# Patient Record
Sex: FEMALE | Race: BLACK OR AFRICAN AMERICAN | NOT HISPANIC OR LATINO | Employment: UNEMPLOYED
[De-identification: names, ages, dates, MRNs, and addresses within clinical notes are randomized per-mention and may not be internally consistent; named-entity substitution may affect disease eponyms.]

---
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
## 2018-09-04 ENCOUNTER — CHARTING TRANS (OUTPATIENT)
Dept: OTHER | Age: 18
End: 2018-09-04

## 2018-11-03 VITALS
WEIGHT: 164.02 LBS | HEART RATE: 65 BPM | TEMPERATURE: 96.8 F | SYSTOLIC BLOOD PRESSURE: 126 MMHG | HEIGHT: 70 IN | RESPIRATION RATE: 18 BRPM | DIASTOLIC BLOOD PRESSURE: 69 MMHG | BODY MASS INDEX: 23.48 KG/M2 | OXYGEN SATURATION: 100 %

## 2018-11-03 VITALS
OXYGEN SATURATION: 98 % | SYSTOLIC BLOOD PRESSURE: 104 MMHG | DIASTOLIC BLOOD PRESSURE: 64 MMHG | TEMPERATURE: 97.9 F | RESPIRATION RATE: 18 BRPM | WEIGHT: 160.93 LBS

## 2018-11-06 VITALS
OXYGEN SATURATION: 97 % | HEIGHT: 70 IN | HEART RATE: 64 BPM | BODY MASS INDEX: 24.03 KG/M2 | DIASTOLIC BLOOD PRESSURE: 80 MMHG | RESPIRATION RATE: 18 BRPM | SYSTOLIC BLOOD PRESSURE: 110 MMHG | WEIGHT: 167.88 LBS

## 2018-11-27 VITALS
SYSTOLIC BLOOD PRESSURE: 130 MMHG | WEIGHT: 167.77 LBS | HEIGHT: 70 IN | HEART RATE: 66 BPM | OXYGEN SATURATION: 100 % | DIASTOLIC BLOOD PRESSURE: 70 MMHG | RESPIRATION RATE: 20 BRPM | BODY MASS INDEX: 24.02 KG/M2

## 2019-01-02 ENCOUNTER — OFFICE VISIT (OUTPATIENT)
Dept: URGENT CARE | Age: 19
End: 2019-01-02

## 2019-01-02 DIAGNOSIS — N89.8 VAGINAL DISCHARGE: Primary | ICD-10-CM

## 2019-01-02 PROCEDURE — 87660 TRICHOMONAS VAGIN DIR PROBE: CPT | Performed by: FAMILY MEDICINE

## 2019-01-02 PROCEDURE — 87480 CANDIDA DNA DIR PROBE: CPT | Performed by: FAMILY MEDICINE

## 2019-01-02 PROCEDURE — 87591 N.GONORRHOEAE DNA AMP PROB: CPT | Performed by: FAMILY MEDICINE

## 2019-01-02 PROCEDURE — 87510 GARDNER VAG DNA DIR PROBE: CPT | Performed by: FAMILY MEDICINE

## 2019-01-02 PROCEDURE — 87491 CHLMYD TRACH DNA AMP PROBE: CPT | Performed by: FAMILY MEDICINE

## 2019-01-02 PROCEDURE — 99214 OFFICE O/P EST MOD 30 MIN: CPT | Performed by: FAMILY MEDICINE

## 2019-01-02 RX ORDER — METRONIDAZOLE 500 MG/1
500 TABLET ORAL 2 TIMES DAILY
Qty: 14 TABLET | Refills: 0 | Status: SHIPPED | OUTPATIENT
Start: 2019-01-02 | End: 2019-01-09

## 2019-01-02 RX ORDER — FLUCONAZOLE 150 MG/1
150 TABLET ORAL ONCE
Qty: 2 TABLET | Refills: 0 | Status: SHIPPED | OUTPATIENT
Start: 2019-01-02 | End: 2019-01-02

## 2019-01-02 SDOH — HEALTH STABILITY: MENTAL HEALTH: HOW OFTEN DO YOU HAVE A DRINK CONTAINING ALCOHOL?: NEVER

## 2019-01-02 ASSESSMENT — PAIN SCALES - GENERAL: PAINLEVEL: 0

## 2019-01-03 LAB
CANDIDA RRNA VAG QL PROBE: NEGATIVE
G VAGINALIS RRNA GENITAL QL PROBE: POSITIVE
T VAGINALIS RRNA GENITAL QL PROBE: NEGATIVE

## 2019-01-04 ENCOUNTER — TELEPHONE (OUTPATIENT)
Dept: URGENT CARE | Age: 19
End: 2019-01-04

## 2019-01-04 LAB
C TRACH RRNA SPEC QL NAA+PROBE: POSITIVE
N GONORRHOEA RRNA SPEC QL NAA+PROBE: NEGATIVE
SPECIMEN SOURCE: ABNORMAL

## 2019-01-04 RX ORDER — AZITHROMYCIN 500 MG/1
1000 TABLET, FILM COATED ORAL DAILY
Qty: 2 TABLET | Refills: 0 | Status: SHIPPED | OUTPATIENT
Start: 2019-01-04 | End: 2019-01-05

## 2019-05-21 ENCOUNTER — HOSPITAL ENCOUNTER (EMERGENCY)
Facility: CLINIC | Age: 19
Discharge: HOME | End: 2019-05-21
Attending: EMERGENCY MEDICINE | Admitting: EMERGENCY MEDICINE

## 2019-05-21 VITALS
BODY MASS INDEX: 22.06 KG/M2 | RESPIRATION RATE: 19 BRPM | SYSTOLIC BLOOD PRESSURE: 124 MMHG | HEART RATE: 56 BPM | WEIGHT: 162.9 LBS | HEIGHT: 72 IN | DIASTOLIC BLOOD PRESSURE: 59 MMHG | TEMPERATURE: 98.7 F | OXYGEN SATURATION: 100 %

## 2019-05-21 DIAGNOSIS — R46.89 BEHAVIOR CHANGE DUE TO SUBSTANCE USE: ICD-10-CM

## 2019-05-21 LAB
AMPHETAMINES UR QL SCN: NEGATIVE
BARBITURATES UR QL: NEGATIVE
BENZODIAZ UR QL: NEGATIVE
CANNABINOIDS UR QL SCN: POSITIVE
COCAINE UR QL: NEGATIVE
ETHANOL UR QL SCN: NEGATIVE
GLUCOSE BLDC GLUCOMTR-MCNC: 119 MG/DL (ref 70–99)
HCG UR QL: NEGATIVE
OPIATES UR QL SCN: NEGATIVE

## 2019-05-21 PROCEDURE — 99283 EMERGENCY DEPT VISIT LOW MDM: CPT | Mod: Z6 | Performed by: EMERGENCY MEDICINE

## 2019-05-21 PROCEDURE — 80307 DRUG TEST PRSMV CHEM ANLYZR: CPT | Performed by: EMERGENCY MEDICINE

## 2019-05-21 PROCEDURE — 81025 URINE PREGNANCY TEST: CPT | Performed by: EMERGENCY MEDICINE

## 2019-05-21 PROCEDURE — 00000146 ZZHCL STATISTIC GLUCOSE BY METER IP

## 2019-05-21 PROCEDURE — 99283 EMERGENCY DEPT VISIT LOW MDM: CPT | Performed by: EMERGENCY MEDICINE

## 2019-05-21 PROCEDURE — 80320 DRUG SCREEN QUANTALCOHOLS: CPT | Performed by: EMERGENCY MEDICINE

## 2019-05-21 ASSESSMENT — MIFFLIN-ST. JEOR: SCORE: 1620.91

## 2019-05-21 NOTE — ED NOTES
Per friends who are here with patient, patient reportedly ingested an edible form of marijuana.  Unsure if she used anything else.  She appears paranoid and psychotic.

## 2019-05-21 NOTE — ED AVS SNAPSHOT
[facility], Emergency Department  [address] [place] [address]  Phone:  [phone]  Fax:  [phone]                                    Bibiana Andino   MRN: [id number]    Department:  [facility], Emergency Department   Date of Visit:  5/21/2019           After Visit Summary Signature Page    I have received my discharge instructions, and my questions have been answered. I have discussed any challenges I see with this plan with the nurse or doctor.    ..........................................................................................................................................  Patient/Patient Representative Signature      ..........................................................................................................................................  Patient Representative Print Name and Relationship to Patient    ..................................................               ................................................  Date                                   Time    ..........................................................................................................................................  Reviewed by Signature/Title    ...................................................              ..............................................  Date                                               Time          22EPIC Rev 08/18

## 2019-05-21 NOTE — DISCHARGE INSTRUCTIONS
Do not drive today.  Avoid drug use.   Follow up with your primary care provider this week.  Return if persistent symptoms.
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
## 2019-05-30 ASSESSMENT — ENCOUNTER SYMPTOMS
DIZZINESS: 1
FEVER: 0
COLOR CHANGE: 0
CHILLS: 0
BACK PAIN: 0
NECK PAIN: 0
MYALGIAS: 0
VOMITING: 0
HALLUCINATIONS: 0
SORE THROAT: 0
FATIGUE: 1
DIFFICULTY URINATING: 0
NERVOUS/ANXIOUS: 1
DYSPHORIC MOOD: 1
TREMORS: 0
AGITATION: 0
CONFUSION: 0
HEMATURIA: 0
SHORTNESS OF BREATH: 0
BRUISES/BLEEDS EASILY: 0
EYE REDNESS: 0
SEIZURES: 0
PALPITATIONS: 0
NAUSEA: 1
ARTHRALGIAS: 0
DECREASED CONCENTRATION: 1
HEADACHES: 0
FLANK PAIN: 0
ABDOMINAL PAIN: 0
NECK STIFFNESS: 0
COUGH: 0
CHEST TIGHTNESS: 0
RHINORRHEA: 0
WEAKNESS: 0
DIARRHEA: 0
DIAPHORESIS: 0

## 2019-06-10 ENCOUNTER — TELEPHONE (OUTPATIENT)
Dept: SCHEDULING | Age: 19
End: 2019-06-10

## 2019-06-13 ENCOUNTER — TELEPHONE (OUTPATIENT)
Dept: SCHEDULING | Age: 19
End: 2019-06-13

## 2019-07-29 ENCOUNTER — TELEPHONE (OUTPATIENT)
Dept: SCHEDULING | Age: 19
End: 2019-07-29

## 2019-07-31 ENCOUNTER — OFFICE VISIT (OUTPATIENT)
Dept: FAMILY MEDICINE | Age: 19
End: 2019-07-31

## 2019-07-31 VITALS
HEART RATE: 56 BPM | DIASTOLIC BLOOD PRESSURE: 52 MMHG | HEIGHT: 71 IN | TEMPERATURE: 98.2 F | SYSTOLIC BLOOD PRESSURE: 108 MMHG | BODY MASS INDEX: 22.69 KG/M2 | WEIGHT: 162.04 LBS | OXYGEN SATURATION: 100 %

## 2019-07-31 DIAGNOSIS — Z20.2 CHLAMYDIA CONTACT, TREATED: ICD-10-CM

## 2019-07-31 DIAGNOSIS — Z01.419 ENCOUNTER FOR ANNUAL ROUTINE GYNECOLOGICAL EXAMINATION: Primary | ICD-10-CM

## 2019-07-31 PROCEDURE — 87491 CHLMYD TRACH DNA AMP PROBE: CPT | Performed by: FAMILY MEDICINE

## 2019-07-31 PROCEDURE — 87591 N.GONORRHOEAE DNA AMP PROB: CPT | Performed by: FAMILY MEDICINE

## 2019-07-31 PROCEDURE — 99395 PREV VISIT EST AGE 18-39: CPT | Performed by: FAMILY MEDICINE

## 2019-07-31 SDOH — HEALTH STABILITY: MENTAL HEALTH: HOW OFTEN DO YOU HAVE A DRINK CONTAINING ALCOHOL?: NEVER

## 2019-07-31 ASSESSMENT — PATIENT HEALTH QUESTIONNAIRE - PHQ9
SUM OF ALL RESPONSES TO PHQ9 QUESTIONS 1 AND 2: 0
1. LITTLE INTEREST OR PLEASURE IN DOING THINGS: NOT AT ALL
SUM OF ALL RESPONSES TO PHQ9 QUESTIONS 1 AND 2: 0
2. FEELING DOWN, DEPRESSED OR HOPELESS: NOT AT ALL

## 2019-08-01 LAB
C TRACH RRNA SPEC QL NAA+PROBE: NEGATIVE
N GONORRHOEA RRNA SPEC QL NAA+PROBE: NEGATIVE
SPECIMEN SOURCE: NORMAL

## 2019-08-05 ASSESSMENT — ENCOUNTER SYMPTOMS
SINUS PAIN: 0
SINUS PRESSURE: 0
HEADACHES: 0
DIARRHEA: 0
BACK PAIN: 0
SORE THROAT: 0
CHILLS: 0
DIZZINESS: 0
SHORTNESS OF BREATH: 0
WHEEZING: 0
FEVER: 0
ABDOMINAL PAIN: 0
NUMBNESS: 0
VOMITING: 0
RHINORRHEA: 0
FATIGUE: 0
NAUSEA: 0
CONSTIPATION: 0
COUGH: 0
LIGHT-HEADEDNESS: 0

## 2019-08-11 LAB — CYTOLOGY CVX/VAG DOC THIN PREP: NORMAL

## 2021-01-01 ENCOUNTER — EXTERNAL RECORD (OUTPATIENT)
Dept: HEALTH INFORMATION MANAGEMENT | Facility: OTHER | Age: 21
End: 2021-01-01

## 2021-04-29 ENCOUNTER — TELEPHONE (OUTPATIENT)
Dept: SCHEDULING | Age: 21
End: 2021-04-29

## 2021-04-30 ENCOUNTER — TELEPHONE (OUTPATIENT)
Dept: SCHEDULING | Age: 21
End: 2021-04-30

## 2021-05-06 ENCOUNTER — TELEPHONE (OUTPATIENT)
Dept: SCHEDULING | Age: 21
End: 2021-05-06

## 2021-05-08 ENCOUNTER — OFFICE VISIT (OUTPATIENT)
Dept: FAMILY MEDICINE | Age: 21
End: 2021-05-08

## 2021-05-08 VITALS
HEIGHT: 71 IN | OXYGEN SATURATION: 97 % | BODY MASS INDEX: 24.23 KG/M2 | SYSTOLIC BLOOD PRESSURE: 111 MMHG | DIASTOLIC BLOOD PRESSURE: 60 MMHG | HEART RATE: 70 BPM | TEMPERATURE: 98.8 F | WEIGHT: 173.06 LBS

## 2021-05-08 DIAGNOSIS — Z00.00 ROUTINE HISTORY AND PHYSICAL EXAMINATION OF ADULT: ICD-10-CM

## 2021-05-08 DIAGNOSIS — F30.9 MANIA (CMD): Primary | ICD-10-CM

## 2021-05-08 DIAGNOSIS — L70.0 ACNE VULGARIS: ICD-10-CM

## 2021-05-08 DIAGNOSIS — Z13.1 SCREENING FOR DIABETES MELLITUS (DM): ICD-10-CM

## 2021-05-08 DIAGNOSIS — Z01.419 VISIT FOR GYNECOLOGIC EXAMINATION: ICD-10-CM

## 2021-05-08 DIAGNOSIS — Z11.3 SCREEN FOR STD (SEXUALLY TRANSMITTED DISEASE): ICD-10-CM

## 2021-05-08 DIAGNOSIS — Z11.59 NEED FOR HEPATITIS C SCREENING TEST: ICD-10-CM

## 2021-05-08 PROBLEM — Z20.2 CHLAMYDIA CONTACT, TREATED: Status: RESOLVED | Noted: 2019-07-31 | Resolved: 2021-05-08

## 2021-05-08 PROCEDURE — 99203 OFFICE O/P NEW LOW 30 MIN: CPT | Performed by: GENERAL PRACTICE

## 2021-05-08 RX ORDER — LITHIUM CARBONATE 300 MG/1
600 TABLET, FILM COATED, EXTENDED RELEASE ORAL 2 TIMES DAILY
COMMUNITY
Start: 2021-05-03 | End: 2021-05-08 | Stop reason: SDUPTHER

## 2021-05-08 RX ORDER — ADAPALENE 0.1 G/100G
CREAM TOPICAL NIGHTLY
Qty: 45 G | Refills: 0 | Status: SHIPPED | OUTPATIENT
Start: 2021-05-08

## 2021-05-08 RX ORDER — LITHIUM CARBONATE 300 MG/1
TABLET, FILM COATED, EXTENDED RELEASE ORAL
Qty: 120 TABLET | Refills: 1 | Status: SHIPPED | OUTPATIENT
Start: 2021-05-08 | End: 2021-06-14 | Stop reason: SDUPTHER

## 2021-05-08 ASSESSMENT — ENCOUNTER SYMPTOMS
POLYDIPSIA: 0
DIZZINESS: 0
EYE DISCHARGE: 0
DIAPHORESIS: 0
EYE PAIN: 0
UNEXPECTED WEIGHT CHANGE: 0
WHEEZING: 0
COLOR CHANGE: 0
NERVOUS/ANXIOUS: 0
FEVER: 0
VOMITING: 0
AGITATION: 0
CONFUSION: 0
HEADACHES: 0
BACK PAIN: 0
SORE THROAT: 0
ROS SKIN COMMENTS: + ACNE
SINUS PAIN: 0
BRUISES/BLEEDS EASILY: 0
POLYPHAGIA: 0
WEAKNESS: 0
TREMORS: 0
ACTIVITY CHANGE: 0
APPETITE CHANGE: 0
VOICE CHANGE: 0
FATIGUE: 0
LIGHT-HEADEDNESS: 0
ABDOMINAL PAIN: 0
RHINORRHEA: 0
CHILLS: 0
NAUSEA: 0
COUGH: 0
SHORTNESS OF BREATH: 0
CONSTIPATION: 0
DIARRHEA: 0
BLOOD IN STOOL: 0

## 2021-05-08 ASSESSMENT — PATIENT HEALTH QUESTIONNAIRE - PHQ9
CLINICAL INTERPRETATION OF PHQ9 SCORE: NO FURTHER SCREENING NEEDED
SUM OF ALL RESPONSES TO PHQ9 QUESTIONS 1 AND 2: 0
1. LITTLE INTEREST OR PLEASURE IN DOING THINGS: NOT AT ALL
CLINICAL INTERPRETATION OF PHQ2 SCORE: NO FURTHER SCREENING NEEDED
SUM OF ALL RESPONSES TO PHQ9 QUESTIONS 1 AND 2: 0
2. FEELING DOWN, DEPRESSED OR HOPELESS: NOT AT ALL

## 2021-05-10 ENCOUNTER — LAB SERVICES (OUTPATIENT)
Dept: LAB | Age: 21
End: 2021-05-10

## 2021-05-10 DIAGNOSIS — Z13.1 SCREENING FOR DIABETES MELLITUS (DM): ICD-10-CM

## 2021-05-10 DIAGNOSIS — Z00.00 ROUTINE HISTORY AND PHYSICAL EXAMINATION OF ADULT: ICD-10-CM

## 2021-05-10 DIAGNOSIS — Z11.3 SCREEN FOR STD (SEXUALLY TRANSMITTED DISEASE): ICD-10-CM

## 2021-05-10 DIAGNOSIS — Z11.59 NEED FOR HEPATITIS C SCREENING TEST: ICD-10-CM

## 2021-05-10 LAB
ALBUMIN SERPL-MCNC: 4.4 G/DL (ref 3.6–5.1)
ALBUMIN/GLOB SERPL: 1.1 {RATIO} (ref 1–2.4)
ALP SERPL-CCNC: 79 UNITS/L (ref 45–117)
ALT SERPL-CCNC: 13 UNITS/L
ANION GAP SERPL CALC-SCNC: 10 MMOL/L (ref 10–20)
ANNOTATION COMMENT IMP: NORMAL
APPEARANCE UR: NORMAL
AST SERPL-CCNC: 11 UNITS/L
BASOPHILS # BLD: 0 K/MCL (ref 0–0.3)
BASOPHILS NFR BLD: 0 %
BILIRUB SERPL-MCNC: 0.6 MG/DL (ref 0.2–1)
BILIRUB UR QL STRIP: NEGATIVE
BUN SERPL-MCNC: 9 MG/DL (ref 6–20)
BUN/CREAT SERPL: 10 (ref 7–25)
CALCIUM SERPL-MCNC: 10.3 MG/DL (ref 8.4–10.2)
CHLORIDE SERPL-SCNC: 105 MMOL/L (ref 98–107)
CHOLEST SERPL-MCNC: 181 MG/DL
CHOLEST/HDLC SERPL: 2.7 {RATIO}
CO2 SERPL-SCNC: 26 MMOL/L (ref 21–32)
COLOR UR: YELLOW
CREAT SERPL-MCNC: 0.88 MG/DL (ref 0.51–0.95)
DEPRECATED RDW RBC: 47.1 FL (ref 39–50)
EOSINOPHIL # BLD: 0.2 K/MCL (ref 0–0.5)
EOSINOPHIL NFR BLD: 3 %
ERYTHROCYTE [DISTWIDTH] IN BLOOD: 14.3 % (ref 11–15)
FASTING DURATION TIME PATIENT: ABNORMAL H
FASTING DURATION TIME PATIENT: NORMAL H
GFR SERPLBLD BASED ON 1.73 SQ M-ARVRAT: >90 ML/MIN/1.73M2
GLOBULIN SER-MCNC: 4.1 G/DL (ref 2–4)
GLUCOSE SERPL-MCNC: 87 MG/DL (ref 65–99)
GLUCOSE UR STRIP-MCNC: NEGATIVE MG/DL
HAV IGM SER QL: NEGATIVE
HBA1C MFR BLD: 5.2 % (ref 4.5–5.6)
HBV CORE IGM SER QL: NEGATIVE
HBV SURFACE AG SER QL: NEGATIVE
HCT VFR BLD CALC: 42.6 % (ref 36–46.5)
HCV AB SER QL: NEGATIVE
HDLC SERPL-MCNC: 67 MG/DL
HGB BLD-MCNC: 13.2 G/DL (ref 12–15.5)
HGB UR QL STRIP: NEGATIVE
HIV 1+2 AB+HIV1 P24 AG SERPL QL IA: NONREACTIVE
IMM GRANULOCYTES # BLD AUTO: 0 K/MCL (ref 0–0.2)
IMM GRANULOCYTES # BLD: 0 %
IMP & REVIEW OF LAB RESULTS: NORMAL
KETONES UR STRIP-MCNC: NEGATIVE MG/DL
LDLC SERPL CALC-MCNC: 100 MG/DL
LEUKOCYTE ESTERASE UR QL STRIP: NEGATIVE
LYMPHOCYTES # BLD: 1.3 K/MCL (ref 1–4.8)
LYMPHOCYTES NFR BLD: 18 %
MCH RBC QN AUTO: 27.7 PG (ref 26–34)
MCHC RBC AUTO-ENTMCNC: 31 G/DL (ref 32–36.5)
MCV RBC AUTO: 89.5 FL (ref 78–100)
MONOCYTES # BLD: 0.7 K/MCL (ref 0.3–0.9)
MONOCYTES NFR BLD: 10 %
NEUTROPHILS # BLD: 4.8 K/MCL (ref 1.8–7.7)
NEUTROPHILS NFR BLD: 69 %
NITRITE UR QL STRIP: NEGATIVE
NONHDLC SERPL-MCNC: 114 MG/DL
NRBC BLD MANUAL-RTO: 0 /100 WBC
PH UR STRIP: 7 [PH] (ref 5–7)
PLATELET # BLD AUTO: 227 K/MCL (ref 140–450)
POTASSIUM SERPL-SCNC: 4.4 MMOL/L (ref 3.4–5.1)
PROT SERPL-MCNC: 8.5 G/DL (ref 6.4–8.2)
PROT UR STRIP-MCNC: NEGATIVE MG/DL
RBC # BLD: 4.76 MIL/MCL (ref 4–5.2)
SODIUM SERPL-SCNC: 137 MMOL/L (ref 135–145)
SP GR UR STRIP: 1.01 (ref 1–1.03)
TRIGL SERPL-MCNC: 68 MG/DL
TSH SERPL-ACNC: 1.59 MCUNITS/ML (ref 0.35–5)
UROBILINOGEN UR STRIP-MCNC: 0.2 MG/DL
WBC # BLD: 7.1 K/MCL (ref 4.2–11)

## 2021-05-10 PROCEDURE — 82306 VITAMIN D 25 HYDROXY: CPT | Performed by: GENERAL PRACTICE

## 2021-05-10 PROCEDURE — 84443 ASSAY THYROID STIM HORMONE: CPT | Performed by: GENERAL PRACTICE

## 2021-05-10 PROCEDURE — 80061 LIPID PANEL: CPT | Performed by: GENERAL PRACTICE

## 2021-05-10 PROCEDURE — 87389 HIV-1 AG W/HIV-1&-2 AB AG IA: CPT | Performed by: GENERAL PRACTICE

## 2021-05-10 PROCEDURE — 86592 SYPHILIS TEST NON-TREP QUAL: CPT | Performed by: GENERAL PRACTICE

## 2021-05-10 PROCEDURE — 36415 COLL VENOUS BLD VENIPUNCTURE: CPT | Performed by: GENERAL PRACTICE

## 2021-05-10 PROCEDURE — 80074 ACUTE HEPATITIS PANEL: CPT | Performed by: GENERAL PRACTICE

## 2021-05-10 PROCEDURE — 83036 HEMOGLOBIN GLYCOSYLATED A1C: CPT | Performed by: GENERAL PRACTICE

## 2021-05-10 PROCEDURE — 80053 COMPREHEN METABOLIC PANEL: CPT | Performed by: GENERAL PRACTICE

## 2021-05-10 PROCEDURE — 85025 COMPLETE CBC W/AUTO DIFF WBC: CPT | Performed by: GENERAL PRACTICE

## 2021-05-10 PROCEDURE — 81003 URINALYSIS AUTO W/O SCOPE: CPT | Performed by: GENERAL PRACTICE

## 2021-05-11 LAB
25(OH)D3+25(OH)D2 SERPL-MCNC: 29.6 NG/ML (ref 30–100)
RPR SER QL: NONREACTIVE

## 2021-05-25 VITALS
RESPIRATION RATE: 18 BRPM | SYSTOLIC BLOOD PRESSURE: 100 MMHG | TEMPERATURE: 98 F | HEART RATE: 83 BPM | DIASTOLIC BLOOD PRESSURE: 62 MMHG | OXYGEN SATURATION: 100 % | WEIGHT: 176.04 LBS

## 2021-06-14 DIAGNOSIS — F30.9 MANIA (CMD): ICD-10-CM

## 2021-06-14 RX ORDER — LITHIUM CARBONATE 300 MG/1
TABLET, FILM COATED, EXTENDED RELEASE ORAL
Qty: 120 TABLET | Refills: 1 | Status: SHIPPED | OUTPATIENT
Start: 2021-06-14

## 2021-06-28 ENCOUNTER — TELEPHONE (OUTPATIENT)
Dept: SCHEDULING | Age: 21
End: 2021-06-28

## 2021-06-29 ENCOUNTER — TELEPHONE (OUTPATIENT)
Dept: SCHEDULING | Age: 21
End: 2021-06-29

## 2021-07-07 ENCOUNTER — HOSPITAL ENCOUNTER (INPATIENT)
Age: 21
End: 2021-07-07
Attending: PSYCHIATRY & NEUROLOGY | Admitting: PSYCHIATRY & NEUROLOGY

## 2021-07-07 ENCOUNTER — TELEPHONE (OUTPATIENT)
Dept: SCHEDULING | Age: 21
End: 2021-07-07

## 2021-07-12 ENCOUNTER — TELEPHONE (OUTPATIENT)
Dept: SCHEDULING | Age: 21
End: 2021-07-12

## 2021-07-19 ENCOUNTER — LAB SERVICES (OUTPATIENT)
Dept: LAB | Age: 21
End: 2021-07-19

## 2021-07-19 ENCOUNTER — OFFICE VISIT (OUTPATIENT)
Dept: FAMILY MEDICINE | Age: 21
End: 2021-07-19

## 2021-07-19 VITALS
BODY MASS INDEX: 23.46 KG/M2 | TEMPERATURE: 98.2 F | OXYGEN SATURATION: 100 % | DIASTOLIC BLOOD PRESSURE: 58 MMHG | SYSTOLIC BLOOD PRESSURE: 111 MMHG | HEIGHT: 71 IN | HEART RATE: 63 BPM | RESPIRATION RATE: 18 BRPM | WEIGHT: 167.55 LBS

## 2021-07-19 DIAGNOSIS — F30.9 MANIA (CMD): ICD-10-CM

## 2021-07-19 DIAGNOSIS — F30.9 MANIA (CMD): Primary | ICD-10-CM

## 2021-07-19 DIAGNOSIS — E83.52 HYPERCALCEMIA: ICD-10-CM

## 2021-07-19 DIAGNOSIS — E55.9 VITAMIN D INSUFFICIENCY: ICD-10-CM

## 2021-07-19 DIAGNOSIS — L25.9 CONTACT DERMATITIS, UNSPECIFIED CONTACT DERMATITIS TYPE, UNSPECIFIED TRIGGER: ICD-10-CM

## 2021-07-19 DIAGNOSIS — F31.11 BIPOLAR AFFECTIVE DISORDER, CURRENTLY MANIC, MILD (CMD): ICD-10-CM

## 2021-07-19 DIAGNOSIS — S90.811A ABRASION OF RIGHT FOOT, INITIAL ENCOUNTER: ICD-10-CM

## 2021-07-19 PROCEDURE — 90471 IMMUNIZATION ADMIN: CPT | Performed by: GENERAL PRACTICE

## 2021-07-19 PROCEDURE — 80053 COMPREHEN METABOLIC PANEL: CPT | Performed by: GENERAL PRACTICE

## 2021-07-19 PROCEDURE — 99215 OFFICE O/P EST HI 40 MIN: CPT | Performed by: GENERAL PRACTICE

## 2021-07-19 PROCEDURE — 90714 TD VACC NO PRESV 7 YRS+ IM: CPT

## 2021-07-19 PROCEDURE — 80178 ASSAY OF LITHIUM: CPT | Performed by: GENERAL PRACTICE

## 2021-07-19 PROCEDURE — 36415 COLL VENOUS BLD VENIPUNCTURE: CPT

## 2021-07-19 RX ORDER — LITHIUM CARBONATE 300 MG/1
300 CAPSULE ORAL DAILY
COMMUNITY

## 2021-07-19 RX ORDER — HYDROXYZINE HYDROCHLORIDE 25 MG/1
25 TABLET, FILM COATED ORAL 3 TIMES DAILY PRN
Qty: 21 TABLET | Refills: 0 | Status: SHIPPED | OUTPATIENT
Start: 2021-07-19 | End: 2021-07-27 | Stop reason: SDUPTHER

## 2021-07-19 RX ORDER — LITHIUM CARBONATE 600 MG/1
600 CAPSULE ORAL DAILY
COMMUNITY

## 2021-07-19 RX ORDER — PREDNISONE 10 MG/1
TABLET ORAL
Qty: 12 TABLET | Refills: 0 | Status: SHIPPED | OUTPATIENT
Start: 2021-07-19

## 2021-07-19 RX ORDER — AMOXICILLIN AND CLAVULANATE POTASSIUM 875; 125 MG/1; MG/1
1 TABLET, FILM COATED ORAL EVERY 12 HOURS
Qty: 20 TABLET | Refills: 0 | Status: SHIPPED | OUTPATIENT
Start: 2021-07-19 | End: 2021-07-29

## 2021-07-19 RX ORDER — GINSENG 100 MG
CAPSULE ORAL 2 TIMES DAILY
Qty: 28 G | Refills: 0 | Status: SHIPPED | OUTPATIENT
Start: 2021-07-19 | End: 2021-07-27 | Stop reason: ALTCHOICE

## 2021-07-19 ASSESSMENT — ENCOUNTER SYMPTOMS
SHORTNESS OF BREATH: 0
WHEEZING: 0
ACTIVITY CHANGE: 0
LIGHT-HEADEDNESS: 0
EYE DISCHARGE: 0
CONFUSION: 0
BLOOD IN STOOL: 0
CHILLS: 0
COUGH: 0
NERVOUS/ANXIOUS: 0
POLYPHAGIA: 0
DIARRHEA: 0
RHINORRHEA: 0
COLOR CHANGE: 0
BRUISES/BLEEDS EASILY: 0
APPETITE CHANGE: 0
CONSTIPATION: 0
POLYDIPSIA: 0
WEAKNESS: 0
HEADACHES: 0
SINUS PAIN: 0
EYE PAIN: 0
AGITATION: 0
DIZZINESS: 0
VOMITING: 0
FEVER: 0
DIAPHORESIS: 0
UNEXPECTED WEIGHT CHANGE: 0
FATIGUE: 0
TREMORS: 0
BACK PAIN: 0
SORE THROAT: 0
ABDOMINAL PAIN: 0
NAUSEA: 0
VOICE CHANGE: 0

## 2021-07-20 LAB
ALBUMIN SERPL-MCNC: 4.3 G/DL (ref 3.6–5.1)
ALBUMIN/GLOB SERPL: 1.2 {RATIO} (ref 1–2.4)
ALP SERPL-CCNC: 75 UNITS/L (ref 45–117)
ALT SERPL-CCNC: 15 UNITS/L
ANION GAP SERPL CALC-SCNC: 9 MMOL/L (ref 10–20)
AST SERPL-CCNC: 14 UNITS/L
BILIRUB SERPL-MCNC: 0.4 MG/DL (ref 0.2–1)
BUN SERPL-MCNC: 10 MG/DL (ref 6–20)
BUN/CREAT SERPL: 13 (ref 7–25)
CALCIUM SERPL-MCNC: 10.1 MG/DL (ref 8.4–10.2)
CHLORIDE SERPL-SCNC: 107 MMOL/L (ref 98–107)
CO2 SERPL-SCNC: 29 MMOL/L (ref 21–32)
CREAT SERPL-MCNC: 0.78 MG/DL (ref 0.51–0.95)
FASTING DURATION TIME PATIENT: ABNORMAL H
GFR SERPLBLD BASED ON 1.73 SQ M-ARVRAT: >90 ML/MIN/1.73M2
GLOBULIN SER-MCNC: 3.6 G/DL (ref 2–4)
GLUCOSE SERPL-MCNC: 84 MG/DL (ref 65–99)
LITHIUM SERPL-SCNC: 0.8 MMOL/L (ref 0.6–1.2)
POTASSIUM SERPL-SCNC: 4.6 MMOL/L (ref 3.4–5.1)
PROT SERPL-MCNC: 7.9 G/DL (ref 6.4–8.2)
SODIUM SERPL-SCNC: 140 MMOL/L (ref 135–145)

## 2021-07-21 ENCOUNTER — TELEPHONE (OUTPATIENT)
Dept: SCHEDULING | Age: 21
End: 2021-07-21

## 2021-07-21 ENCOUNTER — TELEPHONE (OUTPATIENT)
Dept: FAMILY MEDICINE | Age: 21
End: 2021-07-21

## 2021-07-22 ENCOUNTER — TELEPHONE (OUTPATIENT)
Dept: SCHEDULING | Age: 21
End: 2021-07-22

## 2021-07-23 ENCOUNTER — TELEPHONE (OUTPATIENT)
Dept: SCHEDULING | Age: 21
End: 2021-07-23

## 2021-07-23 ENCOUNTER — APPOINTMENT (OUTPATIENT)
Dept: FAMILY MEDICINE | Age: 21
End: 2021-07-23

## 2021-07-26 ENCOUNTER — TELEPHONE (OUTPATIENT)
Dept: SCHEDULING | Age: 21
End: 2021-07-26

## 2021-07-27 ENCOUNTER — APPOINTMENT (OUTPATIENT)
Dept: FAMILY MEDICINE | Age: 21
End: 2021-07-27

## 2021-07-27 ENCOUNTER — OFFICE VISIT (OUTPATIENT)
Dept: FAMILY MEDICINE | Age: 21
End: 2021-07-27

## 2021-07-27 VITALS
HEIGHT: 71 IN | TEMPERATURE: 98.4 F | OXYGEN SATURATION: 100 % | HEART RATE: 72 BPM | BODY MASS INDEX: 23.46 KG/M2 | RESPIRATION RATE: 18 BRPM | DIASTOLIC BLOOD PRESSURE: 74 MMHG | WEIGHT: 167.55 LBS | SYSTOLIC BLOOD PRESSURE: 103 MMHG

## 2021-07-27 DIAGNOSIS — F30.9 MANIA (CMD): ICD-10-CM

## 2021-07-27 DIAGNOSIS — E83.52 HYPERCALCEMIA: ICD-10-CM

## 2021-07-27 DIAGNOSIS — F31.11 BIPOLAR AFFECTIVE DISORDER, CURRENTLY MANIC, MILD (CMD): Primary | ICD-10-CM

## 2021-07-27 DIAGNOSIS — S90.811A ABRASION OF RIGHT FOOT, INITIAL ENCOUNTER: ICD-10-CM

## 2021-07-27 DIAGNOSIS — L25.9 CONTACT DERMATITIS, UNSPECIFIED CONTACT DERMATITIS TYPE, UNSPECIFIED TRIGGER: ICD-10-CM

## 2021-07-27 DIAGNOSIS — L24.9 IRRITANT CONTACT DERMATITIS, UNSPECIFIED TRIGGER: ICD-10-CM

## 2021-07-27 PROCEDURE — 99213 OFFICE O/P EST LOW 20 MIN: CPT | Performed by: GENERAL PRACTICE

## 2021-07-27 RX ORDER — PREDNISONE 20 MG/1
20 TABLET ORAL DAILY
Qty: 5 TABLET | Refills: 0 | Status: SHIPPED | OUTPATIENT
Start: 2021-07-27 | End: 2021-08-01

## 2021-07-27 RX ORDER — HYDROXYZINE HYDROCHLORIDE 25 MG/1
25 TABLET, FILM COATED ORAL 3 TIMES DAILY PRN
Qty: 21 TABLET | Refills: 0 | Status: SHIPPED | OUTPATIENT
Start: 2021-07-27 | End: 2021-08-03

## 2021-07-27 ASSESSMENT — ENCOUNTER SYMPTOMS
CHILLS: 0
BACK PAIN: 0
VOICE CHANGE: 0
SINUS PAIN: 0
RHINORRHEA: 0
COUGH: 0
EYE PAIN: 0
NAUSEA: 0
BRUISES/BLEEDS EASILY: 0
WHEEZING: 0
ACTIVITY CHANGE: 0
ABDOMINAL PAIN: 0
APPETITE CHANGE: 0
WEAKNESS: 0
HEADACHES: 0
LIGHT-HEADEDNESS: 0
POLYPHAGIA: 0
CONSTIPATION: 0
AGITATION: 0
FATIGUE: 0
FEVER: 0
UNEXPECTED WEIGHT CHANGE: 0
POLYDIPSIA: 0
EYE DISCHARGE: 0
NERVOUS/ANXIOUS: 0
COLOR CHANGE: 0
VOMITING: 0
SHORTNESS OF BREATH: 0
DIAPHORESIS: 0
SORE THROAT: 0
BLOOD IN STOOL: 0
TREMORS: 0
CONFUSION: 0
DIARRHEA: 0
DIZZINESS: 0

## 2021-09-02 ENCOUNTER — TELEPHONE (OUTPATIENT)
Dept: INTERNAL MEDICINE | Age: 21
End: 2021-09-02

## 2021-11-08 ENCOUNTER — HOSPITAL ENCOUNTER (EMERGENCY)
Facility: CLINIC | Age: 21
Discharge: PSYCHIATRIC OR SUBSTANCE USE FACILITY | End: 2021-11-09
Attending: EMERGENCY MEDICINE | Admitting: EMERGENCY MEDICINE
Payer: MEDICAID

## 2021-11-08 DIAGNOSIS — F31.2 SEVERE MANIC BIPOLAR 1 DISORDER WITH PSYCHOTIC BEHAVIOR (H): Primary | ICD-10-CM

## 2021-11-08 DIAGNOSIS — F43.10 PTSD (POST-TRAUMATIC STRESS DISORDER): ICD-10-CM

## 2021-11-08 LAB
AMPHETAMINES UR QL SCN: ABNORMAL
BARBITURATES UR QL: ABNORMAL
BENZODIAZ UR QL: ABNORMAL
CANNABINOIDS UR QL SCN: ABNORMAL
COCAINE UR QL: ABNORMAL
HCG UR QL: NEGATIVE
OPIATES UR QL SCN: ABNORMAL
PCP UR QL SCN: ABNORMAL
SARS-COV-2 RNA RESP QL NAA+PROBE: NEGATIVE

## 2021-11-08 PROCEDURE — 81025 URINE PREGNANCY TEST: CPT | Performed by: EMERGENCY MEDICINE

## 2021-11-08 PROCEDURE — 90791 PSYCH DIAGNOSTIC EVALUATION: CPT

## 2021-11-08 PROCEDURE — 99285 EMERGENCY DEPT VISIT HI MDM: CPT | Mod: 25

## 2021-11-08 PROCEDURE — 80307 DRUG TEST PRSMV CHEM ANLYZR: CPT | Performed by: EMERGENCY MEDICINE

## 2021-11-08 PROCEDURE — 87635 SARS-COV-2 COVID-19 AMP PRB: CPT | Performed by: EMERGENCY MEDICINE

## 2021-11-08 PROCEDURE — C9803 HOPD COVID-19 SPEC COLLECT: HCPCS

## 2021-11-08 PROCEDURE — 96372 THER/PROPH/DIAG INJ SC/IM: CPT | Performed by: EMERGENCY MEDICINE

## 2021-11-08 PROCEDURE — 250N000011 HC RX IP 250 OP 636: Performed by: EMERGENCY MEDICINE

## 2021-11-08 RX ORDER — HYDROXYZINE HYDROCHLORIDE 25 MG/1
25 TABLET, FILM COATED ORAL
Status: DISCONTINUED | OUTPATIENT
Start: 2021-11-08 | End: 2021-11-09 | Stop reason: HOSPADM

## 2021-11-08 RX ORDER — WATER 10 ML/10ML
INJECTION INTRAMUSCULAR; INTRAVENOUS; SUBCUTANEOUS
Status: DISCONTINUED
Start: 2021-11-08 | End: 2021-11-09 | Stop reason: HOSPADM

## 2021-11-08 RX ORDER — IBUPROFEN 600 MG/1
600 TABLET, FILM COATED ORAL EVERY 4 HOURS PRN
Status: DISCONTINUED | OUTPATIENT
Start: 2021-11-08 | End: 2021-11-09 | Stop reason: HOSPADM

## 2021-11-08 RX ORDER — OLANZAPINE 10 MG/2ML
10 INJECTION, POWDER, FOR SOLUTION INTRAMUSCULAR DAILY PRN
Status: DISCONTINUED | OUTPATIENT
Start: 2021-11-08 | End: 2021-11-09 | Stop reason: HOSPADM

## 2021-11-08 RX ORDER — LANOLIN ALCOHOL/MO/W.PET/CERES
3 CREAM (GRAM) TOPICAL
Status: DISCONTINUED | OUTPATIENT
Start: 2021-11-08 | End: 2021-11-09 | Stop reason: HOSPADM

## 2021-11-08 RX ORDER — LORAZEPAM 1 MG/1
1 TABLET ORAL EVERY 8 HOURS PRN
Status: DISCONTINUED | OUTPATIENT
Start: 2021-11-08 | End: 2021-11-09 | Stop reason: HOSPADM

## 2021-11-08 RX ORDER — ACETAMINOPHEN 325 MG/1
650 TABLET ORAL EVERY 4 HOURS PRN
Status: DISCONTINUED | OUTPATIENT
Start: 2021-11-08 | End: 2021-11-09 | Stop reason: HOSPADM

## 2021-11-08 RX ORDER — OLANZAPINE 10 MG/2ML
10 INJECTION, POWDER, FOR SOLUTION INTRAMUSCULAR 2 TIMES DAILY PRN
Status: DISCONTINUED | OUTPATIENT
Start: 2021-11-08 | End: 2021-11-09 | Stop reason: HOSPADM

## 2021-11-08 RX ADMIN — OLANZAPINE 10 MG: 10 INJECTION, POWDER, FOR SOLUTION INTRAMUSCULAR at 15:16

## 2021-11-08 ASSESSMENT — ENCOUNTER SYMPTOMS
HALLUCINATIONS: 1
FEVER: 0
COUGH: 0
VOMITING: 0
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
## 2021-11-08 NOTE — ED NOTES
Toni Delatorre (friend): [phone]    Brought patient to ED. Reports her and Dom (# in demographics) are primary caregivers and would be caring for her after discharge.
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
## 2021-11-08 NOTE — ED NOTES
Writer approached the patient to administer Zyprexa. Patient continuing to ask writer to put her fingers in her vagina so she can feel the pain she is feeling. Writer declined.  Patient refused Zyprexa.  Plan is to re-aproach with a new nurse at a later time.

## 2021-11-09 ENCOUNTER — TELEPHONE (OUTPATIENT)
Dept: BEHAVIORAL HEALTH | Facility: CLINIC | Age: 21
End: 2021-11-09
Payer: MEDICAID

## 2021-11-09 ENCOUNTER — HOSPITAL ENCOUNTER (INPATIENT)
Facility: CLINIC | Age: 21
LOS: 24 days | Discharge: PSYCHIATRIC OR SUBSTANCE USE FACILITY | End: 2021-12-03
Attending: PSYCHIATRY & NEUROLOGY | Admitting: PSYCHIATRY & NEUROLOGY
Payer: MEDICAID

## 2021-11-09 VITALS
HEART RATE: 77 BPM | BODY MASS INDEX: 22.72 KG/M2 | DIASTOLIC BLOOD PRESSURE: 48 MMHG | OXYGEN SATURATION: 100 % | WEIGHT: 167.5 LBS | SYSTOLIC BLOOD PRESSURE: 107 MMHG | TEMPERATURE: 97.3 F | RESPIRATION RATE: 14 BRPM

## 2021-11-09 DIAGNOSIS — F31.2 BIPOLAR I DISORDER, CURRENT OR MOST RECENT EPISODE MANIC, WITH PSYCHOTIC FEATURES (H): Primary | ICD-10-CM

## 2021-11-09 PROBLEM — F29 PSYCHOSIS (H): Status: ACTIVE | Noted: 2021-11-09

## 2021-11-09 PROCEDURE — 250N000013 HC RX MED GY IP 250 OP 250 PS 637: Performed by: PSYCHIATRY & NEUROLOGY

## 2021-11-09 PROCEDURE — 99205 OFFICE O/P NEW HI 60 MIN: CPT | Performed by: PSYCHIATRY & NEUROLOGY

## 2021-11-09 PROCEDURE — 128N000001 HC R&B CD/MH ADULT

## 2021-11-09 RX ORDER — TRAZODONE HYDROCHLORIDE 50 MG/1
50 TABLET, FILM COATED ORAL
Status: DISCONTINUED | OUTPATIENT
Start: 2021-11-09 | End: 2021-12-03 | Stop reason: HOSPADM

## 2021-11-09 RX ORDER — OLANZAPINE 10 MG/1
10 TABLET ORAL 3 TIMES DAILY PRN
Status: DISCONTINUED | OUTPATIENT
Start: 2021-11-09 | End: 2021-12-03 | Stop reason: HOSPADM

## 2021-11-09 RX ORDER — POLYETHYLENE GLYCOL 3350 17 G/17G
17 POWDER, FOR SOLUTION ORAL DAILY PRN
Status: DISCONTINUED | OUTPATIENT
Start: 2021-11-09 | End: 2021-12-03 | Stop reason: HOSPADM

## 2021-11-09 RX ORDER — GABAPENTIN 100 MG/1
100 CAPSULE ORAL EVERY 6 HOURS PRN
Status: DISCONTINUED | OUTPATIENT
Start: 2021-11-09 | End: 2021-12-03 | Stop reason: HOSPADM

## 2021-11-09 RX ORDER — SWAB
1 SWAB, NON-MEDICATED MISCELLANEOUS DAILY
Status: ON HOLD | COMMUNITY
End: 2021-11-09

## 2021-11-09 RX ORDER — LITHIUM CARBONATE 300 MG/1
600 TABLET, FILM COATED, EXTENDED RELEASE ORAL 2 TIMES DAILY
Status: ON HOLD | COMMUNITY
End: 2021-12-02

## 2021-11-09 RX ORDER — OLANZAPINE 10 MG/2ML
10 INJECTION, POWDER, FOR SOLUTION INTRAMUSCULAR 3 TIMES DAILY PRN
Status: DISCONTINUED | OUTPATIENT
Start: 2021-11-09 | End: 2021-12-03 | Stop reason: HOSPADM

## 2021-11-09 RX ORDER — PHENOL 1.4 %
10 AEROSOL, SPRAY (ML) MUCOUS MEMBRANE
COMMUNITY
End: 2021-12-14

## 2021-11-09 RX ORDER — ACETAMINOPHEN 325 MG/1
650 TABLET ORAL EVERY 4 HOURS PRN
Status: DISCONTINUED | OUTPATIENT
Start: 2021-11-09 | End: 2021-12-03 | Stop reason: HOSPADM

## 2021-11-09 RX ORDER — MAGNESIUM HYDROXIDE/ALUMINUM HYDROXICE/SIMETHICONE 120; 1200; 1200 MG/30ML; MG/30ML; MG/30ML
30 SUSPENSION ORAL EVERY 4 HOURS PRN
Status: DISCONTINUED | OUTPATIENT
Start: 2021-11-09 | End: 2021-12-03 | Stop reason: HOSPADM

## 2021-11-09 RX ADMIN — ACETAMINOPHEN 650 MG: 325 TABLET ORAL at 21:23

## 2021-11-09 ASSESSMENT — ACTIVITIES OF DAILY LIVING (ADL)
TOILETING_ISSUES: NO
CONCENTRATING,_REMEMBERING_OR_MAKING_DECISIONS_DIFFICULTY: NO
DIFFICULTY_COMMUNICATING: NO
DRESSING/BATHING_DIFFICULTY: NO
DOING_ERRANDS_INDEPENDENTLY_DIFFICULTY: NO
FALL_HISTORY_WITHIN_LAST_SIX_MONTHS: NO
HYGIENE/GROOMING: HANDWASHING
DRESS: STREET CLOTHES
DRESS: STREET CLOTHES
ORAL_HYGIENE: INDEPENDENT
PATIENT_/_FAMILY_COMMUNICATION_STYLE: SPOKEN LANGUAGE (ENGLISH OR BILINGUAL)
WALKING_OR_CLIMBING_STAIRS_DIFFICULTY: OTHER (SEE COMMENTS)
WEAR_GLASSES_OR_BLIND: NO
DIFFICULTY_EATING/SWALLOWING: NO
ADL_ASSESSMENT: WDL
HYGIENE/GROOMING: INDEPENDENT
ORAL_HYGIENE: INDEPENDENT
HEARING_DIFFICULTY_OR_DEAF: NO
ORAL_HYGIENE: INDEPENDENT
DRESS: STREET CLOTHES

## 2021-11-09 ASSESSMENT — MIFFLIN-ST. JEOR: SCORE: 530.8

## 2021-11-09 NOTE — ED NOTES
Patient awake asking to use restroom. Ambulated with steady to bathroom. Calm and cooperative at this time, agreeable to speak with DEC. Provided with dinner tray.

## 2021-11-09 NOTE — ED NOTES
Sleeping between interactions with staff. Slowed. Preoccupied. Spiritism themes to conversation. Clutching bible at all times. Did complete ADL's and ordered/ate breakfast with prompts.
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
## 2021-11-09 NOTE — ED PROVIDER NOTES
Patient signed out to me. 3:06 AM - patient evaluated by DEC who recommends further monitoring and trying to obtain collateral information in the morning. Patient now in improved condition and stable for transfer to [facility] for further mental health evaluation and monitoring. Patient Medically cleared for transfer to [facility].      Paul Arvizu MD  11/09/21 0308

## 2021-11-09 NOTE — ED PROVIDER NOTES
ED course:  Patient received as a handoff from my partner Dr. Chang.  On face-to-face evaluation patient continues to demonstrate evidence of psychosis.  She is still pending DEC evaluation and remains on 72-hour hold.  She remained stable and calm throughout my care.  Signed out to my partner Dr. Arvizu    Impression:    ICD-10-CM    1. Bipolar affective disorder, current episode mixed, current episode severity unspecified (H)  F31.60        Disposition:  Pending DEC evaluation; will likely require inpatient mental health admission         Christiano Waldrop, DO  11/09/21 0113
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
## 2021-11-09 NOTE — ED NOTES
Pt received in bed, resting, easy to awake. Vitals obtained, pt asking for warm blankets and remote for the TV. Pt denies any pain or concerns
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
## 2021-11-09 NOTE — PROGRESS NOTES
"Patient agreeable to transfer to [facility] 4500. Transfer instructions reviewed with patient. Patient currently denies SI and HI. Patient ate courtesy meal for dinner. Patient reading bible and singing intermittently. At time of transfer patient stated \"Once I clear up and get better, I want to sing in [place].\" All belongings that were brought into the hospital have been returned to patient. Escorted off the unit at 1753 accompanied by EMS. Transferred to [facility] via EMS  "
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
## 2021-11-09 NOTE — PLAN OF CARE
"Pt brought to EmPATH for further eval. She is alert, agreeable, calm, and cooperative. She denies SI/HI, or hallucinations. She does report \"pain in my vagina , there is scar tissue.\" She ambulates slow, steady. Reports smokes marijuana, denies ETOH use. She reports she has not been taking her medications, \"for awhile.\" She is sleepy and \"wants to lie down for now.\" Did not wish to review her medications. She is wearing a Rosary and asked that \"I please don't take it away, I'm very Scientologist.\" She contracts for safety and is not suicidal, allowed to keep and will monitor her- she is seated near the Nurse Station, she is reading the Bible also. She is wearing her own clothing and does not wish to change into scrubs, search was completed and her shoes were placed in a locker-no other belongings with her. She was given tour and routine of unit was explained, she was very thankful. She had a snack and is currently asleep.   "
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
## 2021-11-10 PROBLEM — F43.10 PTSD (POST-TRAUMATIC STRESS DISORDER): Status: ACTIVE | Noted: 2021-11-10

## 2021-11-10 PROBLEM — R10.2 VAGINAL PAIN: Status: ACTIVE | Noted: 2021-11-10

## 2021-11-10 PROBLEM — F31.2 BIPOLAR I DISORDER, CURRENT OR MOST RECENT EPISODE MANIC, WITH PSYCHOTIC FEATURES (H): Status: ACTIVE | Noted: 2021-11-10

## 2021-11-10 LAB
CLUE CELLS: ABNORMAL
TRICHOMONAS, WET PREP: ABNORMAL
WBC'S/HIGH POWER FIELD, WET PREP: ABNORMAL
YEAST, WET PREP: ABNORMAL

## 2021-11-10 PROCEDURE — 250N000013 HC RX MED GY IP 250 OP 250 PS 637: Performed by: PSYCHIATRY & NEUROLOGY

## 2021-11-10 PROCEDURE — 99223 1ST HOSP IP/OBS HIGH 75: CPT | Performed by: PSYCHIATRY & NEUROLOGY

## 2021-11-10 PROCEDURE — 250N000013 HC RX MED GY IP 250 OP 250 PS 637: Performed by: NURSE PRACTITIONER

## 2021-11-10 PROCEDURE — 87210 SMEAR WET MOUNT SALINE/INK: CPT | Performed by: NURSE PRACTITIONER

## 2021-11-10 PROCEDURE — 128N000001 HC R&B CD/MH ADULT

## 2021-11-10 RX ORDER — QUETIAPINE FUMARATE 300 MG/1
300 TABLET, FILM COATED ORAL AT BEDTIME
Status: COMPLETED | OUTPATIENT
Start: 2021-11-12 | End: 2021-11-12

## 2021-11-10 RX ORDER — QUETIAPINE FUMARATE 50 MG/1
50 TABLET, FILM COATED ORAL ONCE
Status: COMPLETED | OUTPATIENT
Start: 2021-11-10 | End: 2021-11-10

## 2021-11-10 RX ORDER — QUETIAPINE FUMARATE 200 MG/1
600 TABLET, FILM COATED ORAL AT BEDTIME
Status: DISCONTINUED | OUTPATIENT
Start: 2021-11-16 | End: 2021-11-30

## 2021-11-10 RX ORDER — QUETIAPINE FUMARATE 100 MG/1
100 TABLET, FILM COATED ORAL AT BEDTIME
Status: COMPLETED | OUTPATIENT
Start: 2021-11-10 | End: 2021-11-10

## 2021-11-10 RX ORDER — QUETIAPINE FUMARATE 200 MG/1
400 TABLET, FILM COATED ORAL AT BEDTIME
Status: COMPLETED | OUTPATIENT
Start: 2021-11-13 | End: 2021-11-15

## 2021-11-10 RX ORDER — QUETIAPINE FUMARATE 200 MG/1
200 TABLET, FILM COATED ORAL AT BEDTIME
Status: COMPLETED | OUTPATIENT
Start: 2021-11-11 | End: 2021-11-11

## 2021-11-10 RX ADMIN — CHOLECALCIFEROL (VITAMIN D3) 10 MCG (400 UNIT) TABLET 10 MCG: at 13:51

## 2021-11-10 RX ADMIN — QUETIAPINE FUMARATE 50 MG: 50 TABLET ORAL at 13:51

## 2021-11-10 RX ADMIN — TRAZODONE HYDROCHLORIDE 50 MG: 50 TABLET ORAL at 20:17

## 2021-11-10 RX ADMIN — QUETIAPINE FUMARATE 100 MG: 100 TABLET ORAL at 20:16

## 2021-11-10 RX ADMIN — ACETAMINOPHEN 650 MG: 325 TABLET ORAL at 09:19

## 2021-11-10 RX ADMIN — ACETAMINOPHEN 650 MG: 325 TABLET ORAL at 18:23

## 2021-11-10 ASSESSMENT — ACTIVITIES OF DAILY LIVING (ADL)
ORAL_HYGIENE: INDEPENDENT
HYGIENE/GROOMING: INDEPENDENT
DRESS: INDEPENDENT

## 2021-11-10 NOTE — PROGRESS NOTES
Pt was approached in the comfort room for a group invite. Pt spoke quietly and avoided eye contact. Pt politely declined and said she would prefer playing the piano.      11/10/21 3165   Engagement   Intervention Group   Topic Detail Scratch art for concentration, focus, creativity,eye-hand coordination, fine motor skills, self-confidence, and frustration tolerance.   Attendance Did not attend   Reason for Not Attending Refused   Supervision   Supervision Direct supervision provided
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
## 2021-11-10 NOTE — PLAN OF CARE
Problem: Suicidal Behavior  Goal: Suicidal Behavior is Absent or Managed  Outcome: No Change     Problem: Behavioral Health Plan of Care  Goal: Optimized Coping Skills in Response to Life Stressors  Outcome: No Change     Problem: Behavioral Health Plan of Care  Goal: Develops/Participates in Therapeutic Mather to Support Successful Transition  Outcome: No Change    Pt has remained in bed all shift, no complaints or needs overnight. 7 hours sleep

## 2021-11-10 NOTE — PROGRESS NOTES
11/10/21 4863   Engagement   Intervention Group   Topic Detail OT: Education regarding cognitive and physical wellness and interactive social activity (Exercise & cognitive Jenja) to increase concentration, attention to task/detail, focus, cognitive processing speed, fine motor skills, coping with stress, cognitive wellness, and physical wellness   Attendance Did not attend

## 2021-11-10 NOTE — PLAN OF CARE
"Friend Chidi called for update on patient, ALEJANDRA signed and in chart. Chidi wanted to express her concerns regarding pt recent behavior and to ensure she is not discharged before stable. Chidi reported that patient stayed with her in her home for one night and one day with the possibility of staying longer. Per Chidi pt started to exhibit odd behaviors that made Chidi uncomfortable and prompted the ED visit. Chidi reported patient talked about her \"telepathic burciaga\"  reported that she communicated with her  sister and that she would frequently change her voice pattern.  Chidi reported she became uncomfortable when pt asked her (chidi) to put her fingers in her vagina to feel the internal injury. Pt later spoke with Chidi on the phone.  Rj Mcnulty RN  "
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
## 2021-11-10 NOTE — PROGRESS NOTES
LEGAL:  Petition for Commitment prepared and filed with [facility] on 11/10/2021.  TC to ; intake completed.  Faxed requested information to intake. SW to follow regarding legal.  CAITLIN Eddy  11/10/2021 12:51 PM
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
## 2021-11-10 NOTE — PHARMACY-ADMISSION MEDICATION HISTORY
Admission medication history interview status for the 11/9/2021 admission is complete. See Epic admission navigator for allergy information, pharmacy, prior to admission medications and immunization status.     PERTINENT PROVIDER INFORMATION: [facility]     Medication history interview sources:  Nurse, Hospital Records    Changes made to PTA medication list (reason)  Added: Vit D3, Lithium, Melatonin  Deleted: NA  Changed: NA    Additional medication history information (including reliability of information, actions taken by pharmacist):None      Prior to Admission medications    Medication Sig Last Dose Taking? Auth Provider   cholecalciferol (VITAMIN D3) 10 mcg (400 units) TABS tablet Take 10 mcg by mouth daily Unknown at Unknown time Yes Unknown, Entered By History   lithium ER (LITHOBID) 300 MG CR tablet Take 600 mg by mouth 2 times daily  Past Month at Unknown time Yes Unknown, Entered By History   Melatonin 10 MG TABS tablet Take 10 mg by mouth nightly as needed  11/8/2021 at Unknown time Yes Unknown, Entered By History         Medication history completed by: Felix Nolan RPH on 11/9/2021 at 8:10 PM

## 2021-11-11 PROCEDURE — 250N000013 HC RX MED GY IP 250 OP 250 PS 637: Performed by: NURSE PRACTITIONER

## 2021-11-11 PROCEDURE — 128N000001 HC R&B CD/MH ADULT

## 2021-11-11 PROCEDURE — 99232 SBSQ HOSP IP/OBS MODERATE 35: CPT | Performed by: PSYCHIATRY & NEUROLOGY

## 2021-11-11 PROCEDURE — 250N000013 HC RX MED GY IP 250 OP 250 PS 637: Performed by: PSYCHIATRY & NEUROLOGY

## 2021-11-11 RX ADMIN — ACETAMINOPHEN 650 MG: 325 TABLET ORAL at 21:44

## 2021-11-11 RX ADMIN — CHOLECALCIFEROL (VITAMIN D3) 10 MCG (400 UNIT) TABLET 10 MCG: at 09:05

## 2021-11-11 RX ADMIN — QUETIAPINE FUMARATE 200 MG: 200 TABLET ORAL at 20:19

## 2021-11-11 ASSESSMENT — ACTIVITIES OF DAILY LIVING (ADL)
LAUNDRY: WITH SUPERVISION
DRESS: INDEPENDENT
ORAL_HYGIENE: INDEPENDENT
HYGIENE/GROOMING: SHOWER
ORAL_HYGIENE: INDEPENDENT
LAUNDRY: WITH SUPERVISION
HYGIENE/GROOMING: INDEPENDENT
DRESS: INDEPENDENT

## 2021-11-11 NOTE — PLAN OF CARE
Problem: Behavioral Health Plan of Care  Goal: Plan of Care Review  Outcome: Improving     Problem: Suicidal Behavior  Goal: Suicidal Behavior is Absent or Managed  Outcome: Improving   Pt appeared sleeping  for  about 7 hours this shift    Respirations are even and unlabored.    Safety checks completed throughout the night per protocol.     No discomfort or pain verbalized this shift    No harm to self and others noted or reported this shift.     No SI/HI or hallucination noted or reported this shift    Will continue to monitor and assist as needed.
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
## 2021-11-11 NOTE — PLAN OF CARE
Problem: Behavioral Health Plan of Care  Goal: Plan of Care Review  Outcome: No Change  Flowsheets  Taken 11/11/2021 1720 by Awilda Bentley RN  Progress: improving  Taken 11/11/2021 1100 by Deirdre Venegas RN  Plan of Care Reviewed With: patient  Patient Agreement with Plan of Care: agrees  Pt isolative in room during first half  of shift. Pt however pleasant on approach. Pt came out for meal and hygiene/self care tasks. [facility] 7pm Denies all psyche symptoms  and contracts for safety. Pt initially denied pain, but requested and had Tylenol at HS for cramps.Pt took scheduled medications without difficultly. Monitoring continues.

## 2021-11-11 NOTE — PROGRESS NOTES
11/11/21 1000   Engagement   Intervention Group   Topic Detail OT: Bridge to Self-Confidence activity to promote self-awareness, self-esteem, insight development, values, sequencing/attention, and communication skills.    Attendance Did not attend
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
## 2021-11-11 NOTE — PLAN OF CARE
BEHAVIORAL TEAM DISCUSSION    Participants: Nurse: Sheeba WALL, Social Wk: David ROBB, Provider: NICKO Sherman, OT: Alicia ORDONEZ, Pharm: Hina ORDONEZ  Progress: Improving  Anticipated length of stay: 1-2 weeks  Continued Stay Criteria/Rationale: Symptoms Stabilization, Medication Management, Care Coordination, Petitioning for civil commitment.  Medical/Physical: Has been seen by Hospitalist.  Precautions:   Behavioral Orders   Procedures    Code 1 - Restrict to Unit    Routine Programming     As clinically indicated    Status 15     Every 15 minutes.     Plan: Once stabilized psychiatrically and court proceedings are completed, a comprehensive discharge plan will be developed.    Rationale for change in precautions or plan:

## 2021-11-11 NOTE — PLAN OF CARE
"  Assessment/Intervention/Current Symtoms and Care Coordination  Writer met with Bibiana, she presented with clearer thinking than yesterday.She seems to be preoccupied with Synagogue thoughts. She is pleasant and cooperative.  Bibiana appears adamant that no information be given out to her parents, and that she is triggered when communicating with them. She asked if she can be adopted by someone else and wondered how she can get rid of her last name. Writer asked if she is her own guardian or if her parents have obtained guardianship after she turned 16. She indicated that she is her own guardian.   No ALEJANDRA for parents. ALEJANDRA signed for her friend, Maya. Bibiana signed an ALEJANDRA for a therapist she was seeing at the [facility] summer of 2020. Write called to gather collateral & faxed the ALEJANDRA. The therapist was an intern, so the intern's supervisor will call back with collateral.  Bibiana reports that she is \"OK\" and has low energy. Goals for today are to take a shower, do laundry, play the keyboard and be \"kind and gentle\" to herself. She will read her bible, and states, \"I'm very committed to the Lord and Prabhjot Moe\". She reports that after discharge, she would return to her friend's home and look for a job possibly in a restaurant. 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  Writer explained the civil commitment process, and that she would be eligible for a . She seemed interested in resources including applying for social security, finding a supportive living environment, and job assistance.      Discharge Plan or Goal  After symptom stabilization and response from courts regarding petition for civil commitment w/ ek[facility] in [facility], a comprehensive safe discharge plan will be developed. She reports that she can return to her friend's home.    Barriers to Discharge   Symptom stabilization, Medication Management, Care Coordination, 72 hr hold    Referral Status  None at this time    Legal " Status  72 hour hold expires 11/12/2021 at 445.  Petition for Commitment filed with [facility] on 11/10/2021.  ACRLIE [facility], 11/11/2021, 9:40 AM
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
## 2021-11-11 NOTE — PROGRESS NOTES
"Occupational Therapy   AIDET      Patient was introduced to the role of occupational therapy and oriented to the process of occupational therapy services on the unit, including function of groups. Patient was given the Occupational Therapy Questionnaire to complete. Patient was in the lounge upon approach. Patient is pleasant, calm, and spoke quietly. Patient seems to be Pentecostal preoccupied and was constantly looking at the bible during interaction. Patient presents as paranoid and was observed constantly looking behind her. Therapist gently assured patient that there was no one behind her; however, patient continued to look behind her. Patient explained to therapist that she isolates in her room because \"socializing can be hard with new people.\" Patient expressed interest to attend group when she feels more comfortable. OT will follow up with assessment and address any questions, needs, or concerns.    Therapist: Catia Acevedo, Student   11/11/2021    "

## 2021-11-12 PROCEDURE — 250N000013 HC RX MED GY IP 250 OP 250 PS 637: Performed by: PSYCHIATRY & NEUROLOGY

## 2021-11-12 PROCEDURE — 99231 SBSQ HOSP IP/OBS SF/LOW 25: CPT | Performed by: PSYCHIATRY & NEUROLOGY

## 2021-11-12 PROCEDURE — 250N000013 HC RX MED GY IP 250 OP 250 PS 637: Performed by: NURSE PRACTITIONER

## 2021-11-12 PROCEDURE — 128N000001 HC R&B CD/MH ADULT

## 2021-11-12 RX ADMIN — CHOLECALCIFEROL (VITAMIN D3) 10 MCG (400 UNIT) TABLET 10 MCG: at 08:27

## 2021-11-12 RX ADMIN — QUETIAPINE FUMARATE 300 MG: 300 TABLET ORAL at 20:36

## 2021-11-12 ASSESSMENT — ACTIVITIES OF DAILY LIVING (ADL)
ORAL_HYGIENE: INDEPENDENT
DRESS: STREET CLOTHES
LAUNDRY: WITH SUPERVISION
ORAL_HYGIENE: INDEPENDENT
HYGIENE/GROOMING: INDEPENDENT
HYGIENE/GROOMING: INDEPENDENT

## 2021-11-12 NOTE — PROGRESS NOTES
"   11/12/21 0900   Occupational Therapy Psychosocial Assessment   Assessment Type Interview Form;Chart Review;Other (see comments)  (Observation)   Prior Level of Function   People in home friend(s)  (Living with 1 friend--per chart review)   Therapy Assessment   Patient Presentation Patient observed to be out on the unit and social with peers and staff. Patient observed to be wearing clean clothing and was neat and tidy.   Patient-identified areas of success Time spent with family or friends;Time spent relaxing and enjoying;Concentrating on own tasks;Taking care of the place of living;Managing basic needs (food, medicine, sleep)   Healthy Leisure Activities \"Singing, writing, danging to music with emotion\"   Patient-identified areas of difficulty Physical health/Chronic Pain;Lacks support;Finances;Sleeping too much or not enough;Bothered by touch, texture, or movement;Living environment;Transportation  (Lacks emotional & phyiscal support)   Patient-identified sources of support Safe place to live;Family, friends or caregivers to help when needed;A best frient or significant other;Belief in self and abilities;Access to email, social media, phone calls   Professional support details Pt reported she enjoys spending time with \"Toni & Jose Francisco\"   Patient-identified emotional, physical or mental health concerns \"Trauma; recovery is intensely distressing\"   Patient-identified coping stategies for these concerns \"Creativity\"   Patient-identified stressors or changes in the past year \"I've been in intensive trauma recovery\"   Patient-identified values \"Trust\"   Patient's goal for the future \"To build a safe environment to live and let go.\"   Patient's goals to work on with OT Feel better;Improve own relationships with others;Handle own frustrations   Clinical Impression   Patient Personal Strengths community support;interests/hobbies;positive educational history  (Has a sister and friends)   Pt appears to have the following " barriers/limitations Medication noncompliance;Poorly managed mental health symptoms;Lack of daily routine   Patient's barriers/limitations contribute to Exacerbation of mental health symptoms;Poor follow through with treatment recommendations   Planned Interventions Encourage group attendance;Identify and develop coping strategies;Continue to build rapport;Engage in activities for insight development   Risk & Benefits of therapy have been explained evaluation/treatment results reviewed;care plan/treatment goals reviewed   Minutes Spent with Patient 0--Patient unavailable to complete follow-up interview   Beh OT Plan for Next Session Target: Patient will attend and engage in at least 1 OT group daily.

## 2021-11-12 NOTE — PROGRESS NOTES
11/12/21 1500   Engagement   Intervention Group   Topic Detail OT: Education regarding self-care/relaxation and self-care sampler of activities to promote daily living skills, relaxation, healthy coping skills, sensory techniques, and healthy liesure opportunity   Attendance Attended   Patient Response Demonstrated understanding of materials provided;Expressed feelings/issues;Was respectful;Demonstrated leadership;Improved mood in response to group;Positive attitude   Concentrated on Task duration of group   Cognition Goal-directed;Takes initiative   Mood/Affect Content;Pleasant   Social/Behavioral Engaged;Cooperative;Motivated   Thought Content Insightful   Goals addressed in session today Pt participated actively in group, initially focused on reading Bible though once activities started she was invested in the group. Pt trialed oshibori and use of essential oils, nail care, massage tools, and lotion. Pt able to set boundaries with an intrusive peer in a respectful manner. Positive response to group. Took coloring sheets/word find for use over weekend.

## 2021-11-12 NOTE — PLAN OF CARE
Problem: Behavioral Health Plan of Care  Goal: Plan of Care Review  Outcome: Improving     Problem: Suicidal Behavior  Goal: Suicidal Behavior is Absent or Managed  Outcome: Improving     Problem: Sleep Disturbance  Goal: Adequate Sleep/Rest  Outcome: Improving

## 2021-11-12 NOTE — PROGRESS NOTES
11/12/21 1000   Engagement   Intervention Group   Topic Detail OT: [facility] [place] activity to promote movement/exercise, socialization, self-awareness/insight, communication skills, and concentration.     Attendance Did not attend   Reason for Not Attending Refused

## 2021-11-12 NOTE — PROVIDER NOTIFICATION
Pt asked if she is interested in COVID vaccination. Pt reports she has already been vaccinated. Not showing up in immunizations. Will record as declined at this time.

## 2021-11-12 NOTE — PLAN OF CARE
Problem: Suicidal Behavior  Goal: Suicidal Behavior is Absent or Managed  Outcome: Improving     Problem: Activity and Energy Impairment (Anxiety Signs/Symptoms)  Goal: Optimized Energy Level (Anxiety Signs/Symptoms)  Outcome: Improving     Problem: Cognitive Impairment (Anxiety Signs/Symptoms)  Goal: Optimized Cognitive Function (Anxiety Signs/Symptoms)  Outcome: Improving     Problem: Mood Impairment (Anxiety Signs/Symptoms)  Goal: Improved Mood Symptoms (Anxiety Signs/Symptoms)  Outcome: Improving     Problem: Activity and Energy Impairment (Depressive Signs/Symptoms)  Goal: Optimized Energy Level (Depressive Signs/Symptoms)  Outcome: Improving     Problem: Cognitive Impairment (Depressive Signs/Symptoms)  Goal: Optimized Cognitive Function (Depressive Signs/Symptoms)  Outcome: Improving     Problem: Feelings of Worthlessness, Hopelessness or Excessive Guilt (Depressive Signs/Symptoms)  Goal: Enhanced Self-Esteem and Confidence (Depressive Signs/Symptoms)  Outcome: Improving     Problem: Mood Impairment (Depressive Signs/Symptoms)  Goal: Improved Mood Symptoms (Depressive Signs/Symptoms)  Outcome: Improving     Patient presents with a flat and blunted affect. Pt's behavior observed as pleasant, calm and cooperative. Pt was visible in  the milieu for meals, walked around with her open Bible in her arms in th morning, left it in her room this afternoon stating it needed to be in a safe place. Pt was observed dancing in the lounge by herself, laughing and appeared to be responding to external stimuli but denied any AH/VH when assessed. Pt was A/O and thought content observed to be clear and organized but insight noted to be poor with impaired judgement. Speech was clear and appropriate. Pt ate well for meals% . Patient denied having any anxiety or depression, denied having any SI/SIB,HI; contracted for safety. Patient denied having any pain or discomfort. Safety checks completed every 15 minutes this shift.

## 2021-11-12 NOTE — PLAN OF CARE
Assessment/Intervention/Current Symtoms and Care Coordination  Writer observed Bibiana meeting with the pre petition screener from [facility] in a private office this morning. Hospital legal  reports sending current documentation to pre petition screener. Writer explained to Bibiana that a response from the courts as to whether the petition is supported or not will  be faxed to the unit this afternoon. She asked what would happen if it was not approved. Writer encouraged her to continue her stay for further symptom stabilization, medication management and for developing a safe discharge plan. Community resource referrals will be made as well. She seemed agreeable.    Pt asked to have computer time to learn about restraining orders and whether this is something she wants to pursue.  Pt wanted to end the session , so that she can attend the OT group on self-care.     Writer connecting with financial  to verify insurance with Medicaid [place].    Discharge Plan or Goal  After symptom stabilization and response from courts regarding petition for civil commitment w/ dash in [facility], a comprehensive safe discharge plan will be developed. She reports that she can return to her friend's home.     Barriers to Discharge   Symptom stabilization, Medication Management, Care Coordination, 72 hr hold     Referral Status  Interested in resources including applying for social security, finding a supportive living environment, and job assistance.       Legal Status  72 hour hold expires 11/12/2021 at 445.  Petition for Commitment filed with [facility] on 11/10/2021.

## 2021-11-12 NOTE — PROGRESS NOTES
LEGAL:  Deirdre KEENE, is the [facility] assigned Pre-Petition Screener.  Additional records requested and faxed to Deirdre.  Writer to follow for legal.  CAITLIN Eddy  11/12/2021 12:14 PM

## 2021-11-12 NOTE — PLAN OF CARE
Problem: Behavioral Health Plan of Care  Goal: Plan of Care Review  Outcome: No Change  Flowsheets  Taken 11/12/2021 0900 by Isa Mendoza RN  Plan of Care Reviewed With: patient  Patient Agreement with Plan of Care: agrees  Taken 11/11/2021 1720 by Awilda Bentley RN  Progress: improving  Goal: Patient-Specific Goal (Individualization)  Outcome: No Change  Flowsheets (Taken 11/12/2021 1700)  Patient Vulnerabilities:    limited social skills    lacks insight into illness  Patient Personal Strengths:    good impulse control    independent living skills    interests/hobbies  Note: Shift Summery:      Patient's Stated Goal for Shift:      Goal Status:    Pt is calm, pleasant/cooperative but continues to be somewhat isolative- minimal engagement with peers even when  out on milieu. Spent time reading  the Bible, sang/ played key board as well as watch a movie.D  Pt denies SI/HI, A/VH, anxiety and depression. No verbal complains or noted non verbal signs of discomfort. Pt contracts for safety and is medication compliant, No other concerns  Noted this shift.

## 2021-11-12 NOTE — PROGRESS NOTES
Pt appeared sleeping for about  7  hours this shift.    No distress noted or reported this shift    Pt continue on 15 minutes checks  for safety per protocol    No pain or discomfort noted or reported    No SI/HI/SIB noted or reported this shift    No  inappropriate behaviors noted or reported this shift    No harm to self and others noted or reported.     Will continue to monitor and assist as needed.

## 2021-11-13 PROCEDURE — 250N000013 HC RX MED GY IP 250 OP 250 PS 637: Performed by: NURSE PRACTITIONER

## 2021-11-13 PROCEDURE — 128N000001 HC R&B CD/MH ADULT

## 2021-11-13 PROCEDURE — 250N000013 HC RX MED GY IP 250 OP 250 PS 637: Performed by: PSYCHIATRY & NEUROLOGY

## 2021-11-13 RX ADMIN — QUETIAPINE FUMARATE 400 MG: 200 TABLET ORAL at 20:45

## 2021-11-13 RX ADMIN — CHOLECALCIFEROL (VITAMIN D3) 10 MCG (400 UNIT) TABLET 10 MCG: at 08:31

## 2021-11-13 ASSESSMENT — ACTIVITIES OF DAILY LIVING (ADL)
HYGIENE/GROOMING: INDEPENDENT
DRESS: INDEPENDENT
LAUNDRY: WITH SUPERVISION
ORAL_HYGIENE: INDEPENDENT

## 2021-11-13 ASSESSMENT — MIFFLIN-ST. JEOR: SCORE: 531.25

## 2021-11-13 NOTE — PLAN OF CARE
Problem: Sleep Disturbance  Goal: Adequate Sleep/Rest  Outcome: Improving   Pt slept all night for 8 hours, no c/o pain and no behaviors. No significant event. No PRN given. Will continue to monitor

## 2021-11-13 NOTE — PLAN OF CARE
"  Problem: Behavioral Health Plan of Care  Goal: Plan of Care Review  Outcome: Improving  Flowsheets  Taken 11/13/2021 1652  Plan of Care Reviewed With: patient  Taken 11/13/2021 1600  Plan of Care Reviewed With: patient  Patient Agreement with Plan of Care: agrees  Taken 11/11/2021 1720  Progress: improving  Goal: Patient-Specific Goal (Individualization)  Outcome: Improving  Flowsheets (Taken 11/13/2021 1652)  Patient Vulnerabilities: lacks insight into illness  Patient Personal Strengths:    expressive of emotions    expressive of needs    independent living skills    intellectual cognitive skills    positive attitude    self-awareness  Note: Shift Summery:     Patient's Stated Goal for Shift:     Goal Status:      Goal: Adheres to Safety Considerations for Self and Others  Outcome: Improving  Intervention: Develop and Maintain Individualized Safety Plan  Recent Flowsheet Documentation  Taken 11/13/2021 1600 by Awilda Bentley RN  Safety Measures: safety rounds completed  Goal: Absence of New-Onset Illness or Injury  Outcome: Improving  Intervention: Identify and Manage Fall Risk  Recent Flowsheet Documentation  Taken 11/13/2021 1600 by wAilda Bentley RN  Safety Measures: safety rounds completed     Problem: Cognitive Impairment (Anxiety Signs/Symptoms)  Goal: Optimized Cognitive Function (Anxiety Signs/Symptoms)  Outcome: Improving    Pt  is alert/oriented. Calm demeanor observed through out shift. Improved socialization also noted. Pt spent more time in lounge. Observed engaging with select peers,  and participated in community meeting. Pt reported that she was having an emotionally rough day; described it as \"being in the deep\" earlier in he day  but expressed optimism, self awareness and determination to  implement positive coping. Pt used journaling, Bible reading and music as coping tools. Pt stated these were effective in relieving her anxiety which she rated as 3 and depression of five.. Good intake noted " with evening meal. Pt is medication compliant.

## 2021-11-13 NOTE — PROGRESS NOTES
11/13/21 1702   Engagement   Intervention Group   Topic Detail Recovery or Happiness Collage for building self awareness, aspirational exercise to promote motivation for change, creative expression, organization/planning, coping, symptom management and socialization   Attendance Did not attend

## 2021-11-13 NOTE — PLAN OF CARE
Problem: Activity and Energy Impairment (Anxiety Signs/Symptoms)  Goal: Optimized Energy Level (Anxiety Signs/Symptoms)  Outcome: Improving     Problem: Cognitive Impairment (Anxiety Signs/Symptoms)  Goal: Optimized Cognitive Function (Anxiety Signs/Symptoms)  Outcome: Improving     Problem: Mood Impairment (Anxiety Signs/Symptoms)  Goal: Improved Mood Symptoms (Anxiety Signs/Symptoms)  Outcome: Improving     Problem: Activity and Energy Impairment (Depressive Signs/Symptoms)  Goal: Optimized Energy Level (Depressive Signs/Symptoms)  Outcome: Improving     Problem: Cognitive Impairment (Depressive Signs/Symptoms)  Goal: Optimized Cognitive Function (Depressive Signs/Symptoms)  Outcome: Improving     Problem: Feelings of Worthlessness, Hopelessness or Excessive Guilt (Depressive Signs/Symptoms)  Goal: Enhanced Self-Esteem and Confidence (Depressive Signs/Symptoms)  Outcome: Improving     Problem: Mood Impairment (Depressive Signs/Symptoms)  Goal: Improved Mood Symptoms (Depressive Signs/Symptoms)  Outcome: Improving     Patient presents with a  blunted and flat affect. Pleasant and cooperative on approach, isolative and withdrawn in room after breakfast, complained of not sleeping well.Visible on the unit before lunch, watching a movie with peers. Patient was not noted to have her Bible with her this morning, stated it helps her cope with all that is happening around her,  endorsed depression, denied any anxiety, denied SI/SIB, HI, AH/VH; contracted for safety.    Isa Mendoza RN

## 2021-11-14 PROCEDURE — 250N000013 HC RX MED GY IP 250 OP 250 PS 637: Performed by: PSYCHIATRY & NEUROLOGY

## 2021-11-14 PROCEDURE — 250N000013 HC RX MED GY IP 250 OP 250 PS 637: Performed by: NURSE PRACTITIONER

## 2021-11-14 PROCEDURE — 128N000001 HC R&B CD/MH ADULT

## 2021-11-14 RX ADMIN — QUETIAPINE FUMARATE 400 MG: 200 TABLET ORAL at 21:19

## 2021-11-14 RX ADMIN — TRAZODONE HYDROCHLORIDE 50 MG: 50 TABLET ORAL at 22:54

## 2021-11-14 RX ADMIN — CHOLECALCIFEROL (VITAMIN D3) 10 MCG (400 UNIT) TABLET 10 MCG: at 08:37

## 2021-11-14 NOTE — PLAN OF CARE
Problem: Sleep Disturbance  Goal: Adequate Sleep/Rest  Outcome: Improving  Pt was noted to be sleeping at the beginning of the shift, appears to have slept all night so far as observed on all safety checks.   Charly Fofana RN

## 2021-11-14 NOTE — PLAN OF CARE
Problem: Activity and Energy Impairment (Depressive Signs/Symptoms)  Goal: Optimized Energy Level (Depressive Signs/Symptoms)  Outcome: Improving     Problem: Cognitive Impairment (Depressive Signs/Symptoms)  Goal: Optimized Cognitive Function (Depressive Signs/Symptoms)  Outcome: Improving     Problem: Feelings of Worthlessness, Hopelessness or Excessive Guilt (Depressive Signs/Symptoms)  Goal: Enhanced Self-Esteem and Confidence (Depressive Signs/Symptoms)  Outcome: Improving     Problem: Mood Impairment (Depressive Signs/Symptoms)  Goal: Improved Mood Symptoms (Depressive Signs/Symptoms)  Outcome: Improving     Patient presents with a bright affect. Pt's behavior observed as pleasant, calm and cooperative. Pt was intermittently visible on the unit. Pt attended morning group. Pt social with staff and  peers. Pt was A/O and thought content observed to be clear and organized. Speech was appropriate. Patient denied having any anxiety or depression, denied having any SI/SIB,HI,AH/VH; contracted for safety. Patient denied having any pain or discomfort. Safety checks completed every 15 minutes this shift.     Isa Mendoza RN

## 2021-11-14 NOTE — PROGRESS NOTES
11/14/21 1020   Engagement   Intervention Group   Topic Detail Creative endeavor (water color) for relaxation, healthy leisure, coping with symptoms through distraction, attention to detail, improving feelings of self-worth.   Attendance Attended   Patient Response Demonstrated understanding of materials provided;Prosocial behavior;Improved mood in response to group   Concentrated on Task duration of group   Cognition Goal-directed   Mood/Affect Tolerates frustration;Bright;Content;Pleasant   Social/Behavioral Cooperative;Engaged   Thought Content Reality oriented   Goals addressed in session today Patient's affect was bright and she worked in a goal directed manner with good attention to detail. She demonstrated appropriate assertion of boundaries with a peer by asking him not to sneak up on her an whisper in her ear.

## 2021-11-15 PROCEDURE — 250N000013 HC RX MED GY IP 250 OP 250 PS 637: Performed by: NURSE PRACTITIONER

## 2021-11-15 PROCEDURE — 128N000001 HC R&B CD/MH ADULT

## 2021-11-15 PROCEDURE — 250N000013 HC RX MED GY IP 250 OP 250 PS 637: Performed by: PSYCHIATRY & NEUROLOGY

## 2021-11-15 PROCEDURE — 99232 SBSQ HOSP IP/OBS MODERATE 35: CPT | Performed by: PSYCHIATRY & NEUROLOGY

## 2021-11-15 RX ORDER — IBUPROFEN 200 MG
400 TABLET ORAL EVERY 6 HOURS PRN
Status: DISCONTINUED | OUTPATIENT
Start: 2021-11-15 | End: 2021-12-03 | Stop reason: HOSPADM

## 2021-11-15 RX ADMIN — QUETIAPINE FUMARATE 400 MG: 200 TABLET ORAL at 20:16

## 2021-11-15 RX ADMIN — CHOLECALCIFEROL (VITAMIN D3) 10 MCG (400 UNIT) TABLET 10 MCG: at 09:31

## 2021-11-15 RX ADMIN — ACETAMINOPHEN 650 MG: 325 TABLET ORAL at 20:16

## 2021-11-15 RX ADMIN — ACETAMINOPHEN 650 MG: 325 TABLET ORAL at 09:31

## 2021-11-15 ASSESSMENT — ACTIVITIES OF DAILY LIVING (ADL)
DRESS: STREET CLOTHES
HYGIENE/GROOMING: INDEPENDENT
HYGIENE/GROOMING: INDEPENDENT
LAUNDRY: WITH SUPERVISION
ORAL_HYGIENE: INDEPENDENT
ORAL_HYGIENE: INDEPENDENT
DRESS: STREET CLOTHES;SCRUBS (BEHAVIORAL HEALTH)

## 2021-11-15 NOTE — PLAN OF CARE
BEHAVIORAL TEAM DISCUSSION    Participants: Nurse: Zandra HUTCHISON, Social Wk: David ROBB, Provider: NICKO Sherman, OT: Maria Esther ORDONEZ, Pharm: Emanuel CHIANG   Progress: Improving  Anticipated length of stay: 1-2 weeks  Continued Stay Criteria/Rationale: symptom stabilization, medication management, care coordination, civil commitment process  Medical/Physical: no significant events, Seen by Hospitalist 11/10 for vaginal pain  Precautions:   Behavioral Orders   Procedures    Code 1 - Restrict to Unit    Routine Programming     As clinically indicated    Status 15     Every 15 minutes.     Plan: A comprehensive discharge plan will be developed after court proceedings conclude.   Rationale for change in precautions or plan:

## 2021-11-15 NOTE — PLAN OF CARE
Problem: Activity and Energy Impairment (Anxiety Signs/Symptoms)  Goal: Optimized Energy Level (Anxiety Signs/Symptoms)  Outcome: Improving     Problem: Cognitive Impairment (Anxiety Signs/Symptoms)  Goal: Optimized Cognitive Function (Anxiety Signs/Symptoms)  Outcome: Improving     Problem: Mood Impairment (Anxiety Signs/Symptoms)  Goal: Improved Mood Symptoms (Anxiety Signs/Symptoms)  Outcome: Improving     Problem: Activity and Energy Impairment (Depressive Signs/Symptoms)  Goal: Optimized Energy Level (Depressive Signs/Symptoms)  Outcome: Improving     Problem: Cognitive Impairment (Depressive Signs/Symptoms)  Goal: Optimized Cognitive Function (Depressive Signs/Symptoms)  Outcome: Improving     Problem: Feelings of Worthlessness, Hopelessness or Excessive Guilt (Depressive Signs/Symptoms)  Goal: Enhanced Self-Esteem and Confidence (Depressive Signs/Symptoms)  Outcome: Improving     Problem: Mood Impairment (Depressive Signs/Symptoms)  Goal: Improved Mood Symptoms (Depressive Signs/Symptoms)  Outcome: Improving

## 2021-11-15 NOTE — PROGRESS NOTES
Pt appeared sleeping  for more than 6 hours this shift    Respirations are even and unlabored.    Safety checks completed throughout the night per protocol.     No discomfort or pain verbalized this shift    No harm/assault  to self and others noted or reported this shift.     No SI/HI or hallucination noted or reported this shift    Will continue to monitor and assist as needed.

## 2021-11-15 NOTE — PLAN OF CARE
Assessment/Intervention/Current Symtoms and Care Coordination  Writer met with Bibiana and consulted with psychiatry during team meeting. Bibiana received court documents, and wanted to dispute information in the pre petition screener's assessment. She doesn't think her connection with her Twin sister that is no longer living is a delusion, and she wants to continue to get legal assistance about her murder. She continued to state that she believes in the paranormal and ghosts.   Writer recommended she talk to her  in preparation for court. Bibiana has court preliminary hearing  tomorrow at 10:30am.  Staff will assist her in attending court through Zoom.      Financial  to verify insurance with Medicaid [place]. This will affect discharge planning.    [facility] CM called and collaborated on potential discharge plan. Waiting on medical insurance information to see if eligible for IRTS.        Discharge Plan or Goal  After symptom stabilization and response from courts regarding petition for civil commitment w/ dash in [facility], a comprehensive safe discharge plan will be developed.     Barriers to Discharge   Symptom stabilization, Medication Management, Care Coordination, court hold     Referral Status  [facility] : Cleo Nguyen [phone] [email or web address]    Interested in resources including applying for social security, finding a supportive living environment, and job assistance.       Legal Status  Court hold. [facility] Preliminary hearing Tuesday 11/16/2021 at 10:30AM. Psychiatrist recommend stay of commitment.

## 2021-11-15 NOTE — PROGRESS NOTES
Pt arrived late to group and worked quietly. Pt attended to detail and took initiative to start activity without demonstration from therapist. Pt brought her bible with her but did not read or utilize it during group time. Pt concentrated on tasks and appeared to be invested in finishing the activity.      11/15/21 1525   Engagement   Intervention Group   Topic Detail Fuse beads for cognitive skills, fine motor skills, visual perceptual, frustration tolerance, creativity, relaxation.   Attendance Attended   Patient Response Demonstrated understanding of materials provided;Was respectful;Positive attitude   Concentrated on Task 30 - 45 min   Cognition Goal-directed;Follows through with task;Attends to detail;Sequences task   Mood/Affect Pleasant;Content   Social/Behavioral Engaged;Motivated   Supervision   Supervision Direct supervision provided

## 2021-11-15 NOTE — PROGRESS NOTES
11/15/21 1113   Engagement   Intervention Group   Topic Detail Qi gong for gentle movement, emotional and physical wellness, healthy daily routine building, and coping with symptoms   Attendance Did not attend

## 2021-11-15 NOTE — PLAN OF CARE
Problem: Behavioral Health Plan of Care  Goal: Plan of Care Review  Outcome: Improving  Goal: Adheres to Safety Considerations for Self and Others  Intervention: Develop and Maintain Individualized Safety Plan  Recent Flowsheet Documentation  Taken 11/15/2021 0210 by Velia Hennessy RN  Safety Measures: safety rounds completed  Goal: Absence of New-Onset Illness or Injury  Intervention: Identify and Manage Fall Risk  Recent Flowsheet Documentation  Taken 11/15/2021 0210 by Velia Hennessy RN  Safety Measures: safety rounds completed     Problem: Sleep Disturbance  Goal: Adequate Sleep/Rest  Outcome: Improving     Problem: Activity and Energy Impairment (Anxiety Signs/Symptoms)  Goal: Optimized Energy Level (Anxiety Signs/Symptoms)  Outcome: Improving

## 2021-11-15 NOTE — PLAN OF CARE
Problem: Behavioral Health Plan of Care  Goal: Plan of Care Review  Outcome: Improving   Reviewed.    Problem: Behavioral Health Plan of Care  Goal: Optimized Coping Skills in Response to Life Stressors  Outcome: Improving   Bibiana is open to suggestions for managing stressors.    Bibiana has been in her room much of the day. She states the new medication at night leaves her feeling groggy in the morning. She is otherwise pleasant upon approach. Expressing her needs appropriately. She rates anxiety 4/10 and depression 2/10. She denies feeling SI/HI or having AH/VH. She has her menses and was given tylenol for cramps. New order obtained for ibuprofen. She acknowledged the need to come out of her room more this afternoon and evening.     Deirdre Venegas RN

## 2021-11-15 NOTE — PROGRESS NOTES
Court commitment documents were delivered by law enforcement at approximately 1150.  One copy handed by writer to Bibiana and one copy was placed in the chart. Deirdre Venegas RN
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
## 2021-11-16 PROCEDURE — 128N000001 HC R&B CD/MH ADULT

## 2021-11-16 PROCEDURE — 250N000013 HC RX MED GY IP 250 OP 250 PS 637: Performed by: NURSE PRACTITIONER

## 2021-11-16 PROCEDURE — 99231 SBSQ HOSP IP/OBS SF/LOW 25: CPT | Performed by: PSYCHIATRY & NEUROLOGY

## 2021-11-16 PROCEDURE — 250N000013 HC RX MED GY IP 250 OP 250 PS 637: Performed by: PSYCHIATRY & NEUROLOGY

## 2021-11-16 RX ADMIN — QUETIAPINE FUMARATE 600 MG: 300 TABLET ORAL at 20:10

## 2021-11-16 RX ADMIN — CHOLECALCIFEROL (VITAMIN D3) 10 MCG (400 UNIT) TABLET 10 MCG: at 08:52

## 2021-11-16 RX ADMIN — IBUPROFEN 400 MG: 200 TABLET, FILM COATED ORAL at 09:11

## 2021-11-16 ASSESSMENT — ACTIVITIES OF DAILY LIVING (ADL)
DRESS: INDEPENDENT
DRESS: INDEPENDENT
LAUNDRY: WITH SUPERVISION
LAUNDRY: WITH SUPERVISION
HYGIENE/GROOMING: INDEPENDENT
ORAL_HYGIENE: INDEPENDENT
ORAL_HYGIENE: INDEPENDENT
HYGIENE/GROOMING: INDEPENDENT

## 2021-11-16 ASSESSMENT — MIFFLIN-ST. JEOR: SCORE: 1645.83

## 2021-11-16 NOTE — PROVIDER NOTIFICATION
11/16/21 1300   Engagement   Intervention Group   Topic Concentration/problem solving;Recovery planning   Topic Detail   (Process Group: Stages of Change, relapse prevention)   Attendance Attended   Patient Response Demonstrated understanding of materials provided   Concentrated on Task 30 - 45 min   Cognition Goal-directed;Follows through with task   Mood/Affect Content   Social/Behavioral Engaged   Thought Content [place]     GROUP LENGTH (MINS):   30 mins.   RELEVANT PRIMARY DIAGNOSIS: Patient Active Problem List   Diagnosis     Psychosis (H)     Bipolar I disorder, current or most recent episode manic, with psychotic features (H)     Vaginal pain     PTSD (post-traumatic stress disorder)        TREATMENT MODALITY:      []CBT       []DBT       []ACT       []Interpersonal psychotherapy                                 [x]Psychoeducational therapy       [x]Skill development       []Other:        ATTENDANCE:        []Stayed for entire group     []Arrived late    [x] Left early       # OF PATIENTS IN GROUP: 6   BILLABLE:     []Yes            [x]No   Notes:

## 2021-11-16 NOTE — PROGRESS NOTES
11/16/21 1107   Engagement   Intervention Group   Topic Detail [place] and education provided on the mental health benefits of leisure for concentration/attention, follow through, delayed gratifiction, frustration tolerance, coping, symptom management, healthy leisure and socialization   Attendance Did not attend
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
## 2021-11-16 NOTE — PLAN OF CARE
Problem: Behavioral Health Plan of Care  Goal: Adheres to Safety Considerations for Self and Others  Intervention: Develop and Maintain Individualized Safety Plan  Recent Flowsheet Documentation  Taken 11/15/2021 1800 by Enedina Barrios RN  Safety Measures: environmental rounds completed     Problem: Behavioral Health Plan of Care  Goal: Develops/Participates in Therapeutic [place] to Support Successful Transition  Intervention: Foster Therapeutic [place]  Recent Flowsheet Documentation  Taken 11/15/2021 1800 by Enedina Barrios RN  Trust Relationship/Rapport:    care explained    emotional support provided    thoughts/feelings acknowledged     Problem: Activity and Energy Impairment (Anxiety Signs/Symptoms)  Goal: Optimized Energy Level (Anxiety Signs/Symptoms)  Outcome: Improving     Problem: Mood Impairment (Depressive Signs/Symptoms)  Goal: Improved Mood Symptoms (Depressive Signs/Symptoms)  Outcome: Improving   Patient is pleasant, calm, cooperative with cares. Polite and grateful in her conversations. Not engaged with peers. Minimally engaged with writer.  Spent majority of this shift in the comfort room playing music. Came out only for meals. Denies pain and all psych sx. At bedtime, she asked for tylenol for general comfort. Patient is compliant with medication. Contracts for safety. No behaviors noted.

## 2021-11-16 NOTE — PROGRESS NOTES
Pt rated anxiety at 4-5/10 and denied all other psych sx and pain.  Pt used her one time computer privilege today to access information on restraining orders and Seroquel side effects, noted in treatment team summary. Pt served with commitment paperwork, received information without incident.  Court exam scheduled 11/22/21 with trial on 11/24/21.  Pt attended community meeting and was encouraged to participate in groups throughout the day.

## 2021-11-16 NOTE — PROGRESS NOTES
"   11/16/21 1500   Engagement   Intervention Group   Topic Detail OT: Education regarding gratitude and hands-on task (decoupage boxes) to promote self-awareness, coping skills, communication, expressing thoughts/feelings, direction-following, and attention/sequencing.    Attendance Attended   Patient Response Demonstrated understanding of materials provided;Was respectful;Prosocial behavior   Concentrated on Task duration of group  (left briefly to meet with SW, returned on own)   Cognition Goal-directed   Mood/Affect Content;Pleasant   Social/Behavioral Engaged   Thought Content Insightful   Goals addressed in session today Pt identified during check-in as being grateful for dancing as it is \"celebrating life.\" Pt reading Bible during education/check-in though participates actively in hands-on task. Increased time to make decisions but otherwise work skills are good with ability to sequence task and attend to details. Prosocial behavior.      "

## 2021-11-16 NOTE — PLAN OF CARE
"Assessment/Intervention/Current Symtoms and Care Coordination  Writer met with Bibiana today. [facility]. During group that writer facilitated, she seemed engaged in listening to a Tedtalk, but left early from group. She reports that the court hearing she attended today went \"fine\". Writer explained that it may take a day or so to get a response from the court. Writer explained that her insurance is inactive, and she agreed to speaking with the financial . Financial  will call the unit to assist with applying for medical insurance. No health insurance will affect discharge planning.     Discharge Plan or Goal  After symptom stabilization and response from courts regarding petition for civil commitment w/ dash in [facility], a comprehensive safe discharge plan will be developed.      Barriers to Discharge   Symptom stabilization, Medication Management, Care Coordination, court hold     Referral Status  [facility] : Cleo Nguyen [phone] [email or web address]     Interested in resources including applying for social security, finding a supportive living environment, and job assistance.       Legal Status  Court hold. [facility] Preliminary hearing Tuesday 11/16/2021 at 10:30AM. Psychiatrist recommend stay of commitment  "

## 2021-11-17 LAB
HCG UR QL: NEGATIVE
SARS-COV-2 RNA RESP QL NAA+PROBE: NEGATIVE

## 2021-11-17 PROCEDURE — 90686 IIV4 VACC NO PRSV 0.5 ML IM: CPT | Performed by: NURSE PRACTITIONER

## 2021-11-17 PROCEDURE — 250N000013 HC RX MED GY IP 250 OP 250 PS 637: Performed by: PSYCHIATRY & NEUROLOGY

## 2021-11-17 PROCEDURE — 81025 URINE PREGNANCY TEST: CPT | Performed by: PSYCHIATRY & NEUROLOGY

## 2021-11-17 PROCEDURE — 128N000001 HC R&B CD/MH ADULT

## 2021-11-17 PROCEDURE — 87635 SARS-COV-2 COVID-19 AMP PRB: CPT | Performed by: PSYCHIATRY & NEUROLOGY

## 2021-11-17 PROCEDURE — 99231 SBSQ HOSP IP/OBS SF/LOW 25: CPT | Performed by: PSYCHIATRY & NEUROLOGY

## 2021-11-17 PROCEDURE — 250N000013 HC RX MED GY IP 250 OP 250 PS 637: Performed by: NURSE PRACTITIONER

## 2021-11-17 PROCEDURE — 250N000011 HC RX IP 250 OP 636: Performed by: NURSE PRACTITIONER

## 2021-11-17 PROCEDURE — G0008 ADMIN INFLUENZA VIRUS VAC: HCPCS | Performed by: NURSE PRACTITIONER

## 2021-11-17 RX ADMIN — QUETIAPINE FUMARATE 600 MG: 300 TABLET ORAL at 20:27

## 2021-11-17 RX ADMIN — INFLUENZA A VIRUS A/VICTORIA/2570/2019 IVR-215 (H1N1) ANTIGEN (FORMALDEHYDE INACTIVATED), INFLUENZA A VIRUS A/TASMANIA/503/2020 IVR-221 (H3N2) ANTIGEN (FORMALDEHYDE INACTIVATED), INFLUENZA B VIRUS B/PHUKET/3073/2013 ANTIGEN (FORMALDEHYDE INACTIVATED), AND INFLUENZA B VIRUS B/WASHINGTON/02/2019 ANTIGEN (FORMALDEHYDE INACTIVATED) 0.5 ML: 15; 15; 15; 15 INJECTION, SUSPENSION INTRAMUSCULAR at 15:47

## 2021-11-17 RX ADMIN — CHOLECALCIFEROL (VITAMIN D3) 10 MCG (400 UNIT) TABLET 10 MCG: at 08:27

## 2021-11-17 NOTE — PLAN OF CARE
Problem: Behavioral Health Plan of Care  Goal: Absence of New-Onset Illness or Injury  Outcome: Improving  Goal: Optimized Coping Skills in Response to Life Stressors  Outcome: Improving  Goal: Develops/Participates in Therapeutic [place] to Support Successful Transition  Outcome: Improving     Problem: Sleep Disturbance  Goal: Adequate Sleep/Rest  Outcome: Improving     Problem: Activity and Energy Impairment (Anxiety Signs/Symptoms)  Goal: Optimized Energy Level (Anxiety Signs/Symptoms)  Outcome: Improving     Problem: Cognitive Impairment (Anxiety Signs/Symptoms)  Goal: Optimized Cognitive Function (Anxiety Signs/Symptoms)  Outcome: Improving     Problem: Mood Impairment (Anxiety Signs/Symptoms)  Goal: Improved Mood Symptoms (Anxiety Signs/Symptoms)  Outcome: Improving     Problem: Activity and Energy Impairment (Depressive Signs/Symptoms)  Goal: Optimized Energy Level (Depressive Signs/Symptoms)  Outcome: Improving     Problem: Cognitive Impairment (Depressive Signs/Symptoms)  Goal: Optimized Cognitive Function (Depressive Signs/Symptoms)  Outcome: Improving     Problem: Feelings of Worthlessness, Hopelessness or Excessive Guilt (Depressive Signs/Symptoms)  Goal: Enhanced Self-Esteem and Confidence (Depressive Signs/Symptoms)  Outcome: Improving     Problem: Mood Impairment (Depressive Signs/Symptoms)  Goal: Improved Mood Symptoms (Depressive Signs/Symptoms)  Outcome: Improving     Pt resting quietly in room reading book when writer came onto shift. Later came out onto unit and was social with peers and had snack. Voices no c/o pain. Safety checks completed q 15 minutes per unit policy. Patient was given a hat for the toilet to collect a urine HCG.     Cammie Jones RN

## 2021-11-17 NOTE — PLAN OF CARE
Problem: Behavioral Health Plan of Care  Goal: Plan of Care Review  Outcome: Improving  Goal: Patient-Specific Goal (Individualization)  Outcome: Improving  Note:     Goal: Adheres to Safety Considerations for Self and Others  Outcome: Improving  Goal: Absence of New-Onset Illness or Injury  Outcome: Improving  Goal: Optimized Coping Skills in Response to Life Stressors  Outcome: Improving  Goal: Develops/Participates in Therapeutic [place] to Support Successful Transition  Outcome: Improving     Problem: Sleep Disturbance  Goal: Adequate Sleep/Rest  Outcome: Improving     Problem: Activity and Energy Impairment (Anxiety Signs/Symptoms)  Goal: Optimized Energy Level (Anxiety Signs/Symptoms)  Outcome: Improving     Problem: Mood Impairment (Anxiety Signs/Symptoms)  Goal: Improved Mood Symptoms (Anxiety Signs/Symptoms)  Outcome: Improving     Problem: Activity and Energy Impairment (Depressive Signs/Symptoms)  Goal: Optimized Energy Level (Depressive Signs/Symptoms)  Outcome: Improving     Problem: Cognitive Impairment (Depressive Signs/Symptoms)  Goal: Optimized Cognitive Function (Depressive Signs/Symptoms)  Outcome: Improving     Problem: Mood Impairment (Depressive Signs/Symptoms)  Goal: Improved Mood Symptoms (Depressive Signs/Symptoms)  Outcome: Improving       Pt out for breakfast, looked sad, didn't want to talk. She did take her meds and ate breakfast. We went back to bed and then got up around 11;30. She said she was just thinking too much about some stuff this am. She now is bright, denies all psych symptoms, has been out in [place], social, attending groups. She had her covid swab done.

## 2021-11-17 NOTE — PROGRESS NOTES
11/17/21 1500   Engagement   Intervention Group   Topic Detail [employer] Day 2 for creative expression and decision making   Attendance Attended   Patient Response Needs reinforcement/repetition to learn materials;Was respectful;Positive attitude   Concentrated on Task 30 - 45 min   Cognition Goal-directed;Initiates task   Mood/Affect Content;Pleasant   Social/Behavioral Engaged;Motivated   Thought Content Other (comment)  (Superficial )   Goals addressed in session today Pt arrived late to group though stayed for duration and completed activity from yesterday. Pt benefits from cueing/min assist for planning, otherwise able to work independently. She is superficial in conversation and keeps to self, does not initiate conversation with staff/peers, listening to headphones during group. Polite in interactions when approached.
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
## 2021-11-17 NOTE — PLAN OF CARE
Assessment/Intervention/Current Symtoms and Care Coordination  Writer met with the patient to introduce self as coverage  and to check in. Patient presented as content, calm and cooperative. She did not express or vocalize any delusions and did not talk about her preoccupation with Yazidi during this assessment. She is aware of her upcoming court dates next week. She informed writer that she met with financial [place], today and completed an MA application. Writer and patient discussed IRTS as a possibility for her discharge plan, she stated she has been to an IRTS in [place] before and felt that she learned a lot from the program. Writer received message from patient's , Laura Benítez, regarding discharge planning for patient; called back and had to leave a message.      Discharge Plan or Goal  After symptom stabilization and response from courts regarding petition for civil commitment w/ dash in [facility], a comprehensive safe discharge plan will be developed.      Barriers to Discharge   Symptom stabilization, Medication Management, Care Coordination, court hold     Referral Status  [facility] : Cleo Nguyen [phone] [email or web address]     Interested in resources including applying for social security, finding a supportive living environment, and job assistance.       Legal Status  Court hold. [facility] court scheduled 11/22 and 11/24. Psychiatrist recommend stay of commitment    Kady Day [facility], 11/17/2021, 1:11 PM

## 2021-11-17 NOTE — PLAN OF CARE
Problem: Behavioral Health Plan of Care  Goal: Absence of New-Onset Illness or Injury  Outcome: Improving  Goal: Optimized Coping Skills in Response to Life Stressors  11/17/2021 0017 by Cammie Jones, RN  Outcome: Improving  Goal: Develops/Participates in Therapeutic [place] to Support Successful Transition  11/17/2021 0017 by Cammie Jones, RN  Outcome: Improving     Pt voices no c/o pain. Urine HCG lab collected. Negative result. Pt slept greater than 7 hours. Safety checks completed q 15 minutes per unit policy.  Cammie Jones RN

## 2021-11-18 PROCEDURE — 250N000013 HC RX MED GY IP 250 OP 250 PS 637: Performed by: PSYCHIATRY & NEUROLOGY

## 2021-11-18 PROCEDURE — 128N000001 HC R&B CD/MH ADULT

## 2021-11-18 PROCEDURE — 250N000013 HC RX MED GY IP 250 OP 250 PS 637: Performed by: NURSE PRACTITIONER

## 2021-11-18 PROCEDURE — 99232 SBSQ HOSP IP/OBS MODERATE 35: CPT | Performed by: PSYCHIATRY & NEUROLOGY

## 2021-11-18 PROCEDURE — 90853 GROUP PSYCHOTHERAPY: CPT

## 2021-11-18 RX ORDER — DIVALPROEX SODIUM 250 MG/1
500 TABLET, EXTENDED RELEASE ORAL DAILY
Status: DISCONTINUED | OUTPATIENT
Start: 2021-11-18 | End: 2021-11-22

## 2021-11-18 RX ADMIN — QUETIAPINE FUMARATE 600 MG: 300 TABLET ORAL at 20:00

## 2021-11-18 RX ADMIN — CHOLECALCIFEROL (VITAMIN D3) 10 MCG (400 UNIT) TABLET 10 MCG: at 10:04

## 2021-11-18 RX ADMIN — DIVALPROEX SODIUM 500 MG: 250 TABLET, FILM COATED, EXTENDED RELEASE ORAL at 10:04

## 2021-11-18 ASSESSMENT — ACTIVITIES OF DAILY LIVING (ADL): HYGIENE/GROOMING: INDEPENDENT

## 2021-11-18 NOTE — PROGRESS NOTES
11/18/21 1200   Engagement   Intervention Group   Topic Detail Process group: Goals   Attendance Attended   Patient Response Demonstrated understanding of materials provided;Was respectful   Concentrated on Task duration of group   Cognition Follows through with task   Mood/Affect Content   Social/Behavioral Cooperative   Thought Content Reality oriented  (to group topic)     GROUP LENGTH (MINS):   30   RELEVANT PRIMARY DIAGNOSIS: Patient Active Problem List   Diagnosis     Psychosis (H)     Bipolar I disorder, current or most recent episode manic, with psychotic features (H)     Vaginal pain     PTSD (post-traumatic stress disorder)        TREATMENT MODALITY:      []CBT       []DBT       []ACT       []Interpersonal psychotherapy                                 [x]Psychoeducational therapy       [x]Skill development       []Other:        ATTENDANCE:        []Stayed for entire group     [x]Arrived late    [] Left early       # OF PATIENTS IN GROUP: 7   BILLABLE:     [x]Yes            []No   Notes:

## 2021-11-18 NOTE — PLAN OF CARE
Problem: Behavioral Health Plan of Care  Goal: Plan of Care Review  Outcome: Improving     Problem: Sleep Disturbance  Goal: Adequate Sleep/Rest  Outcome: Improving     Problem: Activity and Energy Impairment (Depressive Signs/Symptoms)  Goal: Optimized Energy Level (Depressive Signs/Symptoms)  Outcome: Improving  Bibiana was up on the unit, pleasant on approach with a bright affect. She stated that she slept well and denied pain or discomfort. She denied thoughts of self harm or harm toward others and she contracted for safety. She was medication compliant and she denied side effects. She had adequate intake and she attended groups.
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
## 2021-11-18 NOTE — PLAN OF CARE
"Assessment/Intervention/Current Symptoms and Care Coordination  Writer spoke with patient's , Laura Benítez, to check in regarding patient. She stated that she has a case aid who is able to expedite [employer] applications, so patient's application for health insurance should be processed quickly. Writer explained that patient will be referred to IRTS, which  was in agreement with.  Writer met with the patient to check in and consulted with MD in treatment team meeting. Patient denied AH, VH, SI and HI. She stated, \"I'm happy, my mood is really good\". She expressed gratitude about being in the hospital and getting resources. She informed writer she is in the process of filing an OFP against her parents and is currently filling out the paperwork. Writer talked with patient about going to an IRTS, which she remains in agreement with. She noted her preference would be in [place]; signed ALEJANDRA for [facility] and writer submitted referrals to [facility] and [facility].     Discharge Plan or Goal  After symptom stabilization and response from courts regarding petition for civil commitment w/ dash in [facility], a comprehensive safe discharge plan will be developed.      Barriers to Discharge   Symptom stabilization, Medication Management, Care Coordination, court hold     Referral Status  Referred to [facility] on 11/18  [facility] : Laura Benítez [phone] [email or web address]     Interested in resources including applying for social security, finding a supportive living environment, and job assistance.       Legal Status  Court hold. [facility] court scheduled 11/22 and 11/24. Psychiatrist recommend stay of commitment    Kady Day [facility], 11/18/2021, 1:11 PM    "

## 2021-11-18 NOTE — PROGRESS NOTES
11/18/21 1400   Engagement   Intervention Group   Topic Detail OT: Suncatcher painting to promote creative expression, planning, problem-solving, improved frustration tolerance, concentration, and coping through distraction   Attendance Attended   Patient Response Was respectful;Positive attitude;Demonstrated understanding of materials provided   Concentrated on Task duration of group   Cognition Goal-directed;Takes initiative   Mood/Affect Bright;Pleasant   Social/Behavioral Engaged   Thought Content Reality oriented   Goals addressed in session today Pt participated actively, demonstrates good frustation tolerance when spilling paints on self or making errors with activity. Bright affect. Responds when initiated by staff/peers otherwise keeps to self.

## 2021-11-18 NOTE — PROGRESS NOTES
11/18/21 1037   Engagement   Intervention Group   Topic Detail OT: Activities for intellectual wellness (Tappel and Garbage), new learning, categorization, socialization and healthy leisure engagement   Attendance Did not attend   Reason for Not Attending   (politely declined, wanting to work on writing which had been her goal for the day)

## 2021-11-18 NOTE — PLAN OF CARE
Problem: Behavioral Health Plan of Care  Goal: Plan of Care Review  Outcome: Improving  Goal: Patient-Specific Goal (Individualization)  Outcome: Improving     Problem: Sleep Disturbance  Goal: Adequate Sleep/Rest  Outcome: Improving     Problem: Behavioral Health Plan of Care  Goal: Plan of Care Review  Outcome: Improving  Goal: Patient-Specific Goal (Individualization)  Outcome: Improving     Problem: Sleep Disturbance  Goal: Adequate Sleep/Rest  Outcome: Improving   No self harming behavior noted,  safety checks done and pt on suicide precautions. Sleeping uneventful throughout the night. Will continue to monitor.

## 2021-11-18 NOTE — PLAN OF CARE
Problem: Behavioral Health Plan of Care  Goal: Patient-Specific Goal (Individualization)  Outcome: Improving  Flowsheets  Taken 11/18/2021 1020 by Kady Day  Patient-Specific Goals (Include Timeframe):   Patient will be referred to IRTS programs   most likely will be on a stay of commitment  Taken 11/13/2021 1652 by Awilda Bentley, RN  Patient Vulnerabilities: lacks insight into illness  Patient Personal Strengths:   expressive of emotions   expressive of needs   independent living skills   intellectual cognitive skills   positive attitude   self-awareness       BEHAVIORAL TEAM DISCUSSION    Participants: Nurse: CLAUDETTE, Social Wk: REBECA, Provider: NICKO Sherman, OT: AF, Pharm: MH  Progress: Improving  Anticipated length of stay: Next week  Continued Stay Criteria/Rationale: symptom stabilization, medication management, care coordination, civil commitment process  Medical/Physical: no significant events, Seen by Hospitalist 11/10 for vaginal pain  Precautions:   Behavioral Orders   Procedures    Code 1 - Restrict to Unit    Routine Programming     As clinically indicated    Status 15     Every 15 minutes.     Plan: Discharge to IRTS facility

## 2021-11-19 PROCEDURE — 128N000001 HC R&B CD/MH ADULT

## 2021-11-19 PROCEDURE — 99231 SBSQ HOSP IP/OBS SF/LOW 25: CPT | Performed by: PSYCHIATRY & NEUROLOGY

## 2021-11-19 PROCEDURE — 250N000013 HC RX MED GY IP 250 OP 250 PS 637: Performed by: PSYCHIATRY & NEUROLOGY

## 2021-11-19 PROCEDURE — 250N000013 HC RX MED GY IP 250 OP 250 PS 637: Performed by: NURSE PRACTITIONER

## 2021-11-19 RX ADMIN — DIVALPROEX SODIUM 500 MG: 250 TABLET, FILM COATED, EXTENDED RELEASE ORAL at 08:40

## 2021-11-19 RX ADMIN — CHOLECALCIFEROL (VITAMIN D3) 10 MCG (400 UNIT) TABLET 10 MCG: at 08:40

## 2021-11-19 RX ADMIN — QUETIAPINE FUMARATE 600 MG: 300 TABLET ORAL at 20:41

## 2021-11-19 ASSESSMENT — ACTIVITIES OF DAILY LIVING (ADL)
ORAL_HYGIENE: INDEPENDENT
HYGIENE/GROOMING: INDEPENDENT
DRESS: INDEPENDENT
ORAL_HYGIENE: INDEPENDENT
LAUNDRY: WITH SUPERVISION
HYGIENE/GROOMING: INDEPENDENT
DRESS: INDEPENDENT
LAUNDRY: WITH SUPERVISION

## 2021-11-19 NOTE — PLAN OF CARE
Goal review completed:     Problem: Relapse Prevention  Goal: Engage in OT Group  Description: Engage in OT group activities that support recovery  Outcome: Improving     Patient has engaged in at least 1 OT group daily for 6 out of 8 days. Therapist met in person with patient and she expressed interest in OT groups with a creative component for healthy leisure and coping with symptoms through distraction. Patient shared she initially did not want to be admitted, however recognizes she is returning to the community with more knowledge and resources than before.    Care plan goals have been reviewed and are appropriate. Continue to establish rapport and encourage group participation with focus on goal area/s for further progress. Continue to correspond with interdisciplinary team regarding ongoing treatment plan, potential changes in patient's status, and discharge planning.
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
## 2021-11-19 NOTE — PLAN OF CARE
Problem: Sleep Disturbance  Goal: Adequate Sleep/Rest  Outcome: Improving   Patient slept throughout the night,safety check done per protocol and no safety concerns noted.
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
## 2021-11-19 NOTE — PROGRESS NOTES
11/19/21 1507   Engagement   Intervention Group   Topic Detail Creative endeavor for coping with symptoms through distraction, healthy leisure, attention to detail, planning, and organizing. (wooden word painting).   Attendance Attended   Patient Response Demonstrated understanding of materials provided   Concentrated on Task duration of group   Cognition Goal-directed;Attends to detail   Mood/Affect Bright;Congruent;Content;Pleasant   Social/Behavioral Engaged;Cooperative   Thought Content Reality oriented   Goals addressed in session today Patient worked independently in a goal directed manner. She demosntrated good attention to detail and expressed satisfaction in her work.

## 2021-11-19 NOTE — PLAN OF CARE
Problem: Activity and Energy Impairment (Anxiety Signs/Symptoms)  Goal: Optimized Energy Level (Anxiety Signs/Symptoms)  Outcome: Improving  Intervention: Optimize Energy Level  Recent Flowsheet Documentation  Taken 11/19/2021 0852 by Mariya Rizzo RN  Diversional Activity: television     Problem: Cognitive Impairment (Anxiety Signs/Symptoms)  Goal: Optimized Cognitive Function (Anxiety Signs/Symptoms)  Outcome: Improving     Problem: Activity and Energy Impairment (Depressive Signs/Symptoms)  Goal: Optimized Energy Level (Depressive Signs/Symptoms)  Outcome: Improving  Intervention: Optimize Energy Level  Recent Flowsheet Documentation  Taken 11/19/2021 0852 by Mariya Rizzo RN  Diversional Activity: television.  Patient calm and pleasant on approach. Visible and engaged with selected peers, denied pain, anxiety, depression, SIB visual and auditory hallucinations. Ate well both meals with adequate  fluids intake. No behaviors observed. Contracted for safety.  Pateint expressed that she feels her face is breaking out and thinks possible due to medications. No rash noted on face at this time.  Will continue plan of care.

## 2021-11-19 NOTE — PROGRESS NOTES
11/19/21 1103   Engagement   Intervention Group   Topic Detail Wellness group for physical wellness, pain management, social wellness, and coping with symptoms through distraction.   Attendance Did not attend   Reason for Not Attending Refused

## 2021-11-20 PROCEDURE — 250N000013 HC RX MED GY IP 250 OP 250 PS 637: Performed by: PSYCHIATRY & NEUROLOGY

## 2021-11-20 PROCEDURE — 250N000013 HC RX MED GY IP 250 OP 250 PS 637: Performed by: NURSE PRACTITIONER

## 2021-11-20 PROCEDURE — 128N000001 HC R&B CD/MH ADULT

## 2021-11-20 RX ADMIN — CHOLECALCIFEROL (VITAMIN D3) 10 MCG (400 UNIT) TABLET 10 MCG: at 08:18

## 2021-11-20 RX ADMIN — QUETIAPINE FUMARATE 600 MG: 300 TABLET ORAL at 20:49

## 2021-11-20 RX ADMIN — DIVALPROEX SODIUM 500 MG: 250 TABLET, FILM COATED, EXTENDED RELEASE ORAL at 08:18

## 2021-11-20 ASSESSMENT — ACTIVITIES OF DAILY LIVING (ADL)
ORAL_HYGIENE: INDEPENDENT
DRESS: INDEPENDENT
DRESS: INDEPENDENT
HYGIENE/GROOMING: INDEPENDENT
HYGIENE/GROOMING: INDEPENDENT
LAUNDRY: WITH SUPERVISION
ORAL_HYGIENE: INDEPENDENT
LAUNDRY: WITH SUPERVISION

## 2021-11-20 ASSESSMENT — MIFFLIN-ST. JEOR: SCORE: 1637.66

## 2021-11-20 NOTE — PLAN OF CARE
Problem: Behavioral Health Plan of Care  Goal: Develops/Participates in Therapeutic [place] to Support Successful Transition  Outcome: Improving     Problem: Sleep Disturbance  Goal: Adequate Sleep/Rest  Outcome: Improving     Problem: Behavioral Health Plan of Care  Goal: Optimized Coping Skills in Response to Life Stressors  Outcome: Improving    Pt has been pleasant, spent time in comfort room, otherwise was isolative to her room, denied SI/HI/SIB, psychosis, depression, or anxiety. Reported pain 2/10, denied PRN's. Pt remained in bed all night.

## 2021-11-20 NOTE — PLAN OF CARE
Problem: Activity and Energy Impairment (Anxiety Signs/Symptoms)  Goal: Optimized Energy Level (Anxiety Signs/Symptoms)  Outcome: Improving     Problem: Activity and Energy Impairment (Depressive Signs/Symptoms)  Goal: Optimized Energy Level (Depressive Signs/Symptoms)  Outcome: Improving     Problem: Mood Impairment (Depressive Signs/Symptoms)  Goal: Improved Mood Symptoms (Depressive Signs/Symptoms)  Outcome: Improving.  Patient visible and engaged with selected peers on the unit,  sat with writer and expressed she hopes to get discharge to people who really cares about her. Writer assured her  will make sure she goes to a safe place. Patient  denied pain, anxiety, depression,SIB visual and auditory hallucinations. Ate well both meals with adequate  fluids intake. No behaviors observed. Contracted for safety.

## 2021-11-20 NOTE — PROGRESS NOTES
11/20/21 1019   Engagement   Intervention Group   Topic Detail Left center right activity for direction following, attention, socialization, healthy distraction/leisure, and coping with symptoms.   Attendance Did not attend   Supervision   Supervision Direct supervision provided

## 2021-11-21 PROCEDURE — 128N000001 HC R&B CD/MH ADULT

## 2021-11-21 PROCEDURE — 250N000013 HC RX MED GY IP 250 OP 250 PS 637: Performed by: PSYCHIATRY & NEUROLOGY

## 2021-11-21 PROCEDURE — 250N000013 HC RX MED GY IP 250 OP 250 PS 637: Performed by: NURSE PRACTITIONER

## 2021-11-21 RX ADMIN — TRAZODONE HYDROCHLORIDE 50 MG: 50 TABLET ORAL at 20:33

## 2021-11-21 RX ADMIN — GABAPENTIN 100 MG: 100 CAPSULE ORAL at 20:34

## 2021-11-21 RX ADMIN — QUETIAPINE FUMARATE 600 MG: 300 TABLET ORAL at 20:33

## 2021-11-21 RX ADMIN — CHOLECALCIFEROL (VITAMIN D3) 10 MCG (400 UNIT) TABLET 10 MCG: at 08:22

## 2021-11-21 RX ADMIN — DIVALPROEX SODIUM 500 MG: 250 TABLET, FILM COATED, EXTENDED RELEASE ORAL at 08:22

## 2021-11-21 ASSESSMENT — ACTIVITIES OF DAILY LIVING (ADL)
HYGIENE/GROOMING: INDEPENDENT
HYGIENE/GROOMING: INDEPENDENT
ORAL_HYGIENE: INDEPENDENT
DRESS: INDEPENDENT
LAUNDRY: WITH SUPERVISION
ORAL_HYGIENE: INDEPENDENT
DRESS: INDEPENDENT
LAUNDRY: WITH SUPERVISION

## 2021-11-21 NOTE — PLAN OF CARE
Problem: Activity and Energy Impairment (Anxiety Signs/Symptoms)  Goal: Optimized Energy Level (Anxiety Signs/Symptoms)  Outcome: Improving     Problem: Mood Impairment (Anxiety Signs/Symptoms)  Goal: Improved Mood Symptoms (Anxiety Signs/Symptoms)  Outcome: Improving     Problem: Feelings of Worthlessness, Hopelessness or Excessive Guilt (Depressive Signs/Symptoms)  Goal: Enhanced Self-Esteem and Confidence (Depressive Signs/Symptoms)  Outcome: ImprovinPatient calm all shift. Requested to use computer, no orders on file.  Patient was advised to ask MD for approval tomorrow morning. Intermittent out in the [facility] area use phone few times this shift  Denied pain, anxiety, depression,SIB visual and auditory hallucinations. Ate well both meals with adequate  fluids intake. No behaviors observed. Contracted for safety.     g

## 2021-11-21 NOTE — PLAN OF CARE
Problem: Mood Impairment (Depressive Signs/Symptoms)  Goal: Improved Mood Symptoms (Depressive Signs/Symptoms)  Outcome: No Change     Problem: Feelings of Worthlessness, Hopelessness or Excessive Guilt (Depressive Signs/Symptoms)  Goal: Enhanced Self-Esteem and Confidence (Depressive Signs/Symptoms)  Outcome: No Change     Problem: Feelings of Worthlessness, Hopelessness or Excessive Guilt (Depressive Signs/Symptoms)  Goal: Enhanced Self-Esteem and Confidence (Depressive Signs/Symptoms)  Outcome: No Change     Problem: Mood Impairment (Anxiety Signs/Symptoms)  Goal: Improved Mood Symptoms (Anxiety Signs/Symptoms)  Outcome: No Change    Pt has been up this evening, spent time in the comfort room isolative from peers filling out paperwork. Pain 0, Anxiety 4, Depression 10/10. Contracted for safety. Denied SI/SIB. Medication compliant.  Remained in bed all NOC.

## 2021-11-21 NOTE — PROGRESS NOTES
11/21/21 1006   Engagement   Intervention Group   Topic Detail OT: simple craft for creative expression, concentration, coping with symptoms, and relaxation   Attendance Attended   Patient Response Demonstrated understanding of materials provided;Was respectful;Expressed feelings/issues;Positive attitude   Concentrated on Task duration of group   Cognition Goal-directed;Follows through with task   Mood/Affect Pleasant   Social/Behavioral Cooperative;Engaged

## 2021-11-22 PROCEDURE — 250N000013 HC RX MED GY IP 250 OP 250 PS 637: Performed by: NURSE PRACTITIONER

## 2021-11-22 PROCEDURE — 250N000013 HC RX MED GY IP 250 OP 250 PS 637: Performed by: PSYCHIATRY & NEUROLOGY

## 2021-11-22 PROCEDURE — 128N000001 HC R&B CD/MH ADULT

## 2021-11-22 PROCEDURE — 99231 SBSQ HOSP IP/OBS SF/LOW 25: CPT | Performed by: PSYCHIATRY & NEUROLOGY

## 2021-11-22 RX ORDER — DIVALPROEX SODIUM 500 MG/1
500 TABLET, EXTENDED RELEASE ORAL 2 TIMES DAILY
Status: DISCONTINUED | OUTPATIENT
Start: 2021-11-22 | End: 2021-12-03 | Stop reason: HOSPADM

## 2021-11-22 RX ADMIN — QUETIAPINE FUMARATE 600 MG: 300 TABLET ORAL at 20:39

## 2021-11-22 RX ADMIN — GABAPENTIN 100 MG: 100 CAPSULE ORAL at 08:36

## 2021-11-22 RX ADMIN — CHOLECALCIFEROL (VITAMIN D3) 10 MCG (400 UNIT) TABLET 10 MCG: at 08:30

## 2021-11-22 RX ADMIN — DIVALPROEX SODIUM 500 MG: 250 TABLET, FILM COATED, EXTENDED RELEASE ORAL at 20:39

## 2021-11-22 RX ADMIN — DIVALPROEX SODIUM 500 MG: 250 TABLET, FILM COATED, EXTENDED RELEASE ORAL at 08:29

## 2021-11-22 ASSESSMENT — ACTIVITIES OF DAILY LIVING (ADL)
DRESS: INDEPENDENT
HYGIENE/GROOMING: INDEPENDENT
ORAL_HYGIENE: INDEPENDENT
DRESS: STREET CLOTHES
LAUNDRY: WITH SUPERVISION
ORAL_HYGIENE: INDEPENDENT
HYGIENE/GROOMING: INDEPENDENT
LAUNDRY: WITH SUPERVISION
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
## 2021-11-22 NOTE — PROGRESS NOTES
Pt was in bed upon group invite.  Pt politely declined to join.       11/22/21 1615   Engagement   Intervention Group   Attendance Did not attend   Reason for Not Attending Refused
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
## 2021-11-22 NOTE — PROGRESS NOTES
11/22/21 1018   Engagement   Intervention Group   Topic Detail OT: Theraband exercise for physical and emotional wellness, healthy routine building, promotion of self-worth and self-esteem, and symptom management   Attendance Did not attend
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
## 2021-11-22 NOTE — PLAN OF CARE
BEHAVIORAL TEAM DISCUSSION    Participants: Nurse: Katrina WALL, Social Wk: David ROBB, Provider: Not present, OT: APPLE, Pharm: Emanuel ORDONEZ  Anticipated length of stay:  Approx 3-5 days.  Continued Stay Criteria/Rationale: Symptom Stabilization, Medication Management, Care coordination, Acceptance to IRTS  Medical/Physical: No significant events  Precautions:   Behavioral Orders   Procedures    Code 1 - Restrict to Unit    Routine Programming     As clinically indicated    Status 15     Every 15 minutes.     Plan: Once psychiatrically stabilized, discharge to IRTS. Approx 3-5 days.  Rationale for change in precautions or plan:

## 2021-11-23 PROCEDURE — 250N000013 HC RX MED GY IP 250 OP 250 PS 637: Performed by: NURSE PRACTITIONER

## 2021-11-23 PROCEDURE — 250N000013 HC RX MED GY IP 250 OP 250 PS 637: Performed by: PSYCHIATRY & NEUROLOGY

## 2021-11-23 PROCEDURE — 99231 SBSQ HOSP IP/OBS SF/LOW 25: CPT | Performed by: PSYCHIATRY & NEUROLOGY

## 2021-11-23 PROCEDURE — 128N000001 HC R&B CD/MH ADULT

## 2021-11-23 RX ADMIN — DIVALPROEX SODIUM 500 MG: 250 TABLET, FILM COATED, EXTENDED RELEASE ORAL at 08:30

## 2021-11-23 RX ADMIN — GABAPENTIN 100 MG: 100 CAPSULE ORAL at 16:37

## 2021-11-23 RX ADMIN — DIVALPROEX SODIUM 500 MG: 250 TABLET, FILM COATED, EXTENDED RELEASE ORAL at 20:29

## 2021-11-23 RX ADMIN — CHOLECALCIFEROL (VITAMIN D3) 10 MCG (400 UNIT) TABLET 10 MCG: at 08:30

## 2021-11-23 RX ADMIN — QUETIAPINE FUMARATE 600 MG: 300 TABLET ORAL at 20:29

## 2021-11-23 ASSESSMENT — ACTIVITIES OF DAILY LIVING (ADL)
HYGIENE/GROOMING: INDEPENDENT
DRESS: INDEPENDENT
ORAL_HYGIENE: INDEPENDENT
LAUNDRY: WITH SUPERVISION

## 2021-11-23 NOTE — PROGRESS NOTES
Patient was received to the unit at 1500 from Unit 4500.  Patient is alert and oriented x4. Patient appears to be in good spirits. Oriented to the unit and room.

## 2021-11-23 NOTE — PLAN OF CARE
Problem: Activity and Energy Impairment (Anxiety Signs/Symptoms)  Goal: Optimized Energy Level (Anxiety Signs/Symptoms)  Outcome: Improving     Problem: Activity and Energy Impairment (Depressive Signs/Symptoms)  Goal: Optimized Energy Level (Depressive Signs/Symptoms)  Outcome: Improving  Patient  has been isolative in room except for meals. When encouraged to come out, pt expressed she prefers to stay in room due to safety. Staff continue to offer reassurance. Rated anxiety 9/10,depression 9/10 and denies other psych symptom. Patient was offered prn anxiety pain, pt declined. Pt will be transferred to [facility], waiting on orders.
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
## 2021-11-23 NOTE — PLAN OF CARE
Problem: Behavioral Health Plan of Care  Goal: Adheres to Safety Considerations for Self and Others  Outcome: Improving     Problem: Activity and Energy Impairment (Anxiety Signs/Symptoms)  Goal: Optimized Energy Level (Anxiety Signs/Symptoms)  Outcome: Improving     Problem: Mood Impairment (Anxiety Signs/Symptoms)  Goal: Improved Mood Symptoms (Anxiety Signs/Symptoms)  Outcome: Improving   Patient is still isolating in her room except for meals.,said she does not feel comfortable coming out in the unit. She rated anxiety 4/10,depression 5/10 and denies other psych symptom. Court paperwork was given to patient.

## 2021-11-23 NOTE — PLAN OF CARE
Problem: Behavioral Health Plan of Care  Goal: Plan of Care Review  Outcome: Improving  Goal: Adheres to Safety Considerations for Self and Others  Intervention: Develop and Maintain Individualized Safety Plan  Recent Flowsheet Documentation  Taken 11/23/2021 0440 by Velia Hennessy RN  Safety Measures: safety rounds completed  Goal: Absence of New-Onset Illness or Injury  Intervention: Identify and Manage Fall Risk  Recent Flowsheet Documentation  Taken 11/23/2021 0440 by Velia Hennessy RN  Safety Measures: safety rounds completed     Problem: Sleep Disturbance  Goal: Adequate Sleep/Rest  Outcome: Improving     Problem: Activity and Energy Impairment (Anxiety Signs/Symptoms)  Goal: Optimized Energy Level (Anxiety Signs/Symptoms)  Outcome: Improving
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
## 2021-11-23 NOTE — PROGRESS NOTES
11/23/21 1046   Engagement   Intervention Group   Topic Detail Exercise dice and social questions for large motor movement, encouraging sharing of thoughts/feelings, coping, symptom management, self-care, encouraging a daily routine and socialization   Attendance Did not attend

## 2021-11-24 PROCEDURE — 250N000013 HC RX MED GY IP 250 OP 250 PS 637: Performed by: PSYCHIATRY & NEUROLOGY

## 2021-11-24 PROCEDURE — 250N000013 HC RX MED GY IP 250 OP 250 PS 637: Performed by: NURSE PRACTITIONER

## 2021-11-24 PROCEDURE — 99231 SBSQ HOSP IP/OBS SF/LOW 25: CPT | Performed by: PSYCHIATRY & NEUROLOGY

## 2021-11-24 PROCEDURE — 128N000001 HC R&B CD/MH ADULT

## 2021-11-24 RX ADMIN — DIVALPROEX SODIUM 500 MG: 250 TABLET, FILM COATED, EXTENDED RELEASE ORAL at 20:59

## 2021-11-24 RX ADMIN — ACETAMINOPHEN 650 MG: 325 TABLET ORAL at 08:26

## 2021-11-24 RX ADMIN — ACETAMINOPHEN 650 MG: 325 TABLET ORAL at 16:34

## 2021-11-24 RX ADMIN — DIVALPROEX SODIUM 500 MG: 250 TABLET, FILM COATED, EXTENDED RELEASE ORAL at 08:23

## 2021-11-24 RX ADMIN — CHOLECALCIFEROL (VITAMIN D3) 10 MCG (400 UNIT) TABLET 10 MCG: at 08:23

## 2021-11-24 RX ADMIN — QUETIAPINE FUMARATE 600 MG: 300 TABLET ORAL at 20:59

## 2021-11-24 ASSESSMENT — ACTIVITIES OF DAILY LIVING (ADL)
HYGIENE/GROOMING: INDEPENDENT
ORAL_HYGIENE: INDEPENDENT
DRESS: INDEPENDENT
ORAL_HYGIENE: INDEPENDENT
DRESS: INDEPENDENT
HYGIENE/GROOMING: INDEPENDENT

## 2021-11-24 NOTE — PLAN OF CARE
Problem: Behavioral Health Plan of Care  Goal: Adheres to Safety Considerations for Self and Others  Intervention: Develop and Maintain Individualized Safety Plan  Recent Flowsheet Documentation  Taken 11/24/2021 4827 by Mildred Mann, RN  Safety Measures: safety rounds completed     Problem: Sleep Disturbance  Goal: Adequate Sleep/Rest  Outcome: Improving      Patient was sleeping through the night, with any acute distress. Safety checks completed Q 15 min's per protocol. She had 7 hrs of sleep.

## 2021-11-24 NOTE — PLAN OF CARE
"Assessment/Intervention/Current Symptoms and Care Coordination  Writer met with the patient to check in. She reports doing \"better\" and feeling safer since moving to another unit.  Pt did not verbalize delusions during this session. She keeps her bible open and near her. She was coloring artwork on her bed this morning. She appears pleasant, cooperative, and thankful for assistance.Pt continues to prefer to go to an IRTS in the [place] area. She signed a couple more ROIs for [facility] and [facility]. They have a waitlist as well. Pt had no further questions or concerns at this time.      Discharge Plan or Goal  IRTS facility     Barriers to Discharge   Symptom stabilization, Medication Management, Care Coordination, court hold     Referral Status  Referred to [facility](not taking pts due to COVID), [facility], [facility], [facility].  [facility] : Laura Benítez [phone] [email or web address]     Interested in resources including applying for social security, finding a supportive living environment, and job assistance. Community CM to assist.      Legal Status  Court hold. [facility] court scheduled 11/22 and 11/24. Pt signed waiver for Stayed Order of Commitment  "

## 2021-11-24 NOTE — PROGRESS NOTES
11/24/21 1057   Engagement   Intervention Group   Topic Detail Pictionary for concentration, healthy leisure exploration, coping, social engagement, and organization   Attendance Attended   Patient Response Demonstrated understanding of materials provided;Positive attitude;Was respectful;Improved mood in response to group;Contributes to conversation;Prosocial behavior   Concentrated on Task duration of group   Cognition Goal-directed;Follows through with task;Attends to detail   Mood/Affect Pleasant   Social/Behavioral Engaged;Cooperative  (Writer gave pt two fuzzy posters to color at the end of group and pt smiled and thanked writer.)   Thought Content Reality oriented   Goals addressed in session today Pt engaged in group and actively participated (guessing peers' drawings and choosing words to draw).   Supervision   Supervision On-site supervision provided

## 2021-11-24 NOTE — PLAN OF CARE
Problem: Activity and Energy Impairment (Anxiety Signs/Symptoms)  Goal: Optimized Energy Level (Anxiety Signs/Symptoms)  Outcome: Improving  Patient's anxiety was 10 at the start of shift, had Neurontin 300 mg at 1631, anxiety down to 2 thereafter        Patient was isolative to room at the start of shift, rated anxiety 10, depression 3, patient was given Neurontin 300 mg at 1637 with effect. Denied other psych symptoms, contracted for safety.Visible on the unit intermittently after supper, kept to self though. Patient had minimal interaction with nursing staff too, calm and pleasant when approached

## 2021-11-24 NOTE — PLAN OF CARE
Problem: Behavioral Health Plan of Care  Goal: Plan of Care Review  Outcome: Improving  Flowsheets (Taken 11/24/2021 1200)  Plan of Care Reviewed With: patient  Patient Agreement with Plan of Care: agrees     Patient cooperative and pleasant. Anxiety 9/10 and depression 0/10; denies SI/SIB, AH/VH, HI, contracts for safety. Declined prn for anxiety, c/o vaginal pain 9/10; prn tylenol helpful. Pt present on unit watching movie at this time; otherwise isolative to room. Will continue to monitor.  Rj Mcnulty RN

## 2021-11-24 NOTE — PROGRESS NOTES
11/24/21 1539   Engagement   Intervention Group   Topic Detail Creative endeavor for healthy leisure, socialization, and distraction through symptom management   Attendance Attended   Patient Response Demonstrated understanding of materials provided;Contributes to conversation;Asked questions and/or took notes;Was respectful  (Pt was able to use materials appropriately for staining after initial instructions. She asked for tape to hold a few tiles in place at the end of group.)   Concentrated on Task duration of group   Cognition Goal-directed;Follows through with task;Sequences task   Mood/Affect Pleasant   Social/Behavioral Cooperative;Engaged   Thought Content Reality oriented   Goals addressed in session today Pt engaged in the activity (staining and designing the tile box) and enjoying the group.   Supervision   Supervision On-site supervision provided
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
## 2021-11-25 LAB — VALPROATE SERPL-MCNC: 100.2 UG/ML

## 2021-11-25 PROCEDURE — 36415 COLL VENOUS BLD VENIPUNCTURE: CPT | Performed by: PSYCHIATRY & NEUROLOGY

## 2021-11-25 PROCEDURE — 80164 ASSAY DIPROPYLACETIC ACD TOT: CPT | Performed by: PSYCHIATRY & NEUROLOGY

## 2021-11-25 PROCEDURE — 128N000001 HC R&B CD/MH ADULT

## 2021-11-25 PROCEDURE — 250N000013 HC RX MED GY IP 250 OP 250 PS 637: Performed by: PSYCHIATRY & NEUROLOGY

## 2021-11-25 PROCEDURE — 80165 DIPROPYLACETIC ACID FREE: CPT | Performed by: PSYCHIATRY & NEUROLOGY

## 2021-11-25 PROCEDURE — 250N000013 HC RX MED GY IP 250 OP 250 PS 637: Performed by: NURSE PRACTITIONER

## 2021-11-25 RX ADMIN — CHOLECALCIFEROL (VITAMIN D3) 10 MCG (400 UNIT) TABLET 10 MCG: at 08:19

## 2021-11-25 RX ADMIN — ACETAMINOPHEN 650 MG: 325 TABLET ORAL at 08:19

## 2021-11-25 RX ADMIN — DIVALPROEX SODIUM 500 MG: 250 TABLET, FILM COATED, EXTENDED RELEASE ORAL at 21:03

## 2021-11-25 RX ADMIN — DIVALPROEX SODIUM 500 MG: 250 TABLET, FILM COATED, EXTENDED RELEASE ORAL at 08:19

## 2021-11-25 RX ADMIN — QUETIAPINE FUMARATE 600 MG: 300 TABLET ORAL at 21:03

## 2021-11-25 ASSESSMENT — ACTIVITIES OF DAILY LIVING (ADL)
ORAL_HYGIENE: INDEPENDENT
HYGIENE/GROOMING: INDEPENDENT
DRESS: INDEPENDENT
DRESS: INDEPENDENT
HYGIENE/GROOMING: INDEPENDENT
ORAL_HYGIENE: INDEPENDENT

## 2021-11-25 NOTE — PLAN OF CARE
Problem: Behavioral Health Plan of Care  Goal: Plan of Care Review  Outcome: Improving  Flowsheets (Taken 11/25/2021 0900)  Plan of Care Reviewed With: patient  Patient Agreement with Plan of Care: agrees     Patient alert and cooperative; pleasant on approach. Anxiety 1/10 and depression 0/10; denies SI/SIB, HI, AH/VH; contracts for safety. C/O pain to vaginal area 2/10; prn tylenol given which was helpful. Isolative to self except during mealtimes. Will continue to monitor.  Rj Mcnulty RN

## 2021-11-25 NOTE — PROGRESS NOTES
11/25/21 1400   Engagement   Intervention Group   Topic Detail OT: Thankschavez Navarro to promote coping through distraction, healthy liesure, increased concentration, sequencing, direction following, and socialization   Attendance Did not attend   Reason for Not Attending Excused  (did not wake to writers voice upon invite)

## 2021-11-25 NOTE — PLAN OF CARE
Problem: Behavioral Health Plan of Care  Goal: Adheres to Safety Considerations for Self and Others  Intervention: Develop and Maintain Individualized Safety Plan  Recent Flowsheet Documentation  Taken 11/25/2021 0241 by Mildred Mann, RN  Safety Measures:   environmental rounds completed   safety rounds completed     Problem: Sleep Disturbance  Goal: Adequate Sleep/Rest  Outcome: Improving      Patient had a good night, slept for most part of the shift. Safety checks in place per protocol. No concerns noted/reported. She had more than 6 hrs of sleep.

## 2021-11-25 NOTE — PLAN OF CARE
Problem: Behavioral Health Plan of Care  Goal: Plan of Care Review  Outcome: Improving  Flowsheets  Taken 11/24/2021 2233  Progress: improving  Patient Agreement with Plan of Care: agrees  Taken 11/24/2021 2225  Plan of Care Reviewed With: patient  Patient Agreement with Plan of Care: agrees  Goal: Adheres to Safety Considerations for Self and Others  Outcome: Improving  Goal: Absence of New-Onset Illness or Injury  Outcome: Improving     Problem: Activity and Energy Impairment (Anxiety Signs/Symptoms)  Goal: Optimized Energy Level (Anxiety Signs/Symptoms)  Outcome: Improving     Patient denied suicidal and homicidal ideation. She rated anxiety at 5/10 but denied depression. She was isolative to room most of the time this shift but came out into the milieu a few times with no engagement with peers. Patient was pleasant and medication compliant.

## 2021-11-26 LAB — VALPROATE FREE SERPL-MCNC: 19.6 UG/ML

## 2021-11-26 PROCEDURE — 250N000013 HC RX MED GY IP 250 OP 250 PS 637: Performed by: NURSE PRACTITIONER

## 2021-11-26 PROCEDURE — 99231 SBSQ HOSP IP/OBS SF/LOW 25: CPT | Performed by: PSYCHIATRY & NEUROLOGY

## 2021-11-26 PROCEDURE — 128N000001 HC R&B CD/MH ADULT

## 2021-11-26 PROCEDURE — 250N000013 HC RX MED GY IP 250 OP 250 PS 637: Performed by: PSYCHIATRY & NEUROLOGY

## 2021-11-26 RX ADMIN — CHOLECALCIFEROL (VITAMIN D3) 10 MCG (400 UNIT) TABLET 10 MCG: at 09:14

## 2021-11-26 RX ADMIN — QUETIAPINE FUMARATE 600 MG: 300 TABLET ORAL at 20:33

## 2021-11-26 RX ADMIN — DIVALPROEX SODIUM 500 MG: 250 TABLET, FILM COATED, EXTENDED RELEASE ORAL at 09:13

## 2021-11-26 RX ADMIN — DIVALPROEX SODIUM 500 MG: 250 TABLET, FILM COATED, EXTENDED RELEASE ORAL at 20:33

## 2021-11-26 ASSESSMENT — ACTIVITIES OF DAILY LIVING (ADL)
DRESS: INDEPENDENT
ORAL_HYGIENE: INDEPENDENT
DRESS: INDEPENDENT
HYGIENE/GROOMING: INDEPENDENT
HYGIENE/GROOMING: INDEPENDENT
ORAL_HYGIENE: INDEPENDENT
LAUNDRY: WITH SUPERVISION
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
## 2021-11-26 NOTE — PLAN OF CARE
Problem: Behavioral Health Plan of Care  Goal: Optimized Coping Skills in Response to Life Stressors  Outcome: Improving  Goal: Develops/Participates in Therapeutic [place] to Support Successful Transition  Outcome: Improving  Intervention: Foster Therapeutic [place]  Recent Flowsheet Documentation  Taken 11/25/2021 1800 by Keon Drew RN  Trust Relationship/Rapport:   questions answered   questions encouraged     Problem: Activity and Energy Impairment (Anxiety Signs/Symptoms)  Goal: Optimized Energy Level (Anxiety Signs/Symptoms)  Outcome: Improving      Patient stayed in her room most of the shift. She was out in the milieu during meals and groups only and was alert and oriented x4. She denied all psych symptoms and contracted for safety. No psychosis or suicidal ideations observed. Patient was overall calm, pleasant, and respectful.  Will continue to monitor and provide care.

## 2021-11-26 NOTE — PLAN OF CARE
Patient was calm and slept all night, safety checks were conducted every 15 minutes. No pain was reported and patient denied all psych symptoms. No PRN was given and no medication was due at this time.

## 2021-11-26 NOTE — PLAN OF CARE
Problem: Behavioral Health Plan of Care  Goal: Optimized Coping Skills in Response to Life Stressors  Outcome: No Change     Problem: Behavioral Health Plan of Care  Goal: Develops/Participates in Therapeutic [place] to Support Successful Transition  Outcome: No Change     Problem: Activity and Energy Impairment (Anxiety Signs/Symptoms)  Goal: Optimized Energy Level (Anxiety Signs/Symptoms)  Outcome: No Change     Pt is pleasant and cooperative, attended OT group in am.  Pt is isolative in her room, out for meals.  Pt rated anxiety at 8/10.  Pt denied depression, SI/HI, hallucinations and pain.  Pt reports she is awaiting placement at an IRTS facility.  Pt placed on court hold.  Valproic acid lab draw tomorrow am.

## 2021-11-26 NOTE — PLAN OF CARE
Goal review completed:       Problem: Relapse Prevention  Goal: Engage in OT Group  Description: Target: Patient will attend and engage in at least 1 OT group daily.  11/26/2021 1440 by Haley Whipple  Outcome: No Change    Patient was receptive to meeting with writer but was reserved and quiet throughout meeting. Patient has engaged in at least 1 OT group daily for 4/8 days (a little less than last review period 6/8). Care plan goals have been reviewed. Continue care plan and interventions for further progress related to attending and engaging in OT groups. Patient has not made continued progress towards goals. Pt noted that she liked being creative and enjoyed the groups that involved making crafts, like the tile box. She also expressed interest in playing the guitar and piano. She was shown where the guitar and piano were and told she could play them during group. She shared that she was bored on this unit and felt that there was not much to do. Writer offered pt several activity options and pt chose a fuzzy poster.     Continue to establish rapport and encourage group participation with focus on goal area/s for further progress. Care plan updated to reflect changes. Continue to correspond with interdisciplinary team regarding ongoing treatment plan, potential changes in patient's status, and discharge planning.

## 2021-11-26 NOTE — PROGRESS NOTES
"   11/26/21 6053   Engagement   Intervention Group   Topic Detail Qigong for wellness, relaxation, and coping with sx through distractions   Attendance Attended   Patient Response Demonstrated understanding of materials provided;Was respectful;Contributes to conversation;Expressed feelings/issues  (She politely shared at the end of group she did not want to participate in additional stretching.)   Concentrated on Task 15 - 30 min  (Pt arrived late to group but participated for approx. the last 30 minutes.)   Cognition Goal-directed;Follows through with task   Mood/Affect Pleasant   Social/Behavioral Engaged;Cooperative   Thought Content Reality oriented   Goals addressed in session today Pt engaged in group following along with the DVD and completing the movements. She shared that she enjoyed the activity and \"could feel her core\" from the exercises.   Supervision   Supervision On-site supervision provided     "

## 2021-11-26 NOTE — PROGRESS NOTES
Legal:  Patient agreed to a Stayed Order for Commitment and Treatment with [facility].  Patient signed waivers on November 19, 2021.  File number [id number]  This is effective  November 19, 2021 and continued to May 19, 2022 unless otherwise directed by the court.  Patient agreed to be committed to the head of [facility] and [facility] of [facility] and would obtain voluntary treatment under a Stayed Order of Commitment.  Copy of order received.  A Change of Status to be completed upon discharge.   CAITLIN Eddy  11/26/2021 9:23 AM

## 2021-11-27 LAB
SARS-COV-2 RNA RESP QL NAA+PROBE: NEGATIVE
VALPROATE SERPL-MCNC: 107.4 UG/ML

## 2021-11-27 PROCEDURE — 250N000013 HC RX MED GY IP 250 OP 250 PS 637: Performed by: NURSE PRACTITIONER

## 2021-11-27 PROCEDURE — 36415 COLL VENOUS BLD VENIPUNCTURE: CPT | Performed by: PSYCHIATRY & NEUROLOGY

## 2021-11-27 PROCEDURE — 80164 ASSAY DIPROPYLACETIC ACD TOT: CPT | Performed by: PSYCHIATRY & NEUROLOGY

## 2021-11-27 PROCEDURE — 87635 SARS-COV-2 COVID-19 AMP PRB: CPT | Performed by: PSYCHIATRY & NEUROLOGY

## 2021-11-27 PROCEDURE — 128N000001 HC R&B CD/MH ADULT

## 2021-11-27 PROCEDURE — 250N000013 HC RX MED GY IP 250 OP 250 PS 637: Performed by: PSYCHIATRY & NEUROLOGY

## 2021-11-27 RX ADMIN — ACETAMINOPHEN 650 MG: 325 TABLET ORAL at 08:11

## 2021-11-27 RX ADMIN — CHOLECALCIFEROL (VITAMIN D3) 10 MCG (400 UNIT) TABLET 10 MCG: at 08:11

## 2021-11-27 RX ADMIN — QUETIAPINE FUMARATE 600 MG: 300 TABLET ORAL at 20:32

## 2021-11-27 RX ADMIN — DIVALPROEX SODIUM 500 MG: 250 TABLET, FILM COATED, EXTENDED RELEASE ORAL at 20:32

## 2021-11-27 RX ADMIN — DIVALPROEX SODIUM 500 MG: 250 TABLET, FILM COATED, EXTENDED RELEASE ORAL at 08:11

## 2021-11-27 RX ADMIN — ACETAMINOPHEN 650 MG: 325 TABLET ORAL at 18:32

## 2021-11-27 ASSESSMENT — MIFFLIN-ST. JEOR: SCORE: 1657.17

## 2021-11-27 ASSESSMENT — ACTIVITIES OF DAILY LIVING (ADL)
DRESS: INDEPENDENT
HYGIENE/GROOMING: INDEPENDENT
ORAL_HYGIENE: INDEPENDENT

## 2021-11-27 NOTE — PLAN OF CARE
"15-minute safety checks in progress as per unit policy. Contracted for safety. Rated chronic vaginal pain as 3/10, received PRN Tylenol. Upon re-check, rated pain at 1/10. Rated anxiety 9/10, she said \"I had a nightmare last night, that's why I'm anxious.\" Denied depression. Med-compliant. Did not wear mask on the unit. Spent most of the shift in her room, came out to eat meals, read in a lounge chair for a brief period of time. Attended the morning OT group. Collected COVID swab and sent to lab. Patient took a shower this shift.   Problem: Behavioral Health Plan of Care  Goal: Adheres to Safety Considerations for Self and Others  Outcome: Improving  Goal: Absence of New-Onset Illness or Injury  Outcome: Improving     Problem: Activity and Energy Impairment (Anxiety Signs/Symptoms)  Goal: Optimized Energy Level (Anxiety Signs/Symptoms)  Outcome: Improving     Problem: Cognitive Impairment (Anxiety Signs/Symptoms)  Goal: Optimized Cognitive Function (Anxiety Signs/Symptoms)  Outcome: Improving     Problem: Mood Impairment (Anxiety Signs/Symptoms)  Goal: Improved Mood Symptoms (Anxiety Signs/Symptoms)  Outcome: Improving     Problem: Activity and Energy Impairment (Depressive Signs/Symptoms)  Goal: Optimized Energy Level (Depressive Signs/Symptoms)  Outcome: Improving     Problem: Pain Chronic (Persistent)  Goal: Acceptable Pain Control and Functional Ability  Outcome: Improving     "

## 2021-11-27 NOTE — PLAN OF CARE
Problem: Behavioral Health Plan of Care  Goal: Plan of Care Review  Outcome: Improving  Flowsheets  Taken 11/26/2021 1803  Plan of Care Reviewed With: patient  Patient Agreement with Plan of Care: agrees  Taken 11/26/2021 1753  Plan of Care Reviewed With: patient  Patient Agreement with Plan of Care: agrees     Problem: Sleep Disturbance  Goal: Adequate Sleep/Rest  Outcome: Improving     Problem: Activity and Energy Impairment (Anxiety Signs/Symptoms)  Goal: Optimized Energy Level (Anxiety Signs/Symptoms)  Outcome: Improving  Intervention: Optimize Energy Level  Recent Flowsheet Documentation  Taken 11/26/2021 1753 by Hernan Gale RN  Diversional Activity: television     Problem: Cognitive Impairment (Anxiety Signs/Symptoms)  Goal: Optimized Cognitive Function (Anxiety Signs/Symptoms)  Outcome: Improving     Patient denied all psych symptoms but was isolative to room at times. Even when out in the milieu, she did not engage with peers. She spent a considerable amount of time this evening in the lounge watching television. Patient was pleasant and smiling during psychosocial assessment. She denied pain. She was approached for a covid test this evening but she pleasantly said she would rather have it done tomorrow morning.

## 2021-11-27 NOTE — PROGRESS NOTES
Refused AM labs for valporic acid. Last draw was 11/25 and result was therapeutic.Got agitated and asked staff to leave her room when writer was trying to explain rational and education.Safety maintained. Slept all night.

## 2021-11-27 NOTE — PROGRESS NOTES
11/27/21 1156   Engagement   Intervention Group   Topic Detail Creative endeavor for relaxation, attention to detail, coping with sx through distractions, and healthy leisure. (Sand art).   Attendance Attended   Patient Response Demonstrated understanding of materials provided;Prosocial behavior;Improved mood in response to group;Contributes to conversation;Was respectful   Concentrated on Task duration of group   Cognition Goal-directed   Mood/Affect Bright;Congruent;Pleasant   Social/Behavioral Cooperative;Engaged   Thought Content Reality oriented   Goals addressed in session today Patient expressed interest and satisfaction in this activity. She demonstrated an improved mood in response to group with a bright affect. She was respectful and pleasant.

## 2021-11-28 PROCEDURE — 128N000001 HC R&B CD/MH ADULT

## 2021-11-28 PROCEDURE — 250N000013 HC RX MED GY IP 250 OP 250 PS 637: Performed by: NURSE PRACTITIONER

## 2021-11-28 PROCEDURE — 250N000013 HC RX MED GY IP 250 OP 250 PS 637: Performed by: PSYCHIATRY & NEUROLOGY

## 2021-11-28 RX ADMIN — QUETIAPINE FUMARATE 600 MG: 300 TABLET ORAL at 20:34

## 2021-11-28 RX ADMIN — CHOLECALCIFEROL (VITAMIN D3) 10 MCG (400 UNIT) TABLET 10 MCG: at 08:05

## 2021-11-28 RX ADMIN — DIVALPROEX SODIUM 500 MG: 250 TABLET, FILM COATED, EXTENDED RELEASE ORAL at 08:05

## 2021-11-28 RX ADMIN — DIVALPROEX SODIUM 500 MG: 250 TABLET, FILM COATED, EXTENDED RELEASE ORAL at 20:34

## 2021-11-28 ASSESSMENT — ACTIVITIES OF DAILY LIVING (ADL)
ORAL_HYGIENE: INDEPENDENT
HYGIENE/GROOMING: INDEPENDENT
DRESS: INDEPENDENT

## 2021-11-28 NOTE — PROGRESS NOTES
Pt denies pain. She has been calm and compliant with med. The writer takes over patient care at 1900. Pt is now in bed with her nurse call button within her reach. The writer is continuing to monitor and assist pt as needed.

## 2021-11-28 NOTE — PLAN OF CARE
Problem: Behavioral Health Plan of Care  Goal: Plan of Care Review  Outcome: Improving  Flowsheets  Taken 11/27/2021 1842  Plan of Care Reviewed With: patient  Progress: improving  Patient Agreement with Plan of Care: agrees  Taken 11/27/2021 1837  Plan of Care Reviewed With: patient  Patient Agreement with Plan of Care: agrees  Goal: Absence of New-Onset Illness or Injury  Outcome: Improving  Intervention: Identify and Manage Fall Risk  Recent Flowsheet Documentation  Taken 11/27/2021 1837 by Hernan Gale, RN  Safety Measures: safety rounds completed  Goal: Develops/Participates in Therapeutic [place] to Support Successful Transition  Outcome: Improving  Intervention: Foster Therapeutic [place]  Recent Flowsheet Documentation  Taken 11/27/2021 1837 by Hernan Gale, RN  Trust Relationship/Rapport:   care explained   choices provided   emotional support provided   empathic listening provided     Problem: Cognitive Impairment (Anxiety Signs/Symptoms)  Goal: Optimized Cognitive Function (Anxiety Signs/Symptoms)  Outcome: Improving    Patent denied suicidal and homicidal ideation. She denied visual and auditory hallucinations and did not seem to be responding to internal stimuli. She rated her anxiety at 3/10 and so was depression. Patient spent the first half of the shift that writer was assigned out in the milieu watching television and a movie. She did not engage with peers but was pleasant during psychosocial assessment. She complained of vaginal pain with a rating of 2/10 for which Tylenol was given.

## 2021-11-28 NOTE — PLAN OF CARE
"Denied pain. 15-minute safety checks in progress as per unit policy. Contracted for safety. Denied anxiety, depression, hallucinations or delusions. Said \"I didn't have a nightmare last night, so that's good.\" Patient played the keyboard on the milieu after breakfast. She stayed on the milieu and watched TV with peers unlike past days where she isolated to room. Med-compliant. Patient did not wear a mask on the unit.   Problem: Behavioral Health Plan of Care  Goal: Patient-Specific Goal (Individualization)  Outcome: Improving  Goal: Adheres to Safety Considerations for Self and Others  Outcome: Improving  Goal: Absence of New-Onset Illness or Injury  Outcome: Improving  Goal: Optimized Coping Skills in Response to Life Stressors  Outcome: Improving     Problem: Activity and Energy Impairment (Anxiety Signs/Symptoms)  Goal: Optimized Energy Level (Anxiety Signs/Symptoms)  Outcome: Improving     Problem: Pain Chronic (Persistent)  Goal: Acceptable Pain Control and Functional Ability  Outcome: Improving     "

## 2021-11-29 PROCEDURE — 128N000001 HC R&B CD/MH ADULT

## 2021-11-29 PROCEDURE — 250N000013 HC RX MED GY IP 250 OP 250 PS 637: Performed by: PSYCHIATRY & NEUROLOGY

## 2021-11-29 PROCEDURE — 250N000013 HC RX MED GY IP 250 OP 250 PS 637: Performed by: NURSE PRACTITIONER

## 2021-11-29 PROCEDURE — 99231 SBSQ HOSP IP/OBS SF/LOW 25: CPT | Performed by: PSYCHIATRY & NEUROLOGY

## 2021-11-29 RX ADMIN — DIVALPROEX SODIUM 500 MG: 250 TABLET, FILM COATED, EXTENDED RELEASE ORAL at 20:13

## 2021-11-29 RX ADMIN — QUETIAPINE FUMARATE 600 MG: 300 TABLET ORAL at 20:13

## 2021-11-29 RX ADMIN — CHOLECALCIFEROL (VITAMIN D3) 10 MCG (400 UNIT) TABLET 10 MCG: at 08:25

## 2021-11-29 RX ADMIN — DIVALPROEX SODIUM 500 MG: 250 TABLET, FILM COATED, EXTENDED RELEASE ORAL at 08:25

## 2021-11-29 ASSESSMENT — ACTIVITIES OF DAILY LIVING (ADL)
ORAL_HYGIENE: INDEPENDENT
DRESS: STREET CLOTHES;INDEPENDENT
HYGIENE/GROOMING: INDEPENDENT

## 2021-11-29 NOTE — PLAN OF CARE
Alert and oriented x4, able to communicate needs. Calm, pleasant, cooperative and compliant with medications. Visible in milieu, social but minimal interactions with peers, mostly reading Bible and taking notes. Patient denied any pain or discomfort. Denied anxiety or depression, no SI/HI, contracted for safety. No hallucinations or delusion.  Problem: Behavioral Health Plan of Care  Goal: Adheres to Safety Considerations for Self and Others  Outcome: Improving  Intervention: Develop and Maintain Individualized Safety Plan  Recent Flowsheet Documentation  Taken 11/29/2021 1600 by Rene Cowan, RN  Safety Measures:    safety rounds completed    environmental rounds completed  Goal: Absence of New-Onset Illness or Injury  Outcome: Improving  Intervention: Identify and Manage Fall Risk  Recent Flowsheet Documentation  Taken 11/29/2021 1600 by Rene Cowan RN  Safety Measures:    safety rounds completed    environmental rounds completed  Goal: Optimized Coping Skills in Response to Life Stressors  Outcome: Improving  Goal: Develops/Participates in Therapeutic [place] to Support Successful Transition  Outcome: Improving     Problem: Activity and Energy Impairment (Anxiety Signs/Symptoms)  Goal: Optimized Energy Level (Anxiety Signs/Symptoms)  Outcome: Improving  Flowsheets (Taken 11/29/2021 1644)  Mutually Determined Action Steps (Optimized Energy Level): grooms self without prompting  Intervention: Optimize Energy Level  Recent Flowsheet Documentation  Taken 11/29/2021 1600 by Rene Cowan RN  Diversional Activity: television     Problem: Mood Impairment (Anxiety Signs/Symptoms)  Goal: Improved Mood Symptoms (Anxiety Signs/Symptoms)  Outcome: Improving  Flowsheets (Taken 11/29/2021 1644)  Mutually Determined Action Steps (Improved Mood Symptoms): adheres to medication regimen     Problem: Behavioral Health Plan of Care  Goal: Plan of Care Review  Recent Flowsheet Documentation  Taken 11/29/2021 1600  by Rene Cowan RN  Plan of Care Reviewed With: patient  Patient Agreement with Plan of Care: agrees     Problem: Activity and Energy Impairment (Depressive Signs/Symptoms)  Goal: Optimized Energy Level (Depressive Signs/Symptoms)  Recent Flowsheet Documentation  Taken 11/29/2021 1644 by Rene Cowan RN  Mutually Determined Action Steps (Optimized Energy Level): grooms self without prompting  Intervention: Optimize Energy Level  Recent Flowsheet Documentation  Taken 11/29/2021 1600 by Rene Cowan, RN  Diversional Activity: television     Problem: Mood Impairment (Depressive Signs/Symptoms)  Goal: Improved Mood Symptoms (Depressive Signs/Symptoms)  Intervention: Promote Mood Improvement  Recent Flowsheet Documentation  Taken 11/29/2021 1600 by Rene Cowan, RN  Diversional Activity: television

## 2021-11-29 NOTE — PLAN OF CARE
Assessment/Intervention/Current Symptoms and Care Coordination  Writer met with the patient to check in and consulted with psychiatrist during team meeting.  Writer updated pt on IRTS referrals. She has a interview with [facility] 11/30/2021 at 9:30am Via Teams. Writer will assist. Bibiana appeared happy to have an interview, clapped her hands, and smiled broadly. She gave Combined Application Form to writer to give to her CM for community resources.  Pt had no further questions or concerns at this time.   Writer updated pt's .     Discharge Plan or Goal  IRTS facility     Barriers to Discharge   Symptom stabilization, Medication Management, Care Coordination, court hold     Referral Status  Referred to [facility] ( 11/29) and [facility](not taking pts due to COVID) and [facility] (on waitlist 2-3 weeks) [employer] ([facility] 11/29)  [facility] : Laura Benítez [phone] [email or web address]     Interested in resources including applying for social security, finding a supportive living environment, and job assistance. Community CM to assist.      Legal Status  Per Legal :   Patient agreed to a Stayed Order for Commitment and Treatment with Neuroleptic Medications in   [facility] Probate Court.  Patient signed waivers on November 19, 2021.  File number [id number]  This is effective  November 19, 2021 and continued to May 19, 2022 unless otherwise directed by the court.  Patient agreed to be committed to the head of [facility] and Commissioner of Human Services and would obtain voluntary treatment under a Stayed Order of Commitment.  Copy of order received.  A Change of Status to be completed upon discharge.

## 2021-11-29 NOTE — PLAN OF CARE
Problem: Behavioral Health Plan of Care  Goal: Plan of Care Review  Outcome: No Change     Problem: Behavioral Health Plan of Care  Goal: Adheres to Safety Considerations for Self and Others  Outcome: No Change  Intervention: Develop and Maintain Individualized Safety Plan  Recent Flowsheet Documentation  Taken 11/29/2021 9414 by Shraddha Walton RN  Safety Measures:    safety rounds completed    suicide assessment completed     Problem: Mood Impairment (Anxiety Signs/Symptoms)  Goal: Improved Mood Symptoms (Anxiety Signs/Symptoms)  Outcome: No Change     Pt is alert and oriented. She is calm, pleasant and cooperative with care. She spends most of her free time in her room, writing in her notebooks and creating a calendar.   Pt is med compliant. She rates anxiety 3/10, depression 4/10. Denies SI/HI. No overt psychosis noted this shift.   Pt reports she is feeling well and eager to discharge. She is aware that her disposition is pending IRTS referrals.

## 2021-11-29 NOTE — PLAN OF CARE
BEHAVIORAL TEAM DISCUSSION    -Dr. Hays  -Shraddha CAMARILLO Charge RN  -Puja SHERIDAN OT student  -Joy OLIVIA OT  -Miranda OhD     Anticipated length of stay:  Approx 3-5 days.  Continued Stay Criteria/Rationale: Symptom Stabilization, Medication Management, Care coordination, Acceptance to IRTS  Medical/Physical: No significant events  Precautions:   Behavioral Orders   Procedures     Code 1 - Restrict to Unit     Routine Programming     As clinically indicated     Status 15     Every 15 minutes.     Plan: Once psychiatrically stabilized, discharge to IRTS. Approx 3-5 days.  Rationale for change in precautions or plan:

## 2021-11-29 NOTE — PROGRESS NOTES
11/29/21 1537   Engagement   Intervention Group   Topic Detail Creative expression (painting word signs) for sequencing, leisure, relaxation, and coping with sx through distraction   Attendance Attended   Patient Response Demonstrated understanding of materials provided;Was respectful;Contributes to conversation  (Pt requested a song to listen to. She complemented her peer's work. Pt also took window cling from cabinet and started coloring it without permission.)   Concentrated on Task duration of group   Cognition Goal-directed;Sequences task;Follows through with task;Attends to detail  (Pt was deliberate about what colors she used and even blended a few together.)   Mood/Affect Pleasant   Social/Behavioral Cooperative;Engaged   Thought Content Reality oriented   Goals addressed in session today Pt engaged in the group activity painting her wooden word sign.   Supervision   Supervision On-site supervision provided
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
## 2021-11-29 NOTE — PROGRESS NOTES
11/29/21 1054   Engagement   Intervention Group   Topic Detail truth or dare wellness for socializing, leisure, and physical movement   Attendance Did not attend   Reason for Not Attending Excused  (Pt was showering)   Supervision   Supervision On-site supervision provided

## 2021-11-30 PROCEDURE — 250N000013 HC RX MED GY IP 250 OP 250 PS 637: Performed by: PSYCHIATRY & NEUROLOGY

## 2021-11-30 PROCEDURE — 99231 SBSQ HOSP IP/OBS SF/LOW 25: CPT | Performed by: PSYCHIATRY & NEUROLOGY

## 2021-11-30 PROCEDURE — 128N000001 HC R&B CD/MH ADULT

## 2021-11-30 PROCEDURE — 250N000013 HC RX MED GY IP 250 OP 250 PS 637: Performed by: NURSE PRACTITIONER

## 2021-11-30 RX ORDER — QUETIAPINE FUMARATE 200 MG/1
400 TABLET, FILM COATED ORAL AT BEDTIME
Status: DISCONTINUED | OUTPATIENT
Start: 2021-11-30 | End: 2021-12-03 | Stop reason: HOSPADM

## 2021-11-30 RX ADMIN — CHOLECALCIFEROL (VITAMIN D3) 10 MCG (400 UNIT) TABLET 10 MCG: at 08:19

## 2021-11-30 RX ADMIN — DIVALPROEX SODIUM 500 MG: 250 TABLET, FILM COATED, EXTENDED RELEASE ORAL at 08:19

## 2021-11-30 RX ADMIN — DIVALPROEX SODIUM 500 MG: 250 TABLET, FILM COATED, EXTENDED RELEASE ORAL at 20:19

## 2021-11-30 RX ADMIN — QUETIAPINE FUMARATE 400 MG: 200 TABLET ORAL at 20:19

## 2021-11-30 RX ADMIN — GABAPENTIN 100 MG: 100 CAPSULE ORAL at 20:20

## 2021-11-30 ASSESSMENT — ACTIVITIES OF DAILY LIVING (ADL)
HYGIENE/GROOMING: INDEPENDENT
ORAL_HYGIENE: INDEPENDENT
DRESS: INDEPENDENT

## 2021-11-30 NOTE — PROGRESS NOTES
11/30/21 1050   Engagement   Intervention Group   Topic Detail Bridge of Self Confidence activity for wellbeing, social enagement, and improving feelings of self-worth   Attendance Did not attend   Reason for Not Attending Excused  (Pt had an interview)   Supervision   Supervision On-site supervision provided

## 2021-11-30 NOTE — PLAN OF CARE
Problem: Behavioral Health Plan of Care  Goal: Plan of Care Review  Outcome: No Change     Problem: Behavioral Health Plan of Care  Goal: Adheres to Safety Considerations for Self and Others  Outcome: No Change  Intervention: Develop and Maintain Individualized Safety Plan  Recent Flowsheet Documentation  Taken 11/30/2021 0819 by Shraddha Walton RN  Safety Measures:    safety rounds completed    suicide assessment completed     Problem: Mood Impairment (Anxiety Signs/Symptoms)  Goal: Improved Mood Symptoms (Anxiety Signs/Symptoms)  Outcome: No Change     Pt is alert and oriented. She is calm, pleasant and cooperative with care. Bright affect. She is attending groups.   Pt rates anxiety 7/10, depression 5/10. Denies SI. No overt delusions or paranoia noted.   Pt was given time on the computer to research how to obtain medical records from previous facilities.     Pt denies pain concerns at this time.
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
## 2021-11-30 NOTE — PROGRESS NOTES
Assumed care of patient at 1130 pm, in bed sleeping without any s/s distress.Breathing easy,regular non-labored.No behavioral issues noted.No signs of SI,HI,SIB or psychosis noted.15 minute safety checks maintained as per policy.Slept all night and currently still sleeping.

## 2021-11-30 NOTE — PROGRESS NOTES
"   11/30/21 1519   Engagement   Intervention Group   Topic Detail bag toss to facilitate social engagement and teamwork, frustration tolerance, and leisure exploration   Attendance Attended   Patient Response Demonstrated understanding of materials provided;Was respectful;Positive attitude;Improved mood in response to group;Contributes to conversation;Prosocial behavior  (Pt answered the check-in question sharing her favorite outdoor game to play was \"tag\". She also encouraged peers throughout group and maintained a positive attitude.)   Concentrated on Task duration of group   Cognition Goal-directed;Follows through with task   Mood/Affect Pleasant   Social/Behavioral Engaged;Cooperative   Thought Content Reality oriented   Goals addressed in session today Progressing: Pt attended group and engaged in the activity. She reported enjoying the game and would play it again.   Supervision   Supervision On-site supervision provided     "

## 2021-12-01 PROCEDURE — 128N000001 HC R&B CD/MH ADULT

## 2021-12-01 PROCEDURE — 99231 SBSQ HOSP IP/OBS SF/LOW 25: CPT | Performed by: PSYCHIATRY & NEUROLOGY

## 2021-12-01 PROCEDURE — 250N000013 HC RX MED GY IP 250 OP 250 PS 637: Performed by: PSYCHIATRY & NEUROLOGY

## 2021-12-01 PROCEDURE — 250N000013 HC RX MED GY IP 250 OP 250 PS 637: Performed by: NURSE PRACTITIONER

## 2021-12-01 RX ADMIN — DIVALPROEX SODIUM 500 MG: 250 TABLET, FILM COATED, EXTENDED RELEASE ORAL at 08:16

## 2021-12-01 RX ADMIN — CHOLECALCIFEROL (VITAMIN D3) 10 MCG (400 UNIT) TABLET 10 MCG: at 08:16

## 2021-12-01 RX ADMIN — QUETIAPINE FUMARATE 400 MG: 200 TABLET ORAL at 20:12

## 2021-12-01 RX ADMIN — DIVALPROEX SODIUM 500 MG: 250 TABLET, FILM COATED, EXTENDED RELEASE ORAL at 20:12

## 2021-12-01 ASSESSMENT — ACTIVITIES OF DAILY LIVING (ADL)
ORAL_HYGIENE: INDEPENDENT
DRESS: STREET CLOTHES
HYGIENE/GROOMING: INDEPENDENT

## 2021-12-01 NOTE — PROGRESS NOTES
12/01/21 1016   Engagement   Intervention Group   Topic Detail OT: Wellness group (exercise dice) to promote physical wellness, healthy leisure and routine building to manage mental health symptoms   Attendance Did not attend   Reason for Not Attending Refused

## 2021-12-01 NOTE — PROGRESS NOTES
"   12/01/21 1525   Engagement   Intervention Group   Topic Detail Positive affirmation activity for creative expression, wellbeing, sequencing, and coping   Attendance Attended   Patient Response Demonstrated understanding of materials provided;Was respectful;Contributes to conversation;Expressed feelings/issues  (Pt brought back completed fuzzy posters to writer and said she was done with them and didn't want them anymore. She volunteered to give examples of positive affirmations. She also complemented her peer's work saying \"ooh that's pretty\".)   Concentrated on Task duration of group   Cognition Goal-directed;Follows through with task   Mood/Affect Pleasant   Social/Behavioral Engaged;Cooperative   Thought Content [place]   Goals addressed in session today Pt engaged in activity creating a positive affirmation \"gift\" for herself.   Supervision   Supervision On-site supervision provided     "

## 2021-12-01 NOTE — PLAN OF CARE
Problem: Behavioral Health Plan of Care  Goal: Adheres to Safety Considerations for Self and Others  Intervention: Develop and Maintain Individualized Safety Plan  Recent Flowsheet Documentation  Taken 12/1/2021 0229 by Mildred Mann, RN  Safety Measures:   environmental rounds completed   safety rounds completed     Problem: Sleep Disturbance  Goal: Adequate Sleep/Rest  Outcome: Improving      Patient had a good night, slept through the night. Safety checks maintained throughout shift. No concerns noted. She had 7 hrs of sleep.

## 2021-12-01 NOTE — PLAN OF CARE
"Assessment/Intervention/Current Symptoms and Care Coordination  Writer met with the patient to check in and consulted with psychiatrist. Bibiana reports having read the information given to her about [facility] and indicated that the services will be beneficial to her.Writer assisted with setting up Team interview with  [facility]. Writer commended her on her ability to answer their questions fully. She reports feeling \"good\" about the interview. [facility] will respond within a day regarding admission. Writer assisted with questions regarding forms to send medical records to the Social Security Office to apply for SSDI. Writer provided confirmation of [employer] insurance being active on website. Writer will connect with IRTS on whether they have made a decision on acceptance and date/time for anticipated admission.  Pt had no further questions or concerns at this time.      Discharge Plan or Goal  [facility] facility     Barriers to Discharge   Symptom stabilization, Medication Management, Care Coordination, court hold     Referral Status  Referred to [facility] ( 11/29) and [facility](not taking pts due to COVID) and [facility] (on waitlist 2-3 weeks) [employer] ([facility] 11/29)  [facility] : Laura Benítez [phone] [email or web address]     Interested in resources including applying for social security, finding a supportive living environment, and job assistance. Community [facility] to assist.      Legal Status  Per Legal :   Patient agreed to a Stayed Order for Commitment and Treatment with Neuroleptic Medications in   [facility] Probate Court.  Patient signed waivers on November 19, 2021.  File number [id number]  This is effective  November 19, 2021 and continued to May 19, 2022 unless otherwise directed by the court.  Patient agreed to be committed to the head of [facility] and Commissioner of Human Services and would obtain voluntary treatment under " a Stayed Order of Commitment.  Copy of order received.  A Change of Status to be completed upon discharge.

## 2021-12-01 NOTE — PLAN OF CARE
Problem: Behavioral Health Plan of Care  Goal: Adheres to Safety Considerations for Self and Others  Outcome: Improving   There has been no behavior problem. Pt remains calm.  Problem: Activity and Energy Impairment (Anxiety Signs/Symptoms)  Goal: Optimized Energy Level (Anxiety Signs/Symptoms)  Outcome: Improving   Pt denies anxiety.   Problem: Cognitive Impairment (Depressive Signs/Symptoms)  Goal: Optimized Cognitive Function (Depressive Signs/Symptoms)  Outcome: No Change   Pt rates her depression 4/10. She denies all other psych symptoms.  Problem: Pain Chronic (Persistent)  Goal: Acceptable Pain Control and Functional Ability  Outcome: Improving   Pt denies pain.  She has been has been visible on the unit with little interaction with peer. She red her book on the unit. She eats supper and HS snack with appetite. She hasbathroom privilege. Pt denies having any complaint.

## 2021-12-01 NOTE — PLAN OF CARE
Patient admits to depression 3/10, anxiety 3/10, denies pain and SI/HI and is able to contract for safety and looks forward to discharge on 12-3-21. Continue to encourage patient to attend group activities and engage others in positive conversations. Patient has a very pleasant demeanor.   Problem: Behavioral Health Plan of Care  Goal: Plan of Care Review  Outcome: No Change  Flowsheets  Taken 12/1/2021 1412  Plan of Care Reviewed With: patient  Progress: improving  Patient Agreement with Plan of Care: agrees  Taken 12/1/2021 1300  Plan of Care Reviewed With: patient  Patient Agreement with Plan of Care: agrees  Note: Patient will discharge to [facility] facility on Friday 12-3-21     Problem: Behavioral Health Plan of Care  Goal: Adheres to Safety Considerations for Self and Others  Intervention: Develop and Maintain Individualized Safety Plan  Recent Flowsheet Documentation  Taken 12/1/2021 1300 by Josesito Daniel, RN  Safety Measures: environmental rounds completed     Problem: Behavioral Health Plan of Care  Goal: Absence of New-Onset Illness or Injury  Intervention: Identify and Manage Fall Risk  Recent Flowsheet Documentation  Taken 12/1/2021 1300 by Josesito Daniel RN  Safety Measures: environmental rounds completed
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
## 2021-12-01 NOTE — PLAN OF CARE
Problem: Behavioral Health Plan of Care  Goal: Adheres to Safety Considerations for Self and Others  Outcome: Improving  Intervention: Develop and Maintain Individualized Safety Plan  Recent Flowsheet Documentation  Taken 11/30/2021 1700 by Viri Hagen RN  Safety Measures: environmental rounds completed    Problem: Behavioral Health Plan of Care  Goal: Plan of Care Review  Recent Flowsheet Documentation  Taken 11/30/2021 1700 by Viri Hagen RN  Plan of Care Reviewed With: patient  Goal: Absence of New-Onset Illness or Injury  Intervention: Identify and Manage Fall Risk  Recent Flowsheet Documentation  Taken 11/30/2021 1700 by Viri Hagen RN  Safety Measures: environmental rounds completed     Problem: Activity and Energy Impairment (Anxiety Signs/Symptoms)  Goal: Optimized Energy Level (Anxiety Signs/Symptoms)  Intervention: Optimize Energy Level  Recent Flowsheet Documentation  Taken 11/30/2021 1700 by Viri Hagen RN  Diversional Activity: television     Problem: Mood Impairment (Depressive Signs/Symptoms)  Goal: Improved Mood Symptoms (Depressive Signs/Symptoms)  Intervention: Promote Mood Improvement  Recent Flowsheet Documentation  Taken 11/30/2021 1700 by Viri Hagen RN  Diversional Activity: television   Pt. Calm, and cooperative, pleasant on approach, visible on the unit most of the shift partially engaged with peers. Staff helped pt. Look up information for her disability paperwork, unanswered question where written down for . Pt. States she trying to get off Seroquel, and had been doing a lot better lately. Endorses anxiety 3, depression 5, PRN gabapentin effective, denies pain SI/HI and hallucinations, contracted for safety.

## 2021-12-02 ENCOUNTER — TELEPHONE (OUTPATIENT)
Dept: BEHAVIORAL HEALTH | Facility: CLINIC | Age: 21
End: 2021-12-02
Payer: MEDICAID

## 2021-12-02 LAB — SARS-COV-2 RNA RESP QL NAA+PROBE: NEGATIVE

## 2021-12-02 PROCEDURE — 87635 SARS-COV-2 COVID-19 AMP PRB: CPT | Performed by: PSYCHIATRY & NEUROLOGY

## 2021-12-02 PROCEDURE — 99232 SBSQ HOSP IP/OBS MODERATE 35: CPT | Performed by: PSYCHIATRY & NEUROLOGY

## 2021-12-02 PROCEDURE — 250N000013 HC RX MED GY IP 250 OP 250 PS 637: Performed by: NURSE PRACTITIONER

## 2021-12-02 PROCEDURE — 128N000001 HC R&B CD/MH ADULT

## 2021-12-02 PROCEDURE — 250N000013 HC RX MED GY IP 250 OP 250 PS 637: Performed by: PSYCHIATRY & NEUROLOGY

## 2021-12-02 RX ORDER — DIVALPROEX SODIUM 500 MG/1
1000 TABLET, EXTENDED RELEASE ORAL AT BEDTIME
Qty: 60 TABLET | Refills: 1 | Status: SHIPPED | OUTPATIENT
Start: 2021-12-02 | End: 2021-12-28

## 2021-12-02 RX ORDER — QUETIAPINE FUMARATE 200 MG/1
400 TABLET, FILM COATED ORAL AT BEDTIME
Qty: 60 TABLET | Refills: 1 | Status: SHIPPED | OUTPATIENT
Start: 2021-12-02 | End: 2021-12-17 | Stop reason: DRUGHIGH

## 2021-12-02 RX ADMIN — DIVALPROEX SODIUM 500 MG: 250 TABLET, FILM COATED, EXTENDED RELEASE ORAL at 20:16

## 2021-12-02 RX ADMIN — OLANZAPINE 10 MG: 10 TABLET, FILM COATED ORAL at 16:34

## 2021-12-02 RX ADMIN — DIVALPROEX SODIUM 500 MG: 250 TABLET, FILM COATED, EXTENDED RELEASE ORAL at 08:01

## 2021-12-02 RX ADMIN — CHOLECALCIFEROL (VITAMIN D3) 10 MCG (400 UNIT) TABLET 10 MCG: at 08:01

## 2021-12-02 RX ADMIN — QUETIAPINE FUMARATE 400 MG: 200 TABLET ORAL at 20:16

## 2021-12-02 ASSESSMENT — ACTIVITIES OF DAILY LIVING (ADL)
ORAL_HYGIENE: INDEPENDENT
DRESS: SCRUBS (BEHAVIORAL HEALTH)
HYGIENE/GROOMING: INDEPENDENT

## 2021-12-02 NOTE — TELEPHONE ENCOUNTER
This coordinator received referral and routed it to TC RN to follow up with med management per David.       ----- Message from CARLIE Houston sent at 12/2/2021  1:31 PM CST -----  Regarding: transition clinic referral  Contact: [phone]  Transition Clinic Referral     Type of Referral: Medication Mgt/psychiatry      _____Therapy    _____Therapy & Medication (Therapy will be scheduled first)  _____Medication Only    Referring Provider Name: CARLIE Houston    Clinician completing the assessment. DIMITRI Houston    Referring Provider Contact Phone Number: Dr. Thien Sherman    Reason for Transition Clinic Referral: Pt needs med mgt/psychiatry appt. Within 30 days of discharge from [facility]    Next Level of Care Patient Will Be Transitioned To: [facility] [address] [place] [address]  [phone]     Start Date for Next Level of Care (Required): 12/3/2021    Type of Clinical Assessment Completed (Crisis, DA, BEAU, etc.): H & P    Date Clinical Assessment was Completed: 11/10/2021    Diagnosis: Bipolar I disorder, current or most recent episode manic, with psychotic features     What Would Be Helpful from the Transition Clinic: Med mgt appt to bridge gap in care. Appt. Scheduled with Viri Hampton , January 18 at 9:30 AM at    [address], [address]  [place], [place] [id number] [phone]  Fax: [phone]      Needs: NO    Does Patient Have Access to Technology: Yes    Patient E-mail Address: No e-mail address on record    Current Patient Phone Number: [phone];     Clinician Gender Preference (if applicable): YES: female  DIMITRI Houston

## 2021-12-02 NOTE — PLAN OF CARE
Patient denies SI/HI and contracts for safety at this time, continues to admit depression 3/10 and anxiety 3/10 d/t to pending discharge. Patient is able to contract for safety and interacts appropriately with peers and staff. We continue to encourage patient to seek out if feelings change. Patient continues to need encouragement to attend groups and not isolate to room     Problem: Behavioral Health Plan of Care  Goal: Plan of Care Review  Outcome: Improving  Flowsheets (Taken 12/2/2021 1403)  Plan of Care Reviewed With: patient  Progress: improving  Patient Agreement with Plan of Care: agrees

## 2021-12-02 NOTE — PROGRESS NOTES
12/02/21 1038   Engagement   Intervention Group   Topic Detail Card game for turn taking, leisure, sequencing, following instructions, and coping with sx through distraction   Attendance Did not attend   Reason for Not Attending Excused  (Pt was meeting with social work in regard to discharge)   Supervision   Supervision On-site supervision provided

## 2021-12-02 NOTE — TELEPHONE ENCOUNTER
Mental Health &Addiction (MH&A)Transition Clinic (TC):     Provides Patient Support While Waiting to Access Programmatic and Outpatient MH&A Care and Provides Select Crisis Assessment Services     NURSING Referral Review  _________________________________________    This RN has reviewed this Medication Management referral to the Transition Clinic and deemed the referral   [x] Appropriate  [] Inappropriate     Based on the following criteria:    Pt has medication management plan (or pending plan) in place for future prescribing: Yes: January 18 at 9:30 AM at    [facility]     Timeframe until pt's scheduled psychiatry appointment is less than 6 months: Yes: About a month after discharge    Pt takes psychiatric medications: Yes: Seroquel, gabapentin, depakote     Pt's goals seem to align with this temporary service: Yes: going to IRTS after hx     Any additional pertinent information regarding this referral: spoke to referral source, scheduled for video on 12/17 at 10:30a.m. & SW reported she would put this in discharge summary - pt discharge to IRTS tomorrow so TC will see her 2 weeks after discharge per best practice.    Tisha Arroyo RN on December 2, 2021 at 3:26 PM

## 2021-12-02 NOTE — PROGRESS NOTES
12/02/21 1529   Engagement   Intervention Group   Topic Detail OT: Creative endeavor (window clings) to promote healthy liesure exploration, coping through distraction, direction following, planning, and concentration.   Attendance Attended   Patient Response Demonstrated understanding of materials provided;Asked questions and/or took notes;Was respectful;Contributes to conversation  (Pt answered check-in question sharing she likes to relax by playing the piano/guitar and recording music.)   Concentrated on Task duration of group   Cognition Goal-directed;Takes initiative;Follows through with task   Mood/Affect Pleasant   Social/Behavioral Engaged;Cooperative   Thought Content Reality oriented   Goals addressed in session today Pt engaged in group and reported enjoying the activity. Even after writer shared the projects would be locked up pt asked if she could keep it in her room, but accpeted writer's answer (no).   Supervision   Supervision On-site supervision provided
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
## 2021-12-02 NOTE — DISCHARGE INSTRUCTIONS
Behavioral Discharge Planning and Instructions    Summary: You were admitted on 11/9/2021  due to psychosis.  You were treated by psychiatry and discharged on 12/3/2021 from [facility] to [facility].    Main Diagnosis: Bipolar I disorder, current or most recent episode manic, with psychotic features     Health Care Follow-up:    You have been accepted to:  [facility]  [address] [place] [address]  [phone]           Since your community psychiatry appointment is more than one month from date of your discharge, you have been referred to:  [facility] at [facility]  [address] [place] [address]  Phone: [phone]  A check in appointment is scheduled for:  Friday December 17th, 2021 at 10:30am.  Note: This is a video appointment.  They can provide support and a psychiatry/medication management appt. while you are waiting to get into [facility].      Appointment Type: Medication Management/psychiatry  Provider: Viri Hampton  Date & Time: January 18 at 9:30 AM  Clinic:   [facility], [address] [id number] [phone]  Fax: [phone]  Note: Please arrive 1/2 hour early to appointment to fill out paperwork. This will be a 75 minute session. Cancellation policy: must cancel 24 hrs prior to appointment.     Appointment Type: Medication Management/psychiatry  Provider: Viri Hampton  Date & Time: Tuesday February 15, 2021 at 12:05pm  Arrive 10 mins early for check in  Clinic:   [facility], [address] [place] [address]  [phone]  Fax: [phone]  Note: This is a in person 25 minute followup appointment.     Medical Follow-up appointment: Dr. Farida Grimes on Tuesday December 14th at 11:10AM for an 11:30AM apppointment.   Address: [address] [place] [address]. [place] [address]   Phone: [phone]  Bring the following to your  appointment: all medications, photo id, insurance card, co-pay (if applicable) and discharge paper work from hospital to appt. If you need to change or cancel appt, please call your clinic.        Attend all scheduled appointments with your outpatient providers. Call at least 24 hours in advance if you need to reschedule an appointment to ensure continued access to your outpatient providers.     Major Treatments, Procedures and Findings:  You were provided with: a psychiatric assessment, assessed for medical stability, medication evaluation and/or management, group therapy and milieu management    Symptoms to Report: increased confusion, losing more sleep, mood getting worse or thoughts of suicide    Early warning signs can include: increased depression or anxiety sleep disturbances increased thoughts or behaviors of suicide or self-harm  increased unusual thinking, such as paranoia or hearing voices    Safety and Wellness:  Take all medicines as directed.  Make no changes unless your doctor suggests them.      Follow treatment recommendations.  Refrain from alcohol and non-prescribed drugs.  If there is a concern for safety, call [phone].    Resources:   [facility]  assigned: Cleo Nguyen [phone] [email or web address].    Crisis Intervention: 119.938.9604 or 131-173-7009 (TTY: 456.713.3532).  Call anytime for help.  [employer] [place] [employer]ness (www.[place].org): 501.991.9870 or 885-349-3896.  [facility] Crisis Response - Adult [phone]    General Medication Instructions:   See your medication sheet(s) for instructions.   Take all medicines as directed.  Make no changes unless your doctor suggests them.   Go to all your doctor visits.  Be sure to have all your required lab tests. This way, your medicines can be refilled on time.  Do not use any drugs not prescribed by your doctor.  Avoid alcohol.    Advance Directives:   Scanned document on file with [facility]? No scanned doc  Is  document scanned? Pt states no documents  Honoring Choices Your Rights Handout: Informed and given  Was more information offered? Pt declined    The Treatment team has appreciated the opportunity to work with you. If you have any questions or concerns about your recent admission, you can contact the unit which can receive your call 24 hours a day, 7 days a week. They will be able to get in touch with a Provider if needed. The unit number is [phone] .

## 2021-12-02 NOTE — PLAN OF CARE
Assessment/Intervention/Current Symptoms and Care Coordination  Writer met with the patient to check in and consulted with psychiatrist. Pt is looking forward to discharging to [facility] tomorrow. She provided details for documents requested by the [facility] ,had a COVID test, and signed ROIs for writer to schedule a followup psychiatry appt. in preparation for discharge tomorrow. She will call her  today to do LOCUS for [facility] admission. Writer assisted in answering questions related to social security. Writer recommended she call the social securtiy office to see if her medical records need to be sent directly from each hospital she had been in. Pt had no further questions or concerns at this time.      Discharge Plan or Goal  [facility]TS facility     Barriers to Discharge   Symptom stabilization, Medication Management, Care Coordination   Referral Status  Referred to [facility] ( 11/29) and [facility](not taking pts due to COVID) and [facility] ([facility] (on waitlist 2-3 weeks) [facility] ([facility] 11/29)  [facility] : Laura Benítez [phone] [email or web address]     Interested in resources including applying for social security, finding a supportive living environment, and job assistance. Community CM to assist.      Legal Status  Per Legal :   Patient agreed to a Stayed Order for Commitment and Treatment with Neuroleptic Medications in   [facility]ate Court.  Patient signed waivers on November 19, 2021.  File number [id number]  This is effective  November 19, 2021 and continued to May 19, 2022 unless otherwise directed by the court.  Patient agreed to be committed to the head of [facility] and Commissioner of Human Services and would obtain voluntary treatment under a Stayed Order of Commitment.  Copy of order received.  A Change of Status to be completed upon discharge

## 2021-12-02 NOTE — PROGRESS NOTES
12/02/21 1300   Engagement   Intervention Group   Topic Detail Process Group: Social Support   Attendance Attended   Reason for Not Attending Excused   Patient Response Demonstrated understanding of materials provided;Asked questions and/or took notes;Expressed feelings/issues;Demonstrated leadership   Concentrated on Task duration of group   Cognition Goal-directed   Mood/Affect Pleasant   Social/Behavioral Cooperative;Engaged   Thought Content Reality oriented     GROUP LENGTH (MINS):   20 m   RELEVANT PRIMARY DIAGNOSIS: Patient Active Problem List   Diagnosis     Psychosis (H)     Bipolar I disorder, current or most recent episode manic, with psychotic features (H)     Vaginal pain     PTSD (post-traumatic stress disorder)        TREATMENT MODALITY:      []CBT       []DBT       []ACT       []Interpersonal psychotherapy                                 []Psychoeducational therapy       [x]Skill development       []Other:        ATTENDANCE:        [x]Stayed for entire group     []Arrived late    [] Left early       # OF PATIENTS IN GROUP: 2   BILLABLE:     []Yes            [x]No   Notes:

## 2021-12-03 VITALS
HEART RATE: 82 BPM | OXYGEN SATURATION: 98 % | BODY MASS INDEX: 23.6 KG/M2 | TEMPERATURE: 97.9 F | WEIGHT: 174.2 LBS | DIASTOLIC BLOOD PRESSURE: 69 MMHG | HEIGHT: 72 IN | RESPIRATION RATE: 18 BRPM | SYSTOLIC BLOOD PRESSURE: 126 MMHG

## 2021-12-03 PROCEDURE — 250N000013 HC RX MED GY IP 250 OP 250 PS 637: Performed by: NURSE PRACTITIONER

## 2021-12-03 PROCEDURE — 99239 HOSP IP/OBS DSCHRG MGMT >30: CPT | Performed by: PSYCHIATRY & NEUROLOGY

## 2021-12-03 PROCEDURE — 250N000013 HC RX MED GY IP 250 OP 250 PS 637: Performed by: PSYCHIATRY & NEUROLOGY

## 2021-12-03 RX ADMIN — CHOLECALCIFEROL (VITAMIN D3) 10 MCG (400 UNIT) TABLET 10 MCG: at 08:20

## 2021-12-03 RX ADMIN — DIVALPROEX SODIUM 500 MG: 250 TABLET, FILM COATED, EXTENDED RELEASE ORAL at 08:20

## 2021-12-03 ASSESSMENT — ACTIVITIES OF DAILY LIVING (ADL)
ORAL_HYGIENE: INDEPENDENT
HYGIENE/GROOMING: INDEPENDENT
DRESS: SCRUBS (BEHAVIORAL HEALTH)

## 2021-12-03 NOTE — PLAN OF CARE
Problem: Relapse Prevention  Goal: Engage in OT Group  Description: Target: Patient will attend and engage in at least 1 OT group daily.  Outcome: Completed   Occupational Therapy Discharge Note    Patient Name:  Bibiana Andino    D: Refer to daily doc flowsheets for details of progress toward goals.  A: Improvement seen in symptom management and engagement.   P: Patient discharging to [facility] facility. Discontinue OT Care Plan goals and interventions.    Date: 12/3/2021  Time: 10:26 AM
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
## 2021-12-03 NOTE — PLAN OF CARE
Discharge plan or goal: [facility]    Today's Plan: Writer met with Bibiana and consulted with psychiatrist. Bibiana reports having slept well and has met with the psychiatrist this morning. She presents with pleasant affect and appears cheerful. She asked writer to mail requests for medical records, and expressed gratitude toward writer. Writer reviewed upcoming appointments. Writer sent discharge paper work to [facility] and a Change of Status to [facility].     Pt has the following outpatient appointment(s) scheduled:  Pt has been accepted on 12/3/2021 for admissions to:  [facility]  [address] [place] [address]  [phone]          Since your community psychiatry appointment is more than one month from date of your discharge, you have been referred to:  Mental  Health and Addiction Transition Clinic at [facility]  [address] [place] [address]  Phone: [phone]  A check in appointment is scheduled for:  Friday December 17th, 2021 at 10:30am.  Note: This is a video appointment.  They can provide support and a psychiatry/medication management appt. while you are waiting to get into [facility].       Appointment Type: Medication Management/psychiatry  Provider: Viri Hampton  Date & Time: January 18 at 9:30 AM  Clinic:   [facility]-[address], [address] [place] [address]  [phone]  Fax: [phone]  Note: Please arrive 1/2 hour early to appointment to fill out paperwork. This will be a 75 minute session. Cancellation policy: must cancel 24 hrs prior to appointment.      Appointment Type: Medication Management/psychiatry  Provider: Viri Hampton  Date & Time: Tuesday February 15, 2021 at 12:05pm  Arrive 10 mins early for check in  Clinic:   [facility]  [address] [facility], [address] [place] [id number] [phone]  Fax: [phone]  Note: This is a in person 25 minute followup appointment.       Medical Follow-up appointment: Dr. Farida Grimes on Tuesday December 14th at 11:10AM for an 11:30AM apppointment.   Address: [address] [place] [address]. [place] [address]   Phone: [phone]  Bring the following to your appointment: all medications, photo id, insurance card, co-pay (if applicable) and discharge paper work from hospital to appt. If you need to change or cancel appt, please call your clinic.     Pt has the following professional community support(s):   [facility]  assigned: Cleo Nguyen [phone], FAX: [phone] [email or web address].    Legal Status: Stay of commitment    Diagnosis/Procedure:   Patient Active Problem List   Diagnosis     Psychosis (H)     Bipolar I disorder, current or most recent episode manic, with psychotic features (H)     Vaginal pain     PTSD (post-traumatic stress disorder)       Care Rounds Attendance:   BING   RN   Charge RN   OT/TR  MD/CNP    CARLIE Houston [facility], 12/3/2021, 9:31 AM

## 2021-12-03 NOTE — PROGRESS NOTES
Explained all discharge medications to patients along with appointments and patient acknowledged understanding of information given. Patient also denied depression 0/10,  but rated anxiety 3/10, but stated she  felt safe and was able to contract for safety. Patient was escorted to cab via wheelchair.

## 2021-12-03 NOTE — PROGRESS NOTES
12/03/21 1025   Engagement   Intervention Group   Topic Detail OT Wellness Group-Bocce ball   Attendance Did not attend   Reason for Not Attending Excused  (discharging)

## 2021-12-03 NOTE — PLAN OF CARE
Patient denies depression, admits to anxiety 3/10 d/t discharge to IRTS facility today and having to adjust to new surroundings. Patient denies pain and is able to contract for safety with denial of SI/HI. Patient remains pleasant and cooperative  while being med compliant  Problem: Behavioral Health Plan of Care  Goal: Plan of Care Review  Flowsheets (Taken 12/3/2021 0768)  Plan of Care Reviewed With: patient  Progress: improving  Patient Agreement with Plan of Care: agrees

## 2021-12-03 NOTE — PLAN OF CARE
Problem: Behavioral Health Plan of Care  Goal: Adheres to Safety Considerations for Self and Others  Outcome: Improving   Pt remains safer. She does not engage in any unsafe behavior.   Problem: Cognitive Impairment (Anxiety Signs/Symptoms)  Goal: Optimized Cognitive Function (Anxiety Signs/Symptoms)  Outcome: Improving   Pt endorse anxiety 6/10. She receives Zyprexa at 1634. She has been calmer.   Problem: Activity and Energy Impairment (Depressive Signs/Symptoms)  Goal: Optimized Energy Level (Depressive Signs/Symptoms)  Outcome: Improving   Pt denies depression and all other psych symptoms.   Problem: Cognitive Impairment (Depressive Signs/Symptoms)  Goal: Optimized Cognitive Function (Depressive Signs/Symptoms)  Outcome: Improving   There has been no evidence of cognitive impairment. Pt is alert and oriented. She denies pain. She continues to be calm and compliant with med and cares. She has bathroom privilege. Pt states that she is very excited about her discharge tomorrow. Q 15 minutes safety checks is ongoing. Pt is now sleeping in bed with nurse call button within reach. The writer is continuing to  monitor and assist pt as needed.

## 2021-12-03 NOTE — PLAN OF CARE
Problem: Behavioral Health Plan of Care  Goal: Adheres to Safety Considerations for Self and Others  Intervention: Develop and Maintain Individualized Safety Plan  Recent Flowsheet Documentation  Taken 12/3/2021 0130 by Mildred Mann, RN  Safety Measures:    environmental rounds completed    safety rounds completed     Problem: Sleep Disturbance  Goal: Adequate Sleep/Rest  Outcome: Improving      Patient slept through the night. No concern noted/reported. Safety checks completed Q 15 min's per unit protocol. She had 7 hrs of sleep.

## 2021-12-12 NOTE — PATIENT INSTRUCTIONS
I kindly request that you check your MyChart prior to all appointments with me and complete any assigned questionnaires ahead of time.      If you have not already done so, I encourage you to sign up for Mychart ([email or web address]).  This will allow you to review your results, securely communicate with your care team, and schedule virtual visits as well.    FYI:  I do virtual (video) visits exclusively on Wednesdays.  I still do in-person visits at [facility] ([phone]) on Mondays, Tuesdays and Thursdays.  You can schedule a video visit for many conditions.  Please follow this link:  [email or web address]    To schedule any ordered imaging studies (including mammogram) you can call [facility] Scheduling at [phone].  If you are scheduling a DEXA (bone density) scan please do NOT schedule this at the [facility].  Please ask that it be done at [facility] in [place].  Other preferred DEXA locations include [facility] (at the [facility]), [facility], or [facility].

## 2021-12-14 ENCOUNTER — OFFICE VISIT (OUTPATIENT)
Dept: FAMILY MEDICINE | Facility: CLINIC | Age: 21
End: 2021-12-14
Payer: MEDICAID

## 2021-12-14 VITALS
TEMPERATURE: 98.1 F | DIASTOLIC BLOOD PRESSURE: 71 MMHG | WEIGHT: 189 LBS | BODY MASS INDEX: 25.63 KG/M2 | HEART RATE: 84 BPM | OXYGEN SATURATION: 98 % | SYSTOLIC BLOOD PRESSURE: 118 MMHG

## 2021-12-14 DIAGNOSIS — Z71.1 CONCERN ABOUT FEMALE GENITAL DISEASE WITHOUT DIAGNOSIS: ICD-10-CM

## 2021-12-14 DIAGNOSIS — Z23 HIGH PRIORITY FOR 2019-NCOV VACCINE: ICD-10-CM

## 2021-12-14 DIAGNOSIS — F31.2 BIPOLAR I DISORDER, CURRENT OR MOST RECENT EPISODE MANIC, WITH PSYCHOTIC FEATURES (H): Primary | ICD-10-CM

## 2021-12-14 PROCEDURE — 0001A COVID-19,PF,PFIZER (12+ YRS): CPT | Performed by: FAMILY MEDICINE

## 2021-12-14 PROCEDURE — 91300 COVID-19,PF,PFIZER (12+ YRS): CPT | Performed by: FAMILY MEDICINE

## 2021-12-14 PROCEDURE — 99203 OFFICE O/P NEW LOW 30 MIN: CPT | Performed by: FAMILY MEDICINE

## 2021-12-14 PROCEDURE — 96127 BRIEF EMOTIONAL/BEHAV ASSMT: CPT | Performed by: FAMILY MEDICINE

## 2021-12-14 RX ORDER — MULTIVIT WITH MINERALS/LUTEIN
2000 TABLET ORAL DAILY
COMMUNITY
Start: 2021-12-14 | End: 2023-11-16
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
## 2021-12-17 ENCOUNTER — VIRTUAL VISIT (OUTPATIENT)
Dept: BEHAVIORAL HEALTH | Facility: CLINIC | Age: 21
End: 2021-12-17
Attending: NURSE PRACTITIONER
Payer: MEDICAID

## 2021-12-17 DIAGNOSIS — F43.10 PTSD (POST-TRAUMATIC STRESS DISORDER): ICD-10-CM

## 2021-12-17 DIAGNOSIS — F31.9 BIPOLAR I DISORDER (H): Primary | ICD-10-CM

## 2021-12-17 PROCEDURE — 99443 PR PHYSICIAN TELEPHONE EVALUATION 21-30 MIN: CPT | Performed by: NURSE PRACTITIONER

## 2021-12-17 RX ORDER — QUETIAPINE FUMARATE 50 MG/1
50 TABLET, FILM COATED ORAL AT BEDTIME
Qty: 7 TABLET | Refills: 0 | Status: SHIPPED | OUTPATIENT
Start: 2021-12-17 | End: 2021-12-28

## 2021-12-17 RX ORDER — QUETIAPINE FUMARATE 300 MG/1
300 TABLET, FILM COATED ORAL AT BEDTIME
Qty: 30 TABLET | Refills: 1 | Status: SHIPPED | OUTPATIENT
Start: 2021-12-17 | End: 2021-12-28

## 2021-12-17 ASSESSMENT — ANXIETY QUESTIONNAIRES
1. FEELING NERVOUS, ANXIOUS, OR ON EDGE: SEVERAL DAYS
5. BEING SO RESTLESS THAT IT IS HARD TO SIT STILL: NOT AT ALL
7. FEELING AFRAID AS IF SOMETHING AWFUL MIGHT HAPPEN: NOT AT ALL
3. WORRYING TOO MUCH ABOUT DIFFERENT THINGS: SEVERAL DAYS
4. TROUBLE RELAXING: NOT AT ALL
2. NOT BEING ABLE TO STOP OR CONTROL WORRYING: NOT AT ALL
GAD7 TOTAL SCORE: 3
6. BECOMING EASILY ANNOYED OR IRRITABLE: SEVERAL DAYS

## 2021-12-17 ASSESSMENT — PATIENT HEALTH QUESTIONNAIRE - PHQ9: SUM OF ALL RESPONSES TO PHQ QUESTIONS 1-9: 3

## 2021-12-17 NOTE — Clinical Note
Zeeshan Mccarty has an upcoming appointment with you on 1/20/2022.    1.      She needs to get on birth control due to BBW surrounding fetal risk/congential malformations while on Depakote.    2.    She noted some recent facial acne which she's never really had before since being on the Seroquel + Depakote.     I told her she could discuss with you OCP options which might also manage the acne.    Thank you for your coordination in care.    Sincerely,    Sylvia WOODRUFF-CNP

## 2021-12-17 NOTE — PROGRESS NOTES
" This video/telephone visit will be conducted virtually between you and your provider. We have found that certain health care needs can be provided without the need for an in-person physical exam. This service lets us provide the care you need with a video /telephone conversation. If a prescription is necessary we can send it directly to your pharmacy. If lab work is needed we can place an order for that and you can then stop by our lab to have the test done at a later time.\"   Just as we bill insurance for in-person visits, we also bill insurance for video/telephone visits. If you have questions about your insurance coverage, we recommend that you speak with your insurance company.      Patient has given verbal consent for video/Telephone visit? yes    Wtr attempted to call pt's cell phone several times but did not get through, called [facility] [phone] & spoke to staff, Jeanne, who sorted this out - will need to switch to telephone    Patient would like the video visit invitation sent by: Text to cell phone    Patient verified allergies, medications and pharmacy via phone. Patient states they are ready for visit.    Tisha Arroyo RN on December 17, 2021 at 10:22 AM     Mental Health &Addiction (MH&A)Transition Clinic (TC):     Provides Patient Support While Waiting to Access Programmatic and Outpatient MH&A Care and Provides [facility] Crisis Assessment Services     NURSING INTAKE  ____________________________________________________    \"The Transition Clinic is a temporary psychiatry service that helps to bridge the time to your next appointment. It is not intended to be a long-term service and you are expected to attend your scheduled appointment with your next provider.\"  [x] Patient verbalized understanding    If you need support between appointments, please call [phone] and let them know you're seen in the Transition Clinic.    General-     Most pressing MH needs at this time: pt " "reports \"looking to get a therapist who is CBT/ trauma trained\" reports meds are \"going ok... possibly wean off the seroquel\"      Any physical health conditions or diagnoses we should be aware of or that are impacting you:          Medications-     Injectable medications currently prescribed: no   If yes, do you need an appointment for the next injection:     Any Controlled Substances that you are prescribed: no  Seroquel  Depakote      Primary care provider: Physician No Ref-Primary        RASHEL-7 scores: 3  PHQ-9 scores: 3      Anything the provider should be aware of for today's appointment: wants to talk about weaning off the seroquel    New (awaiting) Mental health provider or next programming: Yaquelin      Date of scheduled apt: 1/18/22   Days until this apt: 31        Tisha Arroyo RN on December 17, 2021 at 10:22 AM     "

## 2021-12-17 NOTE — PATIENT INSTRUCTIONS
Start:  Seroquel 350 mg for 7 nights, then 300 mg at bedtime thereafter      Continue:  Depakote ER 1000 mg at bedtime

## 2021-12-18 ASSESSMENT — ANXIETY QUESTIONNAIRES: GAD7 TOTAL SCORE: 3

## 2021-12-20 NOTE — PROGRESS NOTES
MH&A TC   NURSING Post-Appointment Chart-check:    Correct pharmacy verified with patient and updated in chart? [x] yes []no    Charge captured ? [x] yes  [] no    Medications ordered this visit were e-scribed.  Verified by order class [x] yes  [] no    List Medications:  QUEtiapine (SEROQUEL) 50 MG   QUEtiapine (SEROQUEL) 300 MG   E-Prescribing Status: Receipt confirmed by pharmacy (12/17/2021 11:26 AM CST)    Medication changes or discontinuations were communicated to patient's [facility]: [x] yes  [] no    This RN called [facility] nurse, Lyle, who requested paperwork & orders faxed to  [phone], wtr faxed them at 10:47am.    UA collected [] yes  [] no  [x] n/a-virtual     Future appointment was made: [] yes  [] no  [x] n/a    Dictation completed at time of chart check: [x] yes  [] no    I have checked the documentation for today s encounters and the above information has been reviewed and completed.      Tisha Arroyo, RN on December 20, 2021 at 10:38 AM

## 2021-12-27 NOTE — PROGRESS NOTES
[facility]      Mental Health & Addiction Service Line    Transition Clinic: Psychiatry Progress Note  Medication Management                    VISIT INFORMATION      Date:  2021       Number:  -2nd      Referral source:  -Inpatient hospitalization      Patient Identifying Information:  Legal name: Bibiana Andino  Preferred name: Bibiana  : 2000  Preferred pronouns:  She/her      Participants:   -Patient  -Provider  -Provider spoke with I[facility] staff for 3 to 4 minutes prior to speaking with patient for appt        Telehealth visit details:  Patient location:   [facility]  Provider Location: [facility]  Platform utilized:  Telephone encounter    Start time: 10:43 am  End time:  11:14 am          INTERIM HX      Per IRTS staff:  -Taking medications consistently  -Actively participating in the programming/engaged  -Making progress but still have bouts of depression      Per Patient:  -I kind of exaggerated how depressed I am ... it's hard to describe  -Will get down or mood will be lower for 1 day, but not more than that          PSYCHIATRIC ROS      Sleep:  -10 to 12 hours, taking daytime naps 50% of the week  -Energy levels are still a 4 or 5/10        Trauma and or PTSD:  -Did not discuss in detail during today's appointment      See the following encounters for further details:     - to 10/1/2021, [facility]     - to 12/3/2021, [facility]        Mood/Anxiety:    -See above    -See PHQ-9 score    -See RASHEL-7 score      Ellie/Hypomania:    Denies in the interim hx:  -Need for less sleep with an increase in goal directed activities  -Inflated self esteem, grandiose thoughts  -Increased spending or gambling  -Engaging in high risk behaviors  -Excessive feelings of irritability or impulsivity      Psychosis:    Denies in the interim hx:  -AH/VH  -Paranoia   -Thought insertion or broadcasting      Suicidal ideations:  +Fleeting passive  thoughts ... but I don't want to act on them in any way        SIB:  -Denies engaging in self harm         Side effects:  -Weight gain has plateau'd since starting the Depakote + Seroquel    -Still having some facial acne but now skin is also dry        PSYCHIATRIC HISTORY      Suicide attempts:  -Multiple  -Most recent: 7/2021         Inpatient psychiatric hospitalizations:  -3 [facility], possibly more  -Most recent: [facility] from 11/9 to 12/3/2021 ... signed a stay of commitment             Medication Trials:     Mood stabilizers:  -Lithium 600 mg BID     Antipsychotics:  -Zyprexa 5 mg            CURRENT SUBSTANCE USE      Alcohol:   -None reported         Recreational Drugs:   -None reported         Medical Marijuana:  -None reported         Cigarettes per day:   -None reported         Chewing tobacco:   -None reported         Vaping:    -None reported         Caffeine intake:    -Did not ask          MEDICAL HISTORY    -The problem list was reviewed via the EMR prior to the appointment    -The patient denies any concerning physical and or medical symptoms during the interviewing process          MEDICATIONS      Current Outpatient Medications:      cholecalciferol (VITAMIN D3) 125 mcg (5000 units) capsule, Take 1 capsule (125 mcg) by mouth daily, Disp: 100 capsule, Rfl: PRN     divalproex sodium extended-release (DEPAKOTE ER) 500 MG 24 hr tablet, Take 2 tablets (1,000 mg) by mouth At Bedtime, Disp: 60 tablet, Rfl: 1     Multiple Vitamins-Minerals (CENTRUM WOMEN) TABS, Take 1 tablet by mouth daily, Disp: , Rfl:      QUEtiapine (SEROQUEL) 300 MG tablet, Take 1 tablet (300 mg) by mouth At Bedtime, Disp: 30 tablet, Rfl: 1     QUEtiapine (SEROQUEL) 50 MG tablet, Take 1 tablet (50 mg) by mouth At Bedtime For 7 nights, then decrease to 300 mg at bedtime thereafter, Disp: 7 tablet, Rfl: 0     vitamin C (ASCORBIC ACID) 1000 MG TABS, Take 2 tablets (2,000 mg) by mouth daily, Disp: , Rfl:       If a controlled  substance is prescribed during today's appointment:    -The [place] Prescription Monitoring Program has been reviewed and there are no current concerns with: diversionary activity, early refill requests, and or obtaining the medication from multiple providers.        VITALS    BP Readings from Last 3 Encounters:   12/14/21 118/71   12/02/21 126/69   11/09/21 107/48       Pulse Readings from Last 3 Encounters:   12/14/21 84   12/02/21 82   11/09/21 77       @lastweight(3)@      LABS    The following labs were reviewed prior to or during the appointment:  -NA        SCALES    PHQ 12/17/2021   PHQ-9 Total Score 3   Q9: Thoughts of better off dead/self-harm past 2 weeks Not at all        RASHEL-7 SCORE 12/17/2021   Total Score 3         MENTAL STATUS EXAMINATION    Appearance: JUAN M  General Behavior:  Cooperative  Speech: Fluent, Normal rate and volume  Musculoskeletal:  JUAN M   Mood:  Alright  Affect: JUAN M  Attention: Intact  Orientation:  Person, Place, Time of Day, Date, Situation  Thought Associations:  Intact  Thought Content: Reality based   Thought Processes: Organized, Forward thinking  Memory: No overt impairment noted   Language: Intact  Intellect/Fund of Knowledge: Above Average to Average; knowledge of current events  Judgement: Fair to Good  Insight:  Fair to Good          ASSESSMENT/CLINICAL IMPRESSIONS    Summary:    Bibiana Andino is a 22 y/o  female with a history of: PTSD and Bipolar I with psychotic features.      At the end of Sept/2021 the patient was experiencing several mental health symptoms contributing to decompensation including: PTSD symptoms, hypomania, depression, and high levels   of anxiety.      She most recently underwent a psychiatric inpatient hospitalization at [facility] from 11/9 to 12/3/2021 for manic and psychotic symptoms, in addition to demonstrating Hinduism pre-occupation.   Depakote and Seroquel were initiated during this stay, and the patient eventually signed  a stay of commitment during this hospitalization.    She was discharged to the [facility] where she is currently residing.     Bibiana attended her first appointment for medication management at the [facility] on 12/17/2021 with instructions to taper the Seroquel from 400 to 300 mg.   Unfortunately, the patient wasn't able to lower the dosage of the antipsychotic due to an error/scripts sent to the wrong pharmacy.        During today's appointment, reviewed with IRTS staff the correct location of the pharmacy.    Additionally, the patient continues to report sleeping 10 to 12 hours per night, as well as taking daytime naps approximately 3 to 4x/week.  She would still like to trial a decreased dosage of the antipsychotic, to see if overall energy levels improve.    Presentation is polite and forward thinking.   TS staff doesn't note any concerns with allyson/hypomania or psychotic symptoms, nor are any noted during the interview (although again, limitations in assessment due to appointment is a telephone encounter versus video).        DSM-V and or working diagnosis:      1. Bipolar I, with hx of psychotic features     2. PTSD        SAFETY EVALUATION:  Suicidal ideations:  -Intermittent fleeting passive thoughts  -Denies intent/plan  Homicidal ideations:  -Denies  Access to guns:   -None at this time ... residing at [facility]  Protective and mitigating factors:  -Sister  -Some friends/peers  Risk factors:  -Age  -Hx of erratic, impulsive behaviors during allyson or psychotic episodes   Risk assessment:  -Low to medium        TREATMENT PLAN      Medications:  Start:  Seroquel 350 mg for 7 nights, then 300 mg at bedtime thereafter        Continue:  Depakote ER 1000 mg at bedtime      Labs:  -None ordered at this time      Therapy:  -[facility] is working to establish trauma based therapy for the patient       Non-pharmacological modalities:  -Did not discuss      Return to Clinic or Referrals:  -You do  not need to return to the Transition [facility] for medication management  -Please make sure to keep the appointment on: 1/18/2022 for longitudinal outpatient psychiatry services per [facility] [place], [place]        Total time:  31 minutes per:    -Review of EMR or paper documentation = 0 minutes   -Appointment time = 31 minutes          Sylvia WOODRUFF-CNP, [facility]-BC    ----------------------------------------------------------------------------------------------------------------------------------------------------------------------------------------------------------------------------------------------------------------        TREATMENT RISK STATEMENT    The risks, benefits, alternatives, and potential adverse effects have been explained and are understood by the patient.  The patient agrees to the treatment plan with their ability to do so.      The patient knows to call the clinic: [phone] for any problems or concerns until the next psychiatry visit, regardless if it is within or outside of the [place] [facility] system.     If unable to reach clinic staff (via phone call or medical messaging) during the normal business hours: 8:00 am to 4:30 pm then it is recommended accessing the nearest: emergency department, urgent care facility, or utilizing local (varies based on county of residence) and national crisis #'s or text messaging services for immediate assistance.          ---------------------------------------------------------------------------------------------------------------------------------------------------------------------------------------------------------------------------------------------------------------        If applicable the following has been discussed with the patient, parent/guardian, and or attending family member during the appointment:      1. Risks of polypharmacy and possible drug interactions with current medication list + common OTC products, herbs, and  supplements.    Moving forward, it is suggested to intermittently check-in with a clinic or retail pharmacist whenever new medications or OTC/h/s are consumed.    2. Recommendation to adhere to CDC guidelines as it relates alcohol consumption.  If taking benzodiazepines, you should abstain from alcohol intake due to increased risks of CNS and respiratory depression, as well as psychomotor impairment.    3. If possible, it is recommended to avoid concurrent use of prescribed:  opioids  +  benzodiazepines due to increased risks of CNS and respiratory depression, as well as the increased risk of overdose.     4. Recommendation to minimize and or abstain from THC use (unless the pt. is prescribed medical marijuana).    5. Recommendation to abstain from illicit substances including but not limited to the following: heroin, street fentanyl, cocaine, methamphetamines, and bath salts.    6. Do not take opioids, stimulants, and or other prescription medications unless they are specifically prescribed for you.    7. Recommendation to abstain from tobacco/smoking, alcohol, THC, and all illicit substances if trying to become or are pregnant.    8. Black Box Warnings associated with the prescribed psychotropic(s).    9. Potential adverse effects of antipsychotics including but not limited to the following: weight gain, metabolic syndrome, EPS/Tardive Dyskinesias.    10. Potential CV and neurological adverse effects of stimulants including but not limited to the following:  sudden death, MI, stroke, HTN, cardiomyopathy (long term use) as well as seizures.

## 2021-12-28 ENCOUNTER — VIRTUAL VISIT (OUTPATIENT)
Dept: BEHAVIORAL HEALTH | Facility: CLINIC | Age: 21
End: 2021-12-28
Attending: NURSE PRACTITIONER
Payer: MEDICAID

## 2021-12-28 DIAGNOSIS — F43.10 PTSD (POST-TRAUMATIC STRESS DISORDER): ICD-10-CM

## 2021-12-28 DIAGNOSIS — F31.9 BIPOLAR I DISORDER (H): Primary | ICD-10-CM

## 2021-12-28 PROCEDURE — 99443 PR PHYSICIAN TELEPHONE EVALUATION 21-30 MIN: CPT | Performed by: NURSE PRACTITIONER

## 2021-12-28 RX ORDER — QUETIAPINE FUMARATE 300 MG/1
300 TABLET, FILM COATED ORAL AT BEDTIME
Qty: 30 TABLET | Refills: 1 | Status: SHIPPED | OUTPATIENT
Start: 2021-12-28 | End: 2023-11-16

## 2021-12-28 RX ORDER — QUETIAPINE FUMARATE 50 MG/1
50 TABLET, FILM COATED ORAL AT BEDTIME
Qty: 7 TABLET | Refills: 0 | Status: SHIPPED | OUTPATIENT
Start: 2021-12-28 | End: 2022-01-20 | Stop reason: DRUGHIGH

## 2021-12-28 RX ORDER — DIVALPROEX SODIUM 500 MG/1
1000 TABLET, EXTENDED RELEASE ORAL AT BEDTIME
Qty: 60 TABLET | Refills: 1 | Status: SHIPPED | OUTPATIENT
Start: 2021-12-28 | End: 2024-04-26

## 2021-12-28 ASSESSMENT — ANXIETY QUESTIONNAIRES
6. BECOMING EASILY ANNOYED OR IRRITABLE: SEVERAL DAYS
2. NOT BEING ABLE TO STOP OR CONTROL WORRYING: NOT AT ALL
5. BEING SO RESTLESS THAT IT IS HARD TO SIT STILL: NOT AT ALL
GAD7 TOTAL SCORE: 4
1. FEELING NERVOUS, ANXIOUS, OR ON EDGE: SEVERAL DAYS
3. WORRYING TOO MUCH ABOUT DIFFERENT THINGS: SEVERAL DAYS
4. TROUBLE RELAXING: NOT AT ALL
7. FEELING AFRAID AS IF SOMETHING AWFUL MIGHT HAPPEN: SEVERAL DAYS

## 2021-12-28 ASSESSMENT — PATIENT HEALTH QUESTIONNAIRE - PHQ9: SUM OF ALL RESPONSES TO PHQ QUESTIONS 1-9: 0

## 2021-12-28 NOTE — PATIENT INSTRUCTIONS
Start:  Seroquel 350 mg for 7 nights, then 300 mg at bedtime thereafter        Continue:  Depakote ER 1000 mg at bedtime    ----------------------------------    -You do not need to return to the [facility] for medication management    -Please make sure to keep the appointment on: 1/18/2022 for longitudinal outpatient psychiatry services

## 2021-12-28 NOTE — PROGRESS NOTES
" This video/telephone visit will be conducted virtually between you and your provider. We have found that certain health care needs can be provided without the need for an in-person physical exam. This service lets us provide the care you need with a video /telephone conversation. If a prescription is necessary we can send it directly to your pharmacy. If lab work is needed we can place an order for that and you can then stop by our lab to have the test done at a later time.\"   Just as we bill insurance for in-person visits, we also bill insurance for video/telephone visits. If you have questions about your insurance coverage, we recommend that you speak with your insurance company.      Patient has given verbal consent for video/Telephone visit? yes    Patient would like the video visit invitation sent by: call JEREMIAH  [phone] FOR APPT     Patient verified allergies, medications and pharmacy via phone. Patient states they are ready for visit.    Tisha Arroyo RN on December 28, 2021 at 10:26 AM    Mental Health &Addiction (MH&A)Transition Clinic (TC):     Provides Patient Support While Waiting to Access Programmatic and Outpatient MH&A Care and Provides [facility] Crisis Assessment Services     NURSING FOLLOW-UP VISIT  ____________________________________________      \"The Transition Clinic is a temporary psychiatry service that helps to bridge the time to your next appointment. It is not intended to be a long-term service and you are expected to attend your scheduled appointment with your next provider.\"  [x] Patient verbalized understanding    If you need support between appointments, please call [phone] and let them know you're seen by [facility] Psychiatry. You may also send a [employer] message to reach us.    General-     Most pressing needs at this time: pt reports she needs to go see a therapist     Mental Health currently: pretty good, states she's been able to be more rational, taking " meds & eating meals, asking for help, communicating more, letting people know her feelings     Any changes to your physical health: no          Medications-     Injectable medications currently prescribed: no     If yes, do you need an appointment for the next injection: n/a     Any Controlled Substances that you are prescribed: no     Any refills you are aware that you'll need soon: reports she never picked up the decrease Seroquel, requests it be sent to facility preferred pharmacy this time ([facility]) - staff will fax a med list       Primary care provider: Physician No Ref-Primary        RASHEL-7 scores: TODAY: 4  RASHEL-7 SCORE 12/17/2021   Total Score 3       PHQ-9 scores: TODAY: 0    PHQ-9 SCORE 12/17/2021   PHQ-9 Total Score 3         Anything the provider should be aware of for today's appointment: nothing specific    New (awaiting) Mental health provider or next programming: [facility]      Date of scheduled apt: 1/18/2022   Days until this apt: ~21    Tisha Arroyo RN on December 28, 2021 at 10:26 AM

## 2021-12-28 NOTE — PROGRESS NOTES
MH&A TC   NURSING Post-Appointment Chart-check:    Correct pharmacy verified with patient and updated in chart? [x] yes []no    Charge captured ? [] yes  [x] no    Medications ordered this visit were e-scribed.  Verified by order class [x] yes  [] no    List Medications:  QUEtiapine (SEROQUEL) 50 MG   QUEtiapine (SEROQUEL) 300 MG   divalproex sodium extended-release (DEPAKOTE ER) 500 MG   E-Prescribing Status: Receipt confirmed by pharmacy (12/28/2021 11:09 AM CST)    Medication changes or discontinuations were communicated to patient's pharmacy: [] yes  [x] no    This RN faxed orders to [facility] at [phone]    UA collected [] yes  [] no  [x] n/a-virtual     Future appointment was made: [] yes  [] no  [x] n/a    Dictation completed at time of chart check: [] yes  [x] no    I have checked the documentation for today s encounters and the above information has been reviewed and completed.      Tisha Arroyo RN on December 28, 2021 at 3:06 PM

## 2021-12-29 ASSESSMENT — ANXIETY QUESTIONNAIRES: GAD7 TOTAL SCORE: 4

## 2022-01-11 ENCOUNTER — TELEPHONE (OUTPATIENT)
Dept: FAMILY MEDICINE | Facility: CLINIC | Age: 22
End: 2022-01-11
Payer: MEDICAID

## 2022-01-11 NOTE — TELEPHONE ENCOUNTER
Reason for Call: Other call back    Detailed comments: Sylvia Ahmadi, mental health practitioner, is requesting a call back at [phone] extension [phone] from [facility] to discuss / touch base RE the patient. Thanks!    Call taken on 1/11/2022 at 2:23 PM by Niesha Walters

## 2022-01-12 ENCOUNTER — TELEPHONE (OUTPATIENT)
Dept: BEHAVIORAL HEALTH | Facility: CLINIC | Age: 22
End: 2022-01-12
Payer: MEDICAID

## 2022-01-12 NOTE — TELEPHONE ENCOUNTER
Return providers call and requested to send information via email as have no history with patient.

## 2022-01-12 NOTE — TELEPHONE ENCOUNTER
Sylvia Ahmadi, mental health practitioner, is requesting a call back at [phone] extension [phone] from [facility] to discuss / touch base RE the patient. Thanks!     [facility] return call and left a message.

## 2022-01-12 NOTE — TELEPHONE ENCOUNTER
She said's you have been playing phone tag she would like you to call her back at [phone] Extension [phone]

## 2022-01-13 ENCOUNTER — TELEPHONE (OUTPATIENT)
Dept: BEHAVIORAL HEALTH | Facility: CLINIC | Age: 22
End: 2022-01-13
Payer: MEDICAID

## 2022-01-13 NOTE — TELEPHONE ENCOUNTER
Sylvia Ahmadi, mental health practitioner, is requesting a call back at [phone] extension [phone] from [facility] to discuss / touch base RE the patient. Thanks!     Clarified with Elvin the role of his [facility]   .  In addtion, clarified Ms Trejo is part of the transition team.  Noted in  records patient has initial appointment with psychiatry at [facility] [place] on January 18, 2022.  She will reach out to [facility] and  [facility] or [facility] to coordinate individual trauma therapist.    [facility] has a follow-up appointment on the 17th with the patient.

## 2022-01-19 ENCOUNTER — VIRTUAL VISIT (OUTPATIENT)
Dept: BEHAVIORAL HEALTH | Facility: CLINIC | Age: 22
End: 2022-01-19
Payer: MEDICAID

## 2022-01-19 DIAGNOSIS — F43.10 PTSD (POST-TRAUMATIC STRESS DISORDER): ICD-10-CM

## 2022-01-19 DIAGNOSIS — F31.9 BIPOLAR I DISORDER (H): Primary | ICD-10-CM

## 2022-01-19 PROCEDURE — 90832 PSYTX W PT 30 MINUTES: CPT | Mod: 95 | Performed by: SOCIAL WORKER

## 2022-01-19 NOTE — PROGRESS NOTES
"[facility]  2022    Behavioral Health Clinician Progress Note    Voice recognition technology may have been utilized for some of the information in this medical record.      Patient Name: Bibiana Andino         Service Type: Individual           Service Location:  Phone call (patient / identified key support person reached)      Session Start Time:  310pm  Session End Time: 340pm*      Session Length: 16 - 37      Attendees: Client    Visit Activities (Refresh list every visit): NEW    Phone Visit:      Provider verified identity through the following two step process.  Patient provided:  Patient     The patient has been notified of the following:      \"We have found that certain health care needs can be provided without the need for a face to face visit.  This service lets us provide the care you need with a phone conversation.       I will have full access to your [facility] medical record during this entire phone call.   I will be taking notes for your medical record.      Since this is like an office visit, we will bill your insurance company for this service.       There are potential benefits and risks of telephone visits (e.g. limits to patient confidentiality) that differ from in-person visits.?Confidentiality still applies for telephone services, and nobody will record the visit.  It is important to be in a quiet, private space that is free of distractions (including cell phone or other devices) during the visit.??      If during the course of the call I believe a telephone visit is not appropriate, you will not be charged for this service\"     Consent has been obtained for this service by care team member: Yes     Diagnostic Assessment Date: *21  transitions team  Sylvia Rossi  Treatment Plan Review Date: *To be developed by community therapist      Depression and Anxiety Follow-Up    PHQ-9 ([employer]) 2021   1.  Little " interest or pleasure in doing things 0 0   2.  Feeling down, depressed, or hopeless 0 0   3.  Trouble falling or staying asleep, or sleeping too much 0 0   4.  Feeling tired or having little energy 0 0   5.  Poor appetite or overeating 1 0   6.  Feeling bad about yourself 1 0   7.  Trouble concentrating 0 0   8.  Moving slowly or restless 1 0   9.  Suicidal or self-harm thoughts 0 0   PHQ-9 Total Score 3 0     RASHEL-7   [employer], 2002; Used with Permission) 12/17/2021 12/28/2021   1. Feeling nervous, anxious, or on edge 1 1   2. Not being able to stop or control worrying 0 0   3. Worrying too much about different things 1 1   4. Trouble relaxing 0 0   5. Being so restless that it is hard to sit still 0 0   6. Becoming easily annoyed or irritable 1 1   7. Feeling afraid, as if something awful might happen 0 1   RASHEL-7 Total Score 3 4       CLARITZA LEVEL:  No flowsheet data found.    DATA  Extended Session (60+ minutes): No  Interactive Complexity: No  Crisis: No  [facility] Patient No    Treatment Objective(s) Addressed in This Session:  Target Behavior(s):  Depressed Mood: Increase interest, engagement, and pleasure in doing things  Feel less tired and more energy during the day   Identify negative self-talk and behaviors: challenge core beliefs, myths, and actions  Improve concentration, focus, and mindfulness in daily activities       Current Stressors / Issues:    [facility] following up with initial phone call from last week.  Counseled patient that spoke to her , Elvin Gómez at [facility] to clarify different service providers that patient had met with.  Furthermore, coordinated services.    Patient reports that she is scheduled with in person EMDR therapist at [facility].  Patient unsure of start date.  Unfortunately, patient reports her initial community psychiatrist appointment for yesterday at [facility] in [facility] was rescheduled for February 15, 2022.  Patient reviewed records and noted  patient had a 30-day supply of her medications. [facility] provided contact number for the transitions team but  will also send message to Dr. Messina to renew the medications for 1 more month.  Patient reports she is tolerating the Depakote and Seroquel well.  Patient denies any side effects.  Patient reports her mood is more stable.  Patient denies recent manic episodes or hallucinations.  Patient denies further suicidal thoughts.  Patient is alert, tracking and oriented.    Provided further education to patient about trauma and how it impacts the mind body and physical symptoms.  Patient reports she will be residing at the [facility] for 1 more month and then transition to the community.  Patient she will be referred to an ACT team.    Plan    Patient will reach out to the [facility] as needed in the future.  At the present time, patient has a county  through the [facility].  Patient is on a stay of commitment.  Patient is being referred to a community psychiatrist and therapist.    Progress on Treatment Objective(s) / Homework:  New Objective established this session - PREPARATION (Decided to change - considering how); Intervened by negotiating a change plan and determining options / strategies for behavior change, identifying triggers, exploring social supports, and working towards setting a date to begin behavior change    Motivational Interviewing    MI Intervention: Supported Autonomy, Collaboration, Evocation, Permission to raise concern or advise and Open-ended questions     Change Talk Expressed by the Patient: Need to change    Provider Response to Change Talk: E - Evoked more info from patient about behavior change, A - Affirmed patient's thoughts, decisions, or attempts at behavior change, R - Reflected patient's change talk and S - Summarized patient's change talk statements      PSYCHODYMANIC PSYCHOTHERAPY: Discussed patient's emotional dynamics and issues and how they impact behaviors,Explored  patient's history of relationships and how they impact present behaviors, Explored how to work with and make changes in these schemas and patterns    Care Plan review completed: No    Medication Review:  No changes to current psychiatric medication(s)    Medication Compliance:  Yes    Changes in Health Issues:   None reported    Chemical Use Review:   Substance Use: Chemical use reviewed, no active concerns identified      Tobacco Use: No current tobacco use.      Assessment: Current Emotional / Mental Status (status of significant symptoms):  Risk status (Self / Other harm or suicidal ideation)  Patient has had a history of suicidal ideation: See epic records   Patient denies current suicidal thoughts.    Patient denies current fears or concerns for personal safety.  Patient denies current or recent suicidal ideation or behaviors.  Patient denies current or recent homicidal ideation or behaviors.  Patient denies current or recent self injurious behavior or ideation.  Patient denies other safety concerns.  A safety and risk management plan has not been developed at this time, however patient was encouraged to call [place] [phone] should there be a change in any of these risk factors.      Appearance:   Unable to assess on the phone   Eye Contact:   Unable to assess on phone   Psychomotor Behavior: Unable to assess on phone   Attitude:   Cooperative   Orientation:   All  Speech   Rate / Production: Normal    Volume:  Normal   Mood:    Normal  Affect:    Appropriate   Thought Content:  Clear   Thought Form:  Coherent  Logical   Insight:    Fair     Diagnoses:  1. Bipolar I disorder (H)    2. PTSD (post-traumatic stress disorder)        Collateralcompleted:  Routed note to PCP    Plan: (Homework, other):  Patient was given information about behavioral services and encouraged to schedule a follow up appointment with the clinic [facility] as needed. [email or web address]: information about mental health symptoms and treatment  options  and information about mental health symptoms and a referral was made to [facility] and/or Community Psychiatry.  She was also given CD Recommendations: No indications of CD issues.  Enmanuel Dooley, DIMITRI, [facility]

## 2022-01-19 NOTE — Clinical Note
Jamari Ward,    I am the [facility] and was asked to follow up with Bibiana by her pcp, Dr Grimes. My primary role was to ensure she had mh follow up.  I clarified services within [facility] with her and her [facility].      She had an initial follow-up appointment with a community psychiatrist at [facility] yesterday but this was rescheduled to February 15.  However, in talking with her today, I noted that you have prescribed a 30-day supply of her Depakote and Seroquel on December 28 so it appears she will run out before she can meet with her new community psychiatrist.  You can see my progress note today for further information.  Would you be open to reach out to her to refill her medications.  She is currently in the [facility] residence and reports her mood has stabilized considerably.  She has been referred to a community EMDR therapist as well.    Thank you,    Enmanuel Dooley, CARLIE, [facility]  [address] [place] [id number]

## 2022-01-20 ENCOUNTER — TELEPHONE (OUTPATIENT)
Dept: BEHAVIORAL HEALTH | Facility: CLINIC | Age: 22
End: 2022-01-20
Payer: MEDICAID

## 2022-01-20 DIAGNOSIS — F31.9 BIPOLAR I DISORDER (H): ICD-10-CM

## 2022-01-20 NOTE — TELEPHONE ENCOUNTER
1.    Quetiapine/Seroquel 50 mg removed from medication list    2.    Continue:    Depakote ER 1000 mg (2 tabs) at bedtime    Quetiapine 300 mg at bedtime

## 2022-01-20 NOTE — TELEPHONE ENCOUNTER
This RN r'cd staff message:  Sylvia Trejo, Michele Treadwell CNP, [facility]; Farida Grimes MD; Tisha Arroyo RN Hey David and Dr. Grimes-   I've included the [facility] Clinic RN in this message, so Bibiana can have another appointment with me at the Transition Clinic if she wants or I can just refill so she doesn't run out of meds before 2/25/2022.     Thanks for the message.   Sincerely,   Sylvia Trejo     cc'ed chart indicates the following:  Patient reports that she is scheduled with in person EMDR therapist at [facility].  Patient unsure of start date.  Unfortunately, patient reports her initial community psychiatrist appointment for yesterday at [facility] in [facility] was rescheduled for February 15, 2022.  Patient reviewed records and noted patient had a 30-day supply of her medications. [facility] provided contact number for the transitions team but  will also send message to Dr. Messina to renew the medications for 1 more month.  Patient reports she is tolerating the Depakote and Seroquel well.  Patient denies any side effects.  Patient reports her mood is more stable.  Patient denies recent manic episodes or hallucinations.  Patient denies further suicidal thoughts.  Patient is alert, tracking and oriented.  ---  Wtr called pt to see if she would like refill or return appt, she indicated she's doing well on meds & would request refill. Pt reports she's still at [facility] so agreed to their pharmacy of [place] for refill.     This RN notes that meds were ordered w/ 1RF - called  pharmacy to request the refills to be delivered to [facility]. Staff reported Depakote& Quetiapine to fill on 1/24, then they will deliver.     Wtr requests provider discontinue taper dose of Quetiapine 50mg to keep med list current.    iTsha Arroyo RN on 1/20/2022 at 9:47 AM

## 2022-01-26 ENCOUNTER — OFFICE VISIT (OUTPATIENT)
Dept: OBGYN | Facility: CLINIC | Age: 22
End: 2022-01-26
Payer: MEDICAID

## 2022-01-26 VITALS
DIASTOLIC BLOOD PRESSURE: 74 MMHG | OXYGEN SATURATION: 100 % | WEIGHT: 196.6 LBS | BODY MASS INDEX: 26.66 KG/M2 | HEART RATE: 85 BPM | SYSTOLIC BLOOD PRESSURE: 129 MMHG

## 2022-01-26 DIAGNOSIS — Z12.4 CERVICAL CANCER SCREENING: ICD-10-CM

## 2022-01-26 DIAGNOSIS — Z11.3 SCREEN FOR STD (SEXUALLY TRANSMITTED DISEASE): ICD-10-CM

## 2022-01-26 DIAGNOSIS — Z23 NEED FOR TDAP VACCINATION: ICD-10-CM

## 2022-01-26 DIAGNOSIS — Z30.09 BIRTH CONTROL COUNSELING: Primary | ICD-10-CM

## 2022-01-26 PROCEDURE — 99203 OFFICE O/P NEW LOW 30 MIN: CPT | Mod: 25 | Performed by: OBSTETRICS & GYNECOLOGY

## 2022-01-26 PROCEDURE — 90471 IMMUNIZATION ADMIN: CPT | Performed by: OBSTETRICS & GYNECOLOGY

## 2022-01-26 PROCEDURE — 90715 TDAP VACCINE 7 YRS/> IM: CPT | Performed by: OBSTETRICS & GYNECOLOGY

## 2022-01-26 PROCEDURE — 87491 CHLMYD TRACH DNA AMP PROBE: CPT | Performed by: OBSTETRICS & GYNECOLOGY

## 2022-01-26 PROCEDURE — G0145 SCR C/V CYTO,THINLAYER,RESCR: HCPCS | Performed by: OBSTETRICS & GYNECOLOGY

## 2022-01-26 PROCEDURE — 87591 N.GONORRHOEAE DNA AMP PROB: CPT | Performed by: OBSTETRICS & GYNECOLOGY

## 2022-01-26 RX ORDER — DESOGESTREL AND ETHINYL ESTRADIOL 0.15-0.03
1 KIT ORAL DAILY
Qty: 84 TABLET | Refills: 3 | Status: SHIPPED | OUTPATIENT
Start: 2022-01-26 | End: 2022-11-10
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
## 2022-01-26 NOTE — PATIENT INSTRUCTIONS
Patient Education     Birth Control: The Pill    Birth control pills contain hormones that help prevent pregnancy. The pills are prescribed by your healthcare provider. There are many types of birth control pills available. If you have side effects from one type of pill, tell your healthcare provider. He or she may be able to prescribe a pill that works better for you.  Pregnancy rates  Talk to your healthcare provider about the effectiveness of this birth control method.  Using the pill    Take one pill daily. Take it at around the same time each day.    Follow your healthcare provider s guidelines on when to start your first pack of pills. You may need to use another form of birth control for a week or more after you start.    Know what to do if you forget to take a pill. (Consult your healthcare provider or check the package.) If you miss more than one pill, you may need to use a backup method of birth control for a week or more.    Pros    Low pregnancy rate    No interruption to sex    Easy to use    Can help make periods more regular    May lower your risk of ovarian cysts and certain cancers    May decrease menstrual cramps, menstrual flow, and acne    Cons    Does not protect against sexually transmitted infections (STIs)    Requires taking a pill on time each day    May not work as well when taken with certain other medicines (check with your pharmacist)    May cause side effects such as nausea, irregular bleeding, headaches, breast tenderness, fatigue, or mood changes (these often go away within 3 months)    May increase the risk of blood clots, heart attack, and stroke    The pill may not be for you  The pill may not be for you if:    You are a smoker and over age 35    You have high blood pressure or gallbladder, liver, cerebrovascular or heart disease    You have diabetes, migraines, blood clot in the vein or artery, lupus, depression, certain lipid disorders, or take medicines that interfere with the  pill  In these cases, discuss the risks with your healthcare provider.  [employer] last reviewed this educational content on 4/1/2020 2000-2021 [employer]. All rights reserved. This information is not intended as a substitute for professional medical care. Always follow your healthcare professional's instructions.

## 2022-01-26 NOTE — LETTER
January 28, 2022      Bibianayane Andino  [address] [place] [address]        Dear ,    We are writing to inform you of your test results.    Your test results fall within the expected range(s) or remain unchanged from previous results.  Please continue with current treatment plan.    Resulted Orders   NEISSERIA GONORRHOEA PCR   Result Value Ref Range    Neisseria gonorrhoeae Negative Negative      Comment:      Negative for N. gonorrhoeae rRNA by transcription mediated amplification. A negative result by transcription mediated amplification does not preclude the presence of C. trachomatis infection because results are dependent on proper and adequate collection, absence of inhibitors and sufficient rRNA to be detected.   CHLAMYDIA TRACHOMATIS PCR   Result Value Ref Range    Chlamydia trachomatis Negative Negative      Comment:      A negative result by transcription mediated amplification does not preclude the presence of C. trachomatis infection because results are dependent on proper and adequate collection, absence of inhibitors and sufficient rRNA to be detected.       If you have any questions or concerns, please call the clinic at the number listed above.       Sincerely,      Kasia Cuevas MD

## 2022-01-27 LAB
C TRACH DNA SPEC QL NAA+PROBE: NEGATIVE
N GONORRHOEA DNA SPEC QL NAA+PROBE: NEGATIVE

## 2022-01-28 LAB
BKR LAB AP GYN ADEQUACY: NORMAL
BKR LAB AP GYN INTERPRETATION: NORMAL
BKR LAB AP HPV REFLEX: NO
BKR LAB AP LMP: NORMAL
BKR LAB AP PREVIOUS ABNORMAL: NORMAL
PATH REPORT.COMMENTS IMP SPEC: NORMAL
PATH REPORT.COMMENTS IMP SPEC: NORMAL
PATH REPORT.RELEVANT HX SPEC: NORMAL

## 2022-01-31 ENCOUNTER — TELEPHONE (OUTPATIENT)
Dept: FAMILY MEDICINE | Facility: CLINIC | Age: 22
End: 2022-01-31
Payer: MEDICAID

## 2022-01-31 NOTE — TELEPHONE ENCOUNTER
Reason for Call:  Form, our goal is to have forms completed with 72 hours, however, some forms may require a visit or additional information.    Type of letter, form or note:  medical    Who is the form from?: County worker (if other please explain)    Where did the form come from: form was faxed in    What clinic location was the form placed at?: [facility]    Where the form was placed[facility]    What number is listed as a contact on the form?: [phone] - [place]       Additional comments: N/A    Call taken on 1/31/2022 at 2:11 PM by Joan Steven

## 2022-03-26 ENCOUNTER — HEALTH MAINTENANCE LETTER (OUTPATIENT)
Age: 22
End: 2022-03-26

## 2022-04-01 ENCOUNTER — MEDICAL CORRESPONDENCE (OUTPATIENT)
Dept: HEALTH INFORMATION MANAGEMENT | Facility: CLINIC | Age: 22
End: 2022-04-01
Payer: COMMERCIAL

## 2022-04-13 ENCOUNTER — TELEPHONE (OUTPATIENT)
Dept: BEHAVIORAL HEALTH | Facility: CLINIC | Age: 22
End: 2022-04-13

## 2022-04-13 ENCOUNTER — HOSPITAL ENCOUNTER (EMERGENCY)
Facility: CLINIC | Age: 22
Discharge: HOME | End: 2022-04-13
Attending: EMERGENCY MEDICINE | Admitting: EMERGENCY MEDICINE
Payer: COMMERCIAL

## 2022-04-13 VITALS
BODY MASS INDEX: 24.5 KG/M2 | SYSTOLIC BLOOD PRESSURE: 142 MMHG | DIASTOLIC BLOOD PRESSURE: 80 MMHG | RESPIRATION RATE: 16 BRPM | HEIGHT: 71 IN | WEIGHT: 175 LBS | HEART RATE: 88 BPM | OXYGEN SATURATION: 99 % | TEMPERATURE: 97.8 F

## 2022-04-13 DIAGNOSIS — Z04.6 INVOLUNTARY COMMITMENT: ICD-10-CM

## 2022-04-13 DIAGNOSIS — F31.9 BIPOLAR 1 DISORDER (H): ICD-10-CM

## 2022-04-13 DIAGNOSIS — Z11.52 ENCOUNTER FOR SCREENING LABORATORY TESTING FOR SEVERE ACUTE RESPIRATORY SYNDROME CORONAVIRUS 2 (SARS-COV-2): ICD-10-CM

## 2022-04-13 LAB
AMPHETAMINES UR QL SCN: ABNORMAL
BARBITURATES UR QL: ABNORMAL
BENZODIAZ UR QL: ABNORMAL
CANNABINOIDS UR QL SCN: ABNORMAL
COCAINE UR QL: ABNORMAL
HCG UR QL: NEGATIVE
OPIATES UR QL SCN: ABNORMAL
SARS-COV-2 RNA RESP QL NAA+PROBE: NEGATIVE

## 2022-04-13 PROCEDURE — 87635 SARS-COV-2 COVID-19 AMP PRB: CPT | Performed by: EMERGENCY MEDICINE

## 2022-04-13 PROCEDURE — 81025 URINE PREGNANCY TEST: CPT | Performed by: EMERGENCY MEDICINE

## 2022-04-13 PROCEDURE — 99282 EMERGENCY DEPT VISIT SF MDM: CPT | Performed by: EMERGENCY MEDICINE

## 2022-04-13 PROCEDURE — 80307 DRUG TEST PRSMV CHEM ANLYZR: CPT | Performed by: EMERGENCY MEDICINE

## 2022-04-13 PROCEDURE — 99283 EMERGENCY DEPT VISIT LOW MDM: CPT

## 2022-04-13 PROCEDURE — C9803 HOPD COVID-19 SPEC COLLECT: HCPCS

## 2022-04-13 PROCEDURE — 99285 EMERGENCY DEPT VISIT HI MDM: CPT | Mod: 25

## 2022-04-13 ASSESSMENT — ENCOUNTER SYMPTOMS
NECK PAIN: 0
VOMITING: 0
AGITATION: 0
NAUSEA: 0
SLEEP DISTURBANCE: 0
BACK PAIN: 0
HEADACHES: 0
DECREASED CONCENTRATION: 0
CONFUSION: 0
SHORTNESS OF BREATH: 0
FEVER: 0
HYPERACTIVE: 0
ABDOMINAL PAIN: 0
CHILLS: 0
WOUND: 0
DIFFICULTY URINATING: 0
HALLUCINATIONS: 0
NERVOUS/ANXIOUS: 0

## 2022-04-13 NOTE — ED PROVIDER NOTES
"    [facility] EMERGENCY DEPARTMENT ([facility])       4/13/22  History     Chief Complaint   Patient presents with     Manic Behavior     Pt's Civil Commitment has been revoked.  [facility], Pt was found by [employer] singing in a vestibule at an apartment building in the rain.     HPI  Bibiana Andino is a 21 year old female with a past medical history significant for bipolar 1 disorder, PTSD, psychosis, and suicidal ideation who presents to the ED after she was brought in by the police.  Apparently, patient was standing in the vestibule seeking her Sprite from the rain.  She had been singing/dancing at that time.   were called and patient was brought to the ED.  Patient thought that the  were going to bring her home.  Currently, patient recently had revocation of her stay of commitment and needs to be held until transfer to [facility] or other appropriate treatment facility can be arranged.  Patient is aware of this revocation.  She is unsure of the indications.  She states she has been taking her medication.  She denies any auditory/visual hallucinations, homicidal or suicidal ideations, psychosis, confusion, fever/chills, has no other medical complaints.      Per Chart:  \"Apprehension order shall be issued.  Patient is to be held at the [facility] until transfer to [facility] or other appropriate treatment facility patientcan be arranged\"            Past Medical History:   Diagnosis Date     PTSD (post-traumatic stress disorder)      Vaginal pain        No past surgical history on file.    No family history on file.    Social History     Tobacco Use     Smoking status: Never Smoker     Smokeless tobacco: Never Used   Substance Use Topics     Alcohol use: Not Currently       No current facility-administered medications for this encounter.     Current Outpatient Medications   Medication     cholecalciferol (VITAMIN D3) 125 mcg (5000 units) capsule     " "desogestrel-ethinyl estradiol (APRI) 0.15-30 MG-MCG tablet     divalproex sodium extended-release (DEPAKOTE ER) 500 MG 24 hr tablet     Multiple Vitamins-Minerals (CENTRUM WOMEN) TABS     QUEtiapine (SEROQUEL) 300 MG tablet     vitamin C (ASCORBIC ACID) 1000 MG TABS      No Known Allergies     I have reviewed the Medications, Allergies, Past Medical and Surgical History, and Social History in the Epic system.    Review of Systems   Constitutional: Negative for chills and fever.   Eyes: Negative for visual disturbance.   Respiratory: Negative for shortness of breath.    Cardiovascular: Negative for chest pain.   Gastrointestinal: Negative for abdominal pain, nausea and vomiting.   Genitourinary: Negative for difficulty urinating.   Musculoskeletal: Negative for back pain and neck pain.   Skin: Negative for wound.   Neurological: Negative for headaches.   Psychiatric/Behavioral: Negative for agitation, behavioral problems, confusion, decreased concentration, hallucinations, self-injury, sleep disturbance and suicidal ideas. The patient is not nervous/anxious and is not hyperactive.      A complete review of systems was performed with pertinent positives and negatives noted in the HPI, and all other systems negative.    Physical Exam   BP: (!) 129/93  Pulse: 63  Temp: 98.7  F (37.1  C)  Resp: 16  Height: 180.3 cm (5' 11\")  Weight: 79.4 kg (175 lb)  SpO2: 100 %      Physical Exam  Vitals and nursing note reviewed.   Constitutional:       General: She is not in acute distress.     Appearance: Normal appearance.   HENT:      Head: Normocephalic.      Nose: Nose normal.   Eyes:      Pupils: Pupils are equal, round, and reactive to light.   Cardiovascular:      Rate and Rhythm: Normal rate and regular rhythm.   Pulmonary:      Effort: Pulmonary effort is normal.   Abdominal:      General: There is no distension.   Musculoskeletal:         General: No deformity. Normal range of motion.      Cervical back: Normal range of " motion.   Skin:     General: Skin is warm.   Neurological:      Mental Status: She is alert and oriented to person, place, and time.   Psychiatric:         Attention and Perception: Attention normal.         Mood and Affect: Mood normal.         Speech: Speech normal.         Behavior: Behavior normal. Behavior is cooperative.         Thought Content: Thought content does not include homicidal or suicidal ideation.         Cognition and Memory: Cognition normal.         ED Course     At 1:26 AM the patient was seen and examined by , MD in Room ED09.          Results for orders placed or performed during the hospital encounter of 04/13/22 (from the past 24 hour(s))   Asymptomatic COVID-19 Virus (Coronavirus) by PCR Oropharynx    Specimen: Oropharynx; Swab   Result Value Ref Range    SARS CoV2 PCR Negative Negative    Narrative    Testing was performed using the hemal  SARS-CoV-2 & Influenza A/B Assay on the hemal  Jadyn  System.  This test should be ordered for the detection of SARS-COV-2 in individuals who meet SARS-CoV-2 clinical and/or epidemiological criteria. Test performance is unknown in asymptomatic patients.  This test is for in vitro diagnostic use under the FDA EUA for laboratories certified under CLIA to perform moderate and/or high complexity testing. This test has not been FDA cleared or approved.  A negative test does not rule out the presence of PCR inhibitors in the specimen or target RNA in concentration below the limit of detection for the assay. The possibility of a false negative should be considered if the patient's recent exposure or clinical presentation suggests COVID-19.  [facility] are certified under the Clinical Laboratory Improvement Amendments of 1988 (CLIA-88) as qualified to perform moderate and/or high complexity laboratory testing.     Medications - No data to display          Assessments & Plan (with Medical Decision Making)   Patient presents to the ED after she  was brought in by .  Recent revocation of her commitment.  Apprehension order was issued.  Patient has instructions to be held at [place] until transfer to [facility] or other appropriate treatment facility can be arranged.    On arrival, patient is calm and cooperative.  She denies homicidal or suicidal ideation.  Does not have evidence of acute psychosis.  Discussed with intake.  They will arrange transfer to [facility].    I have reviewed the nursing notes.    I have reviewed the findings, diagnosis, plan and need for follow up with the patient.    New Prescriptions    No medications on file       Final diagnoses:   Involuntary commitment         Will Hammer,   4/13/2022   [facility] EMERGENCY DEPARTMENT     Will Hammer, DO  04/13/22 0757

## 2022-04-13 NOTE — TELEPHONE ENCOUNTER
Potential discharge on 4500  1113am - [facility], on call provider, paged   1158am - [facility] paged  1204pm - [facility] accepts     R: 4500/[facility]     Pt placed in queue   1208pm - unit charge notified, 1p for report   1211pm - ED charge notified via text page

## 2022-04-13 NOTE — DISCHARGE INSTRUCTIONS
Indications for return to Emergency Department or to seek further assistance:  Thoughts of harm to self or others that you feel you may act on  Thoughts of suicide  Feeling unsafe  Major changes in mood, sleep or apetite  Difficulty concentrating or completing regular daily tasks/ functions  Feelings of paranoia, or feelings that you are losing touch with reality  Resources for Mental Health:  *(asterisk indicates free service)    *National Suicide Prevention Lifeline  1-131.944.2621     *[facility]'s National Help Line  (Treatment Referral Routing for Mental & Chemical Health)  1-810.415.2487      *Walk-In Counseling Center- [facility]  [address], [place]  [phone]  Mon, Wed, & Fri 1PM-3PM  Mon, Tues, Wed, & Thu 630PM-830PM  (no appointment needed)    *[facility]- [facility]  [address], [place], [place]  Tue & Thu 6PM-8PM  (no appointment needed)    *[address], [place], [place]  [phone]  [email or web address]      [facility] Crisis Lines    [facility] Crisis Line  [phone] [facility] Crisis Line  [phone]   [facility] Crisis Line  [phone] [facility] Crisis Line  [phone]   [facility] Crisis Line  [phone] [facility] Crisis Line  [phone] for 8AM-430PM  [phone] for 430PM-8AM   [facility] Crisis Line  [phone] [facility] Crisis Line  [phone]     Outpatient Mental Health Clinics   *[facility]  [address] [place], [place]  [phone] *[facility]  [address], [place]  [phone]    *[facility] ([facility])  [address], [place]  [phone] [facility] Counsel[facility]  [address] [place], [place]  [phone]    [facility]  [address]  [place], [place]  [phone] [facility]  [address]  [place], [place]  [phone]    [facility]  [address], [place]  [phone] [facility]  [address]  [place], [place]  [phone]   [facility]  [address], [place]  [phone] [facility]  [address]  [place], [place]   [phone]   [facility]  [address]  [facility]   [phone] Behavioral Health and Wellness Clinic  [address] [address] [phone]   *[employer] Crisis: COPE: ([phone]) 24 hour mobile crisis support for people having a mental health crisis in [employer].   *Acute Psychiatric Services ([phone]). 24-hour walk-in crisis psychiatric support at [facility]; Emergency Medications Clinic available 7:30am - 2:00pm  *Crisis Connection: ([phone]) 24-hour confidential telephone counseling   *[facility] Emergency Room: [phone]  *[place] [facility] ([facility]) : [facility] connects people seeking recovery to resources that help foster and sustain long-term recovery. Whether you are seeking resources for treatment, transportation, housing, job training, education, health or other pathways to recovery, [facility] is a great place to start. [phone] [email or web address]     Follow-up with your case management team as we discussed

## 2022-04-13 NOTE — ED PROVIDER NOTES
"     Emergency Department Patient Sign-out       Brief HPI:  This is a 21 year old female signed out to me by Dr. Hammer .  See initial ED Provider note for details of the presentation.          Patient is medically cleared for admission to a Behavioral Health unit.      The patient is on a hold.  The type of hold is revocation of commitment.      The patient has not required medication for agitation.    Awaiting Transfer to Mental Health Facility        Significant Events prior to my assuming care: Patient to presents via police due to mental health symptoms and on revocation of provisional discharge.  Has been medically cleared and is awaiting inpatient bed placement.      Exam:   Patient Vitals for the past 24 hrs:   BP Temp Temp src Pulse Resp SpO2 Height Weight   04/13/22 0149 (!) 129/93 98.7  F (37.1  C) Oral 63 16 100 % 1.803 m (5' 11\") 79.4 kg (175 lb)           ED RESULTS:   Results for orders placed or performed during the hospital encounter of 04/13/22 (from the past 24 hour(s))   Asymptomatic COVID-19 Virus (Coronavirus) by PCR Oropharynx     Status: Normal    Collection Time: 04/13/22  3:03 AM    Specimen: Oropharynx; Swab   Result Value Ref Range    SARS CoV2 PCR Negative Negative    Narrative    Testing was performed using the hemal  SARS-CoV-2 & Influenza A/B Assay on the hemal  Jadyn  System.  This test should be ordered for the detection of SARS-COV-2 in individuals who meet SARS-CoV-2 clinical and/or epidemiological criteria. Test performance is unknown in asymptomatic patients.  This test is for in vitro diagnostic use under the FDA EUA for laboratories certified under CLIA to perform moderate and/or high complexity testing. This test has not been FDA cleared or approved.  A negative test does not rule out the presence of PCR inhibitors in the specimen or target RNA in concentration below the limit of detection for the assay. The possibility of a false negative should be considered if the patient's " recent exposure or clinical presentation suggests COVID-19.  [facility] are certified under the Clinical Laboratory Improvement Amendments of 1988 (CLIA-88) as qualified to perform moderate and/or high complexity laboratory testing.       ED MEDICATIONS:   Medications - No data to display      Impression:    ICD-10-CM    1. Involuntary commitment  Z04.6        Plan:    Pending studies include none.    Addendum:  I was contacted by the patient's .  Apparently, the patient's revocation of provisional discharge occurred 2 weeks ago in the context of some acute emotional dysregulation and noncompliance related possibly to an inciting event and some substance use.  The patient has actually returned to her baseline and has been more compliant recently, however the order for revocation was still in place.  As the patient is now in good emotional regulation and has returned to baseline and is in agreement with outpatient follow-up including medications, appointments, and other conditions of discharge they are asking if she can be provisionally discharged through the ED.  I discussed with Dr. Doyle, who is the physician with her case management team and we are all in agreement that the patient is appropriate to be discharged back into the community with a provisional discharge reinitiated.  Paperwork was filled out and faxed to the [facility] and the patient was discharged home.  Case management team plans to follow-up with her to provide medications and support 1-2 times daily for the next several days and going forward.  Patient appears appropriate to be discharged and would like to be discharged.    MD Arabella Sosa David, MD  04/13/22 4157       Michele Bell MD  04/13/22 5966

## 2022-04-13 NOTE — TELEPHONE ENCOUNTER
Patient cleared and ready for behavioral bed placement: Yes     S: 22 y/o female presented to the [facility] ED with police due to an Apprehend and Hold Order.    B: Hx bipolar 1, PTSD, psychosis.  Police were called by a passerby after pt was singing while sheltering in a vestibule from the rain.  Police ran her information and apprehended her once they became aware of the hold.  Pt is not forthcoming with information regarding her current mental status.  Denied SI or HI.  Does not appear to be psychotic at this time.  Pt's whereabouts prior to tonight are unknown at this time.  Per chart review, pt was admitted to [facility] from 11/9/21-12/3/21 due to bipolar 1 with psychotic features.  Pt was discharged to an IRTS facility.  Hx of using THC and occasional alcohol.    A: Apprehend and Hold Order.  Stay of Commitment was revoked on 4/1/22.  Committed to [facility].  Paperwork has been scanned into [employer].    No acute medical concerns reported.  COVID has been ordered.  Drug screen and HCG have been ordered.    R: Placed on wait list pending bed availability.   Product 4 Application Directions: Apply to the pigmented areas QHS

## 2022-08-22 ENCOUNTER — OFFICE VISIT (OUTPATIENT)
Dept: URGENT CARE | Facility: URGENT CARE | Age: 22
End: 2022-08-22
Payer: COMMERCIAL

## 2022-08-22 VITALS
HEART RATE: 79 BPM | DIASTOLIC BLOOD PRESSURE: 75 MMHG | BODY MASS INDEX: 27.45 KG/M2 | TEMPERATURE: 98.6 F | SYSTOLIC BLOOD PRESSURE: 133 MMHG | WEIGHT: 196.8 LBS | OXYGEN SATURATION: 99 %

## 2022-08-22 DIAGNOSIS — S82.891A MALLEOLAR FRACTURE, RIGHT, CLOSED, INITIAL ENCOUNTER: ICD-10-CM

## 2022-08-22 DIAGNOSIS — S89.301A DISPLACED PHYSEAL FRACTURE OF DISTAL END OF RIGHT FIBULA, INITIAL ENCOUNTER: ICD-10-CM

## 2022-08-22 DIAGNOSIS — M25.571 PAIN IN JOINT, ANKLE AND FOOT, RIGHT: Primary | ICD-10-CM

## 2022-08-22 PROCEDURE — 99213 OFFICE O/P EST LOW 20 MIN: CPT | Performed by: PHYSICIAN ASSISTANT

## 2022-08-22 NOTE — PROGRESS NOTES
Assessment & Plan     Pain in joint, ankle and foot, right  - XR Ankle Right G/E 3 Views; Future  - Aluminum Crutches Adult    Displaced physeal fracture of distal end of right fibula, initial encounter  - AIR CAST ANKLE BRACE RT FT LRG  - Orthopedic  Referral; Future  - Aluminum Crutches Adult    Malleolar fracture, right, closed, initial encounter  - AIR CAST ANKLE BRACE RT FT LRG  - Orthopedic  Referral; Future  - Aluminum Crutches Adult      We discussed that she needs to be non weight bearing. She was given an Aircast and crutches.  Orthopedic  referral for further evaluation.      30 minutes spent on the date of the encounter doing chart review, patient visit, and documentation     Return in about 1 week (around 8/29/2022) for consult with orthopedics.     Lori hCavira PA-C  [facility]    Subjective   Bibiana Andino is a 22 year old who presents for the following health issues     Patient presents with:  Ankle Pain: Possible sprained pt did a flip with heels on in wood chips pt said she heard something pop, pt has been elevating ankle, took 4 Ibuprofen and icing ankle, happened yesterday morning foot is swollen has gone down a little since yesterday    HPI    Bibiana presents to clinic today for pain in her right ankle.  Yesterday she was wearing high heels and standing in woodchips.  She did a flip, landed poorly and twisted her ankle and felt something pop.  This happened yesterday morning.  She was able to walk home after it happened.  She notes that the swelling in her ankle has gone down a little bit today but is still painful to walk on.  She rates her pain as a 1 out of 10 at rest and a 3 or 4 out of 10 when walking.  Yesterday when it happened, she states her pain was about a 7 out of 10. She works as a pole dancer and is not able to work with the pain she has at this time.    Review of Systems   ROS negative except as stated above.      Objective     /75 (BP Location: Left arm, Patient Position: Sitting, Cuff Size: Adult Regular)   Pulse 79   Temp 98.6  F (37  C) (Axillary)   Wt 89.3 kg (196 lb 12.8 oz)   SpO2 99%   BMI 27.45 kg/m    Physical Exam   GENERAL: healthy, alert and no distress  MS: right ankle with significant swelling over the lateral malleolus.  Decreased ROM, 3+ edema.  No pain to palpation of foot, pain with palpation over lateral malleolus    Results for orders placed or performed in visit on 08/22/22   XR Ankle Right G/E 3 Views     Status: None    Narrative    ANKLE RIGHT THREE OR MORE VIEWS   8/22/2022 1:34 PM     HISTORY:  Pain in joint, ankle and foot, right.    COMPARISON: None.      Impression    IMPRESSION:   1. Oblique fracture in the distal metadiaphysis of the fibula. This  courses from the ankle joint line superolaterally. There is up to 2.5  mm of displacement. No significant angulation.  2. Small minimally displaced posterior malleolar fracture is impacted  and displaced up to 1 mm.  3. Lateral soft tissue swelling.  4. Otherwise negative.     ALLEN WANG MD         SYSTEM ID:  [id number]

## 2022-08-23 ENCOUNTER — TELEPHONE (OUTPATIENT)
Dept: URGENT CARE | Facility: CLINIC | Age: 22
End: 2022-08-23

## 2022-08-23 DIAGNOSIS — S89.301A DISPLACED PHYSEAL FRACTURE OF DISTAL END OF RIGHT FIBULA, INITIAL ENCOUNTER: Primary | ICD-10-CM

## 2022-08-23 NOTE — TELEPHONE ENCOUNTER
Reason for Call:  Medication or medication refill:    Do you use a [facility]?  Name of the pharmacy and phone number for the current request:  [facility] [address] [place] [address]  [phone]    Name of the medication requested: pain medication    Other request: pt was seen in  yesterday for rt ankle pain. Was given a boot and crutches, no pain medication. PT nurse is calling to see if something can be prescribed as PT is rating pain 5/10.    Can we leave a detailed message on this number? YES    Phone number patient can be reached at: Other phone number:  [phone]    Best Time: anytime before 5pm    Call taken on 8/23/2022 at 11:19 AM by Taylor Jones

## 2022-08-25 RX ORDER — IBUPROFEN 600 MG/1
600 TABLET, FILM COATED ORAL EVERY 6 HOURS PRN
Qty: 40 TABLET | Refills: 0 | Status: SHIPPED | OUTPATIENT
Start: 2022-08-25 | End: 2024-06-04

## 2022-08-25 RX ORDER — ACETAMINOPHEN 500 MG
500-1000 TABLET ORAL EVERY 6 HOURS PRN
Qty: 40 TABLET | Refills: 0 | Status: SHIPPED | OUTPATIENT
Start: 2022-08-25 | End: 2023-11-16

## 2022-08-26 NOTE — TELEPHONE ENCOUNTER
Please call Bibiana to let her know Tylenol and ibuprofen are the best option to control her pain. She may take up to 1000 mg of Tylenol every 6-8 hours (Do not exceed 3000 mg in 24 hours)  She may also take up to 600 mg ibuprofen every 6 hours.    I sent prescriptions for both of these to her [facility] pharmacy in [facility] if she'd like to fill them there.    Azucena Chavira PA-C  [facility] [employer]

## 2022-09-07 ENCOUNTER — ANCILLARY PROCEDURE (OUTPATIENT)
Dept: GENERAL RADIOLOGY | Facility: CLINIC | Age: 22
End: 2022-09-07
Attending: PEDIATRICS
Payer: COMMERCIAL

## 2022-09-07 ENCOUNTER — OFFICE VISIT (OUTPATIENT)
Dept: ORTHOPEDICS | Facility: CLINIC | Age: 22
End: 2022-09-07
Attending: PHYSICIAN ASSISTANT

## 2022-09-07 VITALS
SYSTOLIC BLOOD PRESSURE: 130 MMHG | DIASTOLIC BLOOD PRESSURE: 76 MMHG | BODY MASS INDEX: 28 KG/M2 | WEIGHT: 200 LBS | HEIGHT: 71 IN

## 2022-09-07 DIAGNOSIS — S89.301A DISPLACED PHYSEAL FRACTURE OF DISTAL END OF RIGHT FIBULA, INITIAL ENCOUNTER: ICD-10-CM

## 2022-09-07 DIAGNOSIS — S82.891A MALLEOLAR FRACTURE, RIGHT, CLOSED, INITIAL ENCOUNTER: ICD-10-CM

## 2022-09-07 DIAGNOSIS — S82.831A OTHER CLOSED FRACTURE OF DISTAL END OF RIGHT FIBULA, INITIAL ENCOUNTER: ICD-10-CM

## 2022-09-07 DIAGNOSIS — S82.391A CLOSED FRACTURE OF POSTERIOR MALLEOLUS OF RIGHT TIBIA, INITIAL ENCOUNTER: ICD-10-CM

## 2022-09-07 PROCEDURE — 73610 X-RAY EXAM OF ANKLE: CPT | Mod: TC | Performed by: RADIOLOGY

## 2022-09-07 PROCEDURE — 99244 OFF/OP CNSLTJ NEW/EST MOD 40: CPT | Performed by: PEDIATRICS

## 2022-09-07 NOTE — PROGRESS NOTES
ASSESSMENT & PLAN    Bibiana was seen today for injury.    Diagnoses and all orders for this visit:    Other closed fracture of distal end of right fibula, initial encounter  -     Orthopedic  Referral  -     XR Ankle Right G/E 3 Views; Future  -     Ankle/Foot Bracing Supplies Order for DME - ONLY FOR DME    Closed fracture of posterior malleolus of right tibia, initial encounter  -     Orthopedic  Referral  -     XR Ankle Right G/E 3 Views; Future  -     Ankle/Foot Bracing Supplies Order for DME - ONLY FOR DME      In addition to below, plan to touch base with foot/ankle surgeon given the fractures. I think alignment acceptable, and given already 2 weeks from injury, some healing likely present, though no callus noted yet on x-ray.  Questions answered. Discussed signs and symptoms that may indicate more serious issues; the patient was instructed to seek appropriate care if noted. Bibiana indicates understanding of these issues and agrees with the plan.      See Patient Instructions  Patient Instructions   We reviewed the updated right ankle x-rays today.  We see once again the oblique distal fibular fracture, as well as the posterior malleolar fracture.  Alignment appears similar to previous x-rays, without significant bony healing on the x-rays.  However, it is good to hear that your pain and swelling are improving.  Reviewed continued support for the ankle.  Options include casting, splinting, CAM Walker.  Plan to continue with use of Cam walker.  Provided tall cam walker today, for better support of the ankle.  Continue with routine use of the cam walker.  Would advise continued use of ambulatory aids such as crutches for at least another week or 2, given the posterior malleolar fracture.  At this time, tentatively recheck in 3-4 weeks, with repeat x-ray of the ankle.  Contact clinic sooner if any questions/concerns.    If you have any further questions for your physician or physician s care team you  "can call [phone] and use option 3 to leave a voice message. Calls received during business hours will be returned same day.        Carlos Bergman DO  [facility]      CC: Lori Chavira      -----  Chief Complaint   Patient presents with     Right Ankle - Injury       SUBJECTIVE  Bibiana Andino is a/an 22 year old female who is seen in consultation at the request of  Lori Chavira PA-C for evaluation of right ankle injury.  Doing a flip while in high heels, she landed and inverted her ankle.  Was seen at , x rays were taken, she was given a short boot and crutches.  She does feel there has been some improvement, but still notices swelling.     The patient is seen with their friend.      Onset 8/21/22: 17 day(s) ago. 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  Swelling improving.      REVIEW OF SYSTEMS:  Review of Systems   All other systems reviewed and are negative.        OBJECTIVE:  /76   Ht 1.803 m (5' 11\")   Wt 90.7 kg (200 lb)   BMI 27.89 kg/m     General: healthy, alert and in no distress  HEENT: no scleral icterus or conjunctival erythema  Skin: no suspicious lesions or rash. No jaundice.  CV: distal perfusion intact   Resp: normal respiratory effort without conversational dyspnea   Psych: normal mood and affect  Gait: cam walker, single crutch  Neuro: Normal light sensory exam of lower extremity       Right Ankle Exam:    Inspection:       Normal DP artery pulse       Normal PT artery pulse       Mild-mod lateral ankle swelling, into foot    Foot inspection:    "    no deformity noted    ROM:        limited dorsiflexion        limited plantarflexion        limited inversion        limited eversion   At least  Mild limitation throughout, some tightness with PF, DF; some lateral discomfort with inversion    Strength:  deferred    Tender:       lateral malleolus       Distal fibula    Non-Tender:       remainder of foot and ankle       deltoid ligament       medial malleolus, medial ankle    Skin:       well perfused       capillary refill brisk    Sensation:  Grossly intact to light touch    Regional pulses:  Normal      RADIOLOGY:  I independently ordered, visualized and reviewed these images with the patient  Again note oblique distal fibula fracture, level of joint, mild displacement; not significantly changed compared to previous x-rays, with mortise appearing intact.  Posterior malleolar fracture somewhat less visible, I think related to positioning.    Recent Results (from the past 24 hour(s))   XR Ankle Right G/E 3 Views    Narrative    XR ANKLE RIGHT G/E 3 VIEWS 9/7/2022 11:10 AM     HISTORY: Displaced physeal fracture of distal end of right fibula,  initial encounter; Malleolar fracture, right, closed, initial  encounter    COMPARISON: 8/22/2022.       Impression    IMPRESSION: No significant change in alignment or appearance of the  oblique mildly displaced distal fibular fracture. No interval healing  has occurred. Previously seen posterior malleolus fracture is faintly  visible. No definitive fracture of the medial malleolus.    YOLANDA CHRISTOPHER MD         SYSTEM ID:  [id number]           Previous x-rays:  Distal fibula and posterior malleolar fractures. Posterior malleolus fracture < 25% of joint surface. Mortise appears intact.    Results for orders placed or performed in visit on 08/22/22   XR Ankle Right G/E 3 Views    Narrative    ANKLE RIGHT THREE OR MORE VIEWS   8/22/2022 1:34 PM     HISTORY:  Pain in joint, ankle and foot, right.    COMPARISON: None.       Impression    IMPRESSION:   1. Oblique fracture in the distal metadiaphysis of the fibula. This  courses from the ankle joint line superolaterally. There is up to 2.5  mm of displacement. No significant angulation.  2. Small minimally displaced posterior malleolar fracture is impacted  and displaced up to 1 mm.  3. Lateral soft tissue swelling.  4. Otherwise negative.     ALLEN WANG MD         SYSTEM ID:  [id number]         Review of prior external note(s) from -   Review of the result(s) of each unique test - imaging  Independent interpretation of a test performed by another physician/other qualified health care professional (not separately reported) - imaging  Discussion of management or test interpretation with external physician/other qualified healthcare professional/appropriate source - foot/ankle

## 2022-09-07 NOTE — PATIENT INSTRUCTIONS
We reviewed the updated right ankle x-rays today.  We see once again the oblique distal fibular fracture, as well as the posterior malleolar fracture.  Alignment appears similar to previous x-rays, without significant bony healing on the x-rays.  However, it is good to hear that your pain and swelling are improving.  Reviewed continued support for the ankle.  Options include casting, splinting, CAM Walker.  Plan to continue with use of Cam walker.  Provided tall cam walker today, for better support of the ankle.  Continue with routine use of the cam walker.  Would advise continued use of ambulatory aids such as crutches for at least another week or 2, given the posterior malleolar fracture.  At this time, tentatively recheck in 3-4 weeks, with repeat x-ray of the ankle.  Contact clinic sooner if any questions/concerns.    If you have any further questions for your physician or physician s care team you can call [phone] and use option 3 to leave a voice message. Calls received during business hours will be returned same day.

## 2022-09-18 ENCOUNTER — HEALTH MAINTENANCE LETTER (OUTPATIENT)
Age: 22
End: 2022-09-18

## 2022-09-30 ENCOUNTER — OFFICE VISIT (OUTPATIENT)
Dept: ORTHOPEDICS | Facility: CLINIC | Age: 22
End: 2022-09-30

## 2022-09-30 ENCOUNTER — ANCILLARY PROCEDURE (OUTPATIENT)
Dept: GENERAL RADIOLOGY | Facility: CLINIC | Age: 22
End: 2022-09-30
Attending: PEDIATRICS
Payer: COMMERCIAL

## 2022-09-30 VITALS
DIASTOLIC BLOOD PRESSURE: 70 MMHG | HEIGHT: 71 IN | WEIGHT: 200 LBS | SYSTOLIC BLOOD PRESSURE: 116 MMHG | BODY MASS INDEX: 28 KG/M2

## 2022-09-30 DIAGNOSIS — S82.391D CLOSED FRACTURE OF POSTERIOR MALLEOLUS OF RIGHT TIBIA WITH ROUTINE HEALING, SUBSEQUENT ENCOUNTER: ICD-10-CM

## 2022-09-30 DIAGNOSIS — S82.831D OTHER CLOSED FRACTURE OF DISTAL END OF RIGHT FIBULA WITH ROUTINE HEALING, SUBSEQUENT ENCOUNTER: ICD-10-CM

## 2022-09-30 DIAGNOSIS — S82.831D OTHER CLOSED FRACTURE OF DISTAL END OF RIGHT FIBULA WITH ROUTINE HEALING, SUBSEQUENT ENCOUNTER: Primary | ICD-10-CM

## 2022-09-30 PROCEDURE — 99213 OFFICE O/P EST LOW 20 MIN: CPT | Performed by: PEDIATRICS

## 2022-09-30 PROCEDURE — 73610 X-RAY EXAM OF ANKLE: CPT | Mod: TC | Performed by: RADIOLOGY

## 2022-09-30 NOTE — LETTER
9/30/2022         RE: Bibiana Andino  [address]  [place] [address]        Dear Colleague,    Thank you for referring your patient, Bibiana Andino, to the [facility]. Please see a copy of my visit note below.    ASSESSMENT & PLAN    Bibiana was seen today for recheck.    Diagnoses and all orders for this visit:    Other closed fracture of distal end of right fibula with routine healing, subsequent encounter  -     XR Ankle Right G/E 3 Views; Future  -     Physical Therapy Referral; Future  -     Ankle/Foot Bracing Supplies Order for DME - ONLY FOR DME    Closed fracture of posterior malleolus of right tibia with routine healing, subsequent encounter  -     XR Ankle Right G/E 3 Views; Future  -     Physical Therapy Referral; Future  -     Ankle/Foot Bracing Supplies Order for DME - ONLY FOR DME        See Patient Instructions  Patient Instructions   X-rays of the right ankle today demonstrate interval healing of the distal fibular fracture, and the posterior malleolar fracture is difficult to see as well, consistent with healing.  Okay to continue with weaning out of the cam walker.  Discussed transition into functional ankle brace.  Suggest brace use when up and moving for any significant period of time, especially if out of the house, but otherwise may use as desired.  This would be over the next few weeks.  Also plan referral to physical therapy, placed today.  Monitor course with PT over the next 3 to 4 weeks to start.  If any persistent symptoms at that time, or if any questions or concerns in the meantime, reach out to the clinic, likely would suggest recheck if that is the case.      If you have any further questions for your physician or physician s care team you can call [phone] and use option 3 to leave a voice message. Calls received during business hours will be returned same day.        Carlos Bergman DO  [facility]  "[facility]    SUBJECTIVE- Interim History September 30, 2022    Chief Complaint   Patient presents with     Right Ankle - RECHECK       Bibiana Andino is a 22 year old female who is seen in f/u up for    Other closed fracture of distal end of right fibula with routine healing, subsequent encounter  Closed fracture of posterior malleolus of right tibia with routine healing, subsequent encounter. Since last visit on 9/7/22 patient has continued with the CAM boot.  Has attempted to walk without it, and states it is mostly that she gets tired, but feels there has been improvement.   .  - 8/21/22 Now ~ 5.5 from initial onset        REVIEW OF SYSTEMS:  Review of Systems      OBJECTIVE:  /70   Ht 1.803 m (5' 11\")   Wt 90.7 kg (200 lb)   BMI 27.89 kg/m       Cam walker fitting well, in good repair    RADIOLOGY:  Final results and radiologist's interpretation, available in the [facility] health record.  Images were reviewed with the patient in the office today.  My personal interpretation of the performed imaging: healing distal fibula and posterior malleolar fractures.    Recent Results (from the past 24 hour(s))   XR Ankle Right G/E 3 Views    Narrative    EXAM: XR ANKLE RIGHT G/E 3 VIEWS  DATE/TIME: 9/30/2022 3:16 PM     INDICATION: Right distal ulna fracture.   COMPARISON: 9/7/2022.      Impression    IMPRESSION:  1.  Healing fracture of the right ulnar distal metaphysis. No change  in alignment or orientation. There is new callus formation at the  fracture margins.  2.  Normal ankle joint spacing and alignment.  3.  Remainder unchanged are unremarkable.    LANDON HEBERT MD         SYSTEM ID:  [id number]             Again, thank you for allowing me to participate in the care of your patient.        Sincerely,        Carlos Bergman DO    "

## 2022-09-30 NOTE — PROGRESS NOTES
ASSESSMENT & PLAN    Bibiana was seen today for recheck.    Diagnoses and all orders for this visit:    Other closed fracture of distal end of right fibula with routine healing, subsequent encounter  -     XR Ankle Right G/E 3 Views; Future  -     Physical Therapy Referral; Future  -     Ankle/Foot Bracing Supplies Order for DME - ONLY FOR DME    Closed fracture of posterior malleolus of right tibia with routine healing, subsequent encounter  -     XR Ankle Right G/E 3 Views; Future  -     Physical Therapy Referral; Future  -     Ankle/Foot Bracing Supplies Order for DME - ONLY FOR DME        See Patient Instructions  Patient Instructions   X-rays of the right ankle today demonstrate interval healing of the distal fibular fracture, and the posterior malleolar fracture is difficult to see as well, consistent with healing.  Okay to continue with weaning out of the cam walker.  Discussed transition into functional ankle brace.  Suggest brace use when up and moving for any significant period of time, especially if out of the house, but otherwise may use as desired.  This would be over the next few weeks.  Also plan referral to physical therapy, placed today.  Monitor course with PT over the next 3 to 4 weeks to start.  If any persistent symptoms at that time, or if any questions or concerns in the meantime, reach out to the clinic, likely would suggest recheck if that is the case.      If you have any further questions for your physician or physician s care team you can call [phone] and use option 3 to leave a voice message. Calls received during business hours will be returned same day.        Carlos Bergman [facility]    SUBJECTIVE- Interim History September 30, 2022    Chief Complaint   Patient presents with     Right Ankle - RECHECK       Bibiana Andino is a 22 year old female who is seen in f/u up for    Other closed fracture of distal end of right fibula with routine  "healing, subsequent encounter  Closed fracture of posterior malleolus of right tibia with routine healing, subsequent encounter. Since last visit on 9/7/22 patient has continued with the CAM boot.  Has attempted to walk without it, and states it is mostly that she gets tired, but feels there has been improvement.   .  - 8/21/22 Now ~ 5.5 from initial onset        REVIEW OF SYSTEMS:  Review of Systems      OBJECTIVE:  /70   Ht 1.803 m (5' 11\")   Wt 90.7 kg (200 lb)   BMI 27.89 kg/m       Cam walker fitting well, in good repair    RADIOLOGY:  Final results and radiologist's interpretation, available in the [facility] health record.  Images were reviewed with the patient in the office today.  My personal interpretation of the performed imaging: healing distal fibula and posterior malleolar fractures.    Recent Results (from the past 24 hour(s))   XR Ankle Right G/E 3 Views    Narrative    EXAM: XR ANKLE RIGHT G/E 3 VIEWS  DATE/TIME: 9/30/2022 3:16 PM     INDICATION: Right distal ulna fracture.   COMPARISON: 9/7/2022.      Impression    IMPRESSION:  1.  Healing fracture of the right ulnar distal metaphysis. No change  in alignment or orientation. There is new callus formation at the  fracture margins.  2.  Normal ankle joint spacing and alignment.  3.  Remainder unchanged are unremarkable.    LANDON HEBERT MD         SYSTEM ID:  [id number]         "

## 2022-09-30 NOTE — PATIENT INSTRUCTIONS
X-rays of the right ankle today demonstrate interval healing of the distal fibular fracture, and the posterior malleolar fracture is difficult to see as well, consistent with healing.  Okay to continue with weaning out of the cam walker.  Discussed transition into functional ankle brace.  Suggest brace use when up and moving for any significant period of time, especially if out of the house, but otherwise may use as desired.  This would be over the next few weeks.  Also plan referral to physical therapy, placed today.  Monitor course with PT over the next 3 to 4 weeks to start.  If any persistent symptoms at that time, or if any questions or concerns in the meantime, reach out to the clinic, likely would suggest recheck if that is the case.      If you have any further questions for your physician or physician s care team you can call [phone] and use option 3 to leave a voice message. Calls received during business hours will be returned same day.

## 2022-10-10 ENCOUNTER — APPOINTMENT (OUTPATIENT)
Dept: LAB | Facility: CLINIC | Age: 22
End: 2022-10-10
Payer: COMMERCIAL

## 2022-10-11 ENCOUNTER — TELEPHONE (OUTPATIENT)
Dept: NURSING | Facility: CLINIC | Age: 22
End: 2022-10-11

## 2022-10-11 NOTE — TELEPHONE ENCOUNTER
Coronavirus (COVID-19) Notification    Caller Name (Patient, parent, daughter/son, grandparent, etc)  self    Reason for call  Notify of Positive Coronavirus (COVID-19) lab results, assess symptoms,  review [facility] recommendations    Lab Result    Lab test:  2019-nCoV rRt-PCR or SARS-CoV-2 PCR    Oropharyngeal AND/OR nasopharyngeal swabs is POSITIVE for 2019-nCoV RNA/SARS-COV-2 PCR (COVID-19 virus)    { Introduce self then review script  I am calling on behalf of [facility].  We were notified that your Coronavirus test (COVID-19) was POSITIVE for the virus  :  Gather patient reported symptoms   Assessment   Current Symptoms at time of phone call, reported by patient: (if no symptoms, document: No symptoms] no   Date of symptom(s) onset (if applicable) 7 days     If at time of call, Patients symptoms have worsened, the Patient should contact 911 or have someone drive them to Emergency Dept promptly:      If Patient calling 911, inform 911 personal that you have tested positive for the Coronavirus (COVID-19).  Place mask on and await 911 to arrive.    If Emergency Dept, If possible, please have another adult drive you to the Emergency Dept but you need to wear mask when in contact with other people.      Treatment Options:   Patient classified as COVID treatment eligible by Epic high risk algorithm: Yes  Is the patient symptomatic at the time of result notification? No    Review information with Patient    Your result was positive. This means you have COVID-19 (coronavirus).    How can I protect others?    These guidelines are for isolating before returning to work, school or .    If you DO have symptoms    Stay home and away from others     For at least 5 days after your symptoms started, AND    You are fever free for 24 hours (with no medicine that reduces fever), AND    Your other symptoms are better    Wear a mask for 10 full days anytime you are around others    If you DON'T have  symptoms    Stay home and away from others for at least 5 days after your positive test    Wear a mask for 10 full days anytime you are around others    There may be different guidelines for healthcare facilities.  Please check with the specific sites before arriving.    If you have been told by a doctor that you were severely ill with COVID-19 or are immunocompromised, you should isolate for at least 10 days.    You should not go back to work until you meet the guidelines above for ending your home isolation. You don't need to be retested for COVID-19 before going back to work--studies show that you won't spread the virus if it's been at least 10 days since your symptoms started (or 20 days, if you have a weak immune system).    Employers, schools, and daycares: This is an official notice for this person's medical guidelines for returning in-person.  They must meet the above guidelines before going back to work, school or  in person.    You will receive a positive COVID-19 letter via [employer] or the mail soon with additional self-care information.    Would you like me to review some of that information with you now?  No    If you were tested for an upcoming procedure, please contact your provider for next steps.    Rebecca Alaniz

## 2022-11-10 ENCOUNTER — OFFICE VISIT (OUTPATIENT)
Dept: OBGYN | Facility: CLINIC | Age: 22
End: 2022-11-10
Payer: COMMERCIAL

## 2022-11-10 VITALS
WEIGHT: 198.2 LBS | SYSTOLIC BLOOD PRESSURE: 116 MMHG | BODY MASS INDEX: 27.64 KG/M2 | HEART RATE: 84 BPM | OXYGEN SATURATION: 98 % | DIASTOLIC BLOOD PRESSURE: 72 MMHG

## 2022-11-10 DIAGNOSIS — Z00.00 WELL WOMAN EXAM (NO GYNECOLOGICAL EXAM): Primary | ICD-10-CM

## 2022-11-10 DIAGNOSIS — Z11.3 SCREEN FOR STD (SEXUALLY TRANSMITTED DISEASE): ICD-10-CM

## 2022-11-10 DIAGNOSIS — Z13.29 SCREENING FOR THYROID DISORDER: ICD-10-CM

## 2022-11-10 DIAGNOSIS — Z30.09 BIRTH CONTROL COUNSELING: ICD-10-CM

## 2022-11-10 DIAGNOSIS — Z23 NEED FOR PROPHYLACTIC VACCINATION AND INOCULATION AGAINST INFLUENZA: ICD-10-CM

## 2022-11-10 PROBLEM — F31.70 BIPOLAR AFFECTIVE DISORDER IN REMISSION (H): Status: ACTIVE | Noted: 2021-09-29

## 2022-11-10 PROBLEM — U07.1 COVID-19: Status: ACTIVE | Noted: 2021-10-01

## 2022-11-10 PROBLEM — F33.3 SEVERE EPISODE OF RECURRENT MAJOR DEPRESSIVE DISORDER, WITH PSYCHOTIC FEATURES (H): Status: ACTIVE | Noted: 2021-09-29

## 2022-11-10 PROBLEM — F31.12 BIPOLAR I DISORDER, MOST RECENT EPISODE (OR CURRENT) MANIC, MODERATE (H): Status: ACTIVE | Noted: 2022-11-10

## 2022-11-10 PROBLEM — R45.851 SUICIDAL IDEATION: Status: ACTIVE | Noted: 2021-09-29

## 2022-11-10 LAB
HCG UR QL: NEGATIVE
TSH SERPL DL<=0.005 MIU/L-ACNC: 0.74 MU/L (ref 0.4–4)

## 2022-11-10 PROCEDURE — 84443 ASSAY THYROID STIM HORMONE: CPT | Performed by: OBSTETRICS & GYNECOLOGY

## 2022-11-10 PROCEDURE — 36415 COLL VENOUS BLD VENIPUNCTURE: CPT | Performed by: OBSTETRICS & GYNECOLOGY

## 2022-11-10 PROCEDURE — 87389 HIV-1 AG W/HIV-1&-2 AB AG IA: CPT | Performed by: OBSTETRICS & GYNECOLOGY

## 2022-11-10 PROCEDURE — 87591 N.GONORRHOEAE DNA AMP PROB: CPT | Performed by: OBSTETRICS & GYNECOLOGY

## 2022-11-10 PROCEDURE — 81025 URINE PREGNANCY TEST: CPT | Performed by: OBSTETRICS & GYNECOLOGY

## 2022-11-10 PROCEDURE — 90471 IMMUNIZATION ADMIN: CPT | Performed by: OBSTETRICS & GYNECOLOGY

## 2022-11-10 PROCEDURE — 90686 IIV4 VACC NO PRSV 0.5 ML IM: CPT | Performed by: OBSTETRICS & GYNECOLOGY

## 2022-11-10 PROCEDURE — 99395 PREV VISIT EST AGE 18-39: CPT | Mod: 25 | Performed by: OBSTETRICS & GYNECOLOGY

## 2022-11-10 PROCEDURE — 87491 CHLMYD TRACH DNA AMP PROBE: CPT | Performed by: OBSTETRICS & GYNECOLOGY

## 2022-11-10 PROCEDURE — 86780 TREPONEMA PALLIDUM: CPT | Performed by: OBSTETRICS & GYNECOLOGY

## 2022-11-10 PROCEDURE — 86803 HEPATITIS C AB TEST: CPT | Performed by: OBSTETRICS & GYNECOLOGY

## 2022-11-10 RX ORDER — PHENOL 1.4 %
10 AEROSOL, SPRAY (ML) MUCOUS MEMBRANE
COMMUNITY
End: 2024-09-18

## 2022-11-10 RX ORDER — DESOGESTREL AND ETHINYL ESTRADIOL 0.15-0.03
1 KIT ORAL DAILY
Qty: 84 TABLET | Refills: 3 | Status: SHIPPED | OUTPATIENT
Start: 2022-11-10 | End: 2022-11-10

## 2022-11-10 RX ORDER — CETIRIZINE HYDROCHLORIDE 10 MG/1
TABLET ORAL
COMMUNITY
Start: 2022-10-04 | End: 2023-11-16

## 2022-11-10 RX ORDER — HYDROXYZINE PAMOATE 50 MG/1
CAPSULE ORAL
COMMUNITY
Start: 2022-11-01 | End: 2023-11-16

## 2022-11-10 RX ORDER — LITHIUM CARBONATE 300 MG/1
600 TABLET, FILM COATED, EXTENDED RELEASE ORAL
COMMUNITY
End: 2024-04-26

## 2022-11-10 RX ORDER — DESOGESTREL AND ETHINYL ESTRADIOL 0.15-0.03
1 KIT ORAL DAILY
Qty: 84 TABLET | Refills: 3 | Status: SHIPPED | OUTPATIENT
Start: 2022-11-10 | End: 2023-11-16

## 2022-11-10 NOTE — PROGRESS NOTES
Bibiana is a 22 year old  female who presents for annual exam.     Menses are regular q 28-30 days and normal lasting 4 days.  Menses flow: normal and light.  Patient's last menstrual period was 10/17/2022 (approximate).. Using oral contraceptives for contraception.  She is not currently considering pregnancy.  She notes at times she forgets to take the pills or takes them late.  She is starting to try setting an alarm.  She is interested in an alternative birth control form that would be easier to remember to take.  Besides routine health maintenance,  she would like to discuss birth control. Patient denies any vaginal concerns, vaginal discharge, vaginal irritation, pelvic pain, or pelvic concerns.  GYNECOLOGIC HISTORY:  Menarche: 14  Age at first intercourse: 15  Bibiana is sexually active with male partner(s) and is currently in monogamous relationship with Partner.    History sexually transmitted infections:No STD history  STI testing offered?  Accepted  PENNIE exposure: No  History of abnormal Pap smear: NO - age 21-29 PAP every 3 years recommended  Family history of breast CA: No  Family history of uterine/ovarian CA: No    Family history of colon CA: Unknown - Maybe maternal grandfather     HEALTH MAINTENANCE:  Cholesterol: (No results found for: CHOL History of abnormal lipids: No  Mammo: N/a . History of abnormal Mammo: YES, No, Not applicable.  Regular Self Breast Exams: No  Calcium/Vitamin D intake: source:  Both. Adequate? Yes  TSH: (No results found for: TSH )  22 NILM  Patient reports [facility]  Does not [facility].  Will check into it and bring to future visit.    HISTORY:  OB History    Para Term  AB Living   0 0 0 0 0 0   SAB IAB Ectopic Multiple Live Births   0 0 0 0 0     Past Medical History:   Diagnosis Date     PTSD (post-traumatic stress disorder)      Vaginal pain    PSH- none    Social History     Denies any  tobacco, alcohol, or drug use.  Describes herself as active and healthy 2 meals per day.  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  Also explores music for her creative outlet    Current Outpatient Medications:      cholecalciferol (VITAMIN D3) 10 mcg (400 units) TABS tablet, Take 400 Units by mouth, Disp: , Rfl:      cholecalciferol (VITAMIN D3) 125 mcg (5000 units) capsule, Take 1 capsule (125 mcg) by mouth daily, Disp: 100 capsule, Rfl: PRN     desogestrel-ethinyl estradiol (APRI) 0.15-30 MG-MCG tablet, Take 1 tablet by mouth daily, Disp: 84 tablet, Rfl: 3     divalproex sodium extended-release (DEPAKOTE ER) 500 MG 24 hr tablet, Take 2 tablets (1,000 mg) by mouth At Bedtime, Disp: 60 tablet, Rfl: 1     hydrOXYzine (VISTARIL) 50 MG capsule, , Disp: , Rfl:      Melatonin 10 MG TABS tablet, Take 10 mg by mouth, Disp: , Rfl:      QUEtiapine (SEROQUEL) 300 MG tablet, Take 1 tablet (300 mg) by mouth At Bedtime, Disp: 30 tablet, Rfl: 1     vitamin C (ASCORBIC ACID) 1000 MG TABS, Take 2 tablets (2,000 mg) by mouth daily, Disp: , Rfl:      acetaminophen (TYLENOL) 500 MG tablet, Take 1-2 tablets (500-1,000 mg) by mouth every 6 hours as needed (pain) Take no more than 3000 mg in 24 hours (Patient not taking: Reported on 11/10/2022), Disp: 40 tablet, Rfl: 0     cetirizine (ZYRTEC) 10 MG tablet, , Disp: , Rfl:      ibuprofen (ADVIL/MOTRIN) 600 MG tablet, Take 1 tablet (600 mg) by mouth every 6 hours as needed for moderate pain (Patient not taking: Reported on 11/10/2022), Disp: 40 tablet, Rfl: 0     lithium ER (LITHOBID) 300 MG CR tablet, Take 600 mg by mouth (Patient not taking: Reported on 11/10/2022), Disp: , Rfl:      Multiple Vitamins-Minerals (CENTRUM WOMEN) TABS, Take 1 tablet by mouth daily (Patient not taking: Reported on 11/10/2022), Disp: , Rfl:    No Known Allergies    Past medical, surgical, social and family history were reviewed and updated in EPIC.    EXAM:  Pulse 84   Wt 89.9 kg  (198 lb 3.2 oz)   LMP 10/17/2022 (Approximate)   SpO2 98%   BMI 27.64 kg/m     BMI: Body mass index is 27.64 kg/m .  Constitutional: healthy, alert and no distress  Head: Normocephalic. No masses, lesions, tenderness or abnormalities.  Slightly prominent bilateral globes  Neck: Neck supple. Trachea midline. No adenopathy. Thyroid symmetric, normal size.   Cardiovascular: regular rate and rhythm  Respiratory: clear to auscultation bilaterally .   Breasts: normal without suspicious masses, skin changes or axillary nodes.  Gastrointestinal: Abdomen soft, non-tender, non-distended. No masses, organomegaly.  : not indicated  Musculoskeletal: extremities normal  Skin: no suspicious lesions or rashes  Psychiatric: Affect appropriate, cooperative,mentation appears normal.         ASSESSMENT:  22 year old female with satisfactory annual exam  (Z00.00) Well woman exam (no gynecological exam)  (primary encounter diagnosis)  COUNSELING:   Reviewed preventive health counseling, as reflected in patient instructions       Regular exercise       Healthy diet/nutrition       Contraception- Discussed specifically depo provera, Nexplanon, and IUD.  Reviewed pros and cons of each and expected bleeding with each.  Patient is not interested in IUD at this time.  She is interested in depo provera and Nexplanon.  Would like to think more about before making a decision.  Will continue OCPs now and contact office if she decides to switch.       Safe sex practices/STD prevention       Consider Hep C screening for all patients one time for ages 18-79 years       HIV screeninx in teen years, 1x in adult years, and at intervals if high risk   reports that she has never smoked. She has never used smokeless tobacco.    Body mass index is 27.64 kg/m .  Weight management plan: Discussed healthy diet and exercise guidelines      (Z11.3) Screen for STD (sexually transmitted disease)  Comment: requests full panel screening  Plan: NEISSERIA  GONORRHOEA PCR, CHLAMYDIA TRACHOMATIS        PCR, HIV Antigen Antibody Combo, Hepatitis C         Screen Reflex to HCV RNA Quant and Genotype,         Treponema Abs w Reflex to RPR and Titer    (Z30.09) Birth control counseling  Comment: birth control refilled  Plan: HCG Qual, Urine ([id number]), desogestrel-ethinyl         estradiol (APRI) 0.15-30 MG-MCG tablet    (Z13.29) Screening for thyroid disorder  Comment: Patient with slightly prominent globes, borderline exophthalmos.  Will screen for thyroid disorder.  Plan: TSH with free T4 reflex    -Will not give the COVID19 booster today given recent COVID19 positive (10/10/22).  Recommend booster 3 months after positive testing.  Will get flu shot today.          Kasia Cuevas MD

## 2022-11-11 LAB
C TRACH DNA SPEC QL NAA+PROBE: POSITIVE
HCV AB SERPL QL IA: NONREACTIVE
HIV 1+2 AB+HIV1 P24 AG SERPL QL IA: NONREACTIVE
N GONORRHOEA DNA SPEC QL NAA+PROBE: NEGATIVE
T PALLIDUM AB SER QL: NONREACTIVE

## 2022-11-14 ENCOUNTER — TELEPHONE (OUTPATIENT)
Dept: FAMILY MEDICINE | Facility: CLINIC | Age: 22
End: 2022-11-14

## 2022-12-08 ENCOUNTER — OFFICE VISIT (OUTPATIENT)
Dept: OBGYN | Facility: CLINIC | Age: 22
End: 2022-12-08
Payer: COMMERCIAL

## 2022-12-08 VITALS
HEART RATE: 80 BPM | WEIGHT: 200.2 LBS | BODY MASS INDEX: 27.92 KG/M2 | OXYGEN SATURATION: 100 % | SYSTOLIC BLOOD PRESSURE: 137 MMHG | DIASTOLIC BLOOD PRESSURE: 52 MMHG

## 2022-12-08 DIAGNOSIS — A74.9 CHLAMYDIA: ICD-10-CM

## 2022-12-08 DIAGNOSIS — Z30.09 BIRTH CONTROL COUNSELING: Primary | ICD-10-CM

## 2022-12-08 LAB — HCG UR QL: NEGATIVE

## 2022-12-08 PROCEDURE — 81025 URINE PREGNANCY TEST: CPT | Performed by: OBSTETRICS & GYNECOLOGY

## 2022-12-08 PROCEDURE — 99213 OFFICE O/P EST LOW 20 MIN: CPT | Mod: 25 | Performed by: OBSTETRICS & GYNECOLOGY

## 2022-12-08 PROCEDURE — 96372 THER/PROPH/DIAG INJ SC/IM: CPT | Performed by: OBSTETRICS & GYNECOLOGY

## 2022-12-08 RX ORDER — PALIPERIDONE PALMITATE 156 MG/ML
INJECTION INTRAMUSCULAR
COMMUNITY
Start: 2022-12-06

## 2022-12-08 RX ORDER — MEDROXYPROGESTERONE ACETATE 150 MG/ML
150 INJECTION, SUSPENSION INTRAMUSCULAR
Status: COMPLETED | OUTPATIENT
Start: 2022-12-08 | End: 2023-08-10

## 2022-12-08 RX ORDER — DIVALPROEX SODIUM 250 MG/1
TABLET, EXTENDED RELEASE ORAL
COMMUNITY
Start: 2022-12-06

## 2022-12-08 RX ORDER — AZITHROMYCIN 500 MG/1
1000 TABLET, FILM COATED ORAL DAILY
Qty: 2 TABLET | Refills: 0 | Status: SHIPPED | OUTPATIENT
Start: 2022-12-08 | End: 2022-12-09

## 2022-12-08 RX ADMIN — MEDROXYPROGESTERONE ACETATE 150 MG: 150 INJECTION, SUSPENSION INTRAMUSCULAR at 11:59

## 2022-12-08 ASSESSMENT — PATIENT HEALTH QUESTIONNAIRE - PHQ9: SUM OF ALL RESPONSES TO PHQ QUESTIONS 1-9: 3

## 2022-12-08 NOTE — PROGRESS NOTES
[facility]- OBGYN    CC: birth control, incomplete treatment for chlamydia     S:Bibiana Andino is a 22 year old  who presents today for birth control follow up.  Patient reports she is still having issues remembering to take her birth control pill.  She is hoping to start depo provera today instead.  She also states she forgot to take the full week of doxycycline to treat her positive chlamydia.  She is wondering if there is an alternative treatment.     O: Patient Vitals for the past 24 hrs:   BP Pulse SpO2 Weight   22 1057 137/52 80 100 % 90.8 kg (200 lb 3.2 oz)   ]  Exam:  General- awake, alert, answering questions appropriately, appears comfortable  CV- regular rate  Lung- breathing comfortably on room air    Imaging and Labs:  Results for orders placed or performed in visit on 22   HCG Qual, Urine ([id number])     Status: Normal   Result Value Ref Range    hCG Urine Qualitative Negative Negative         A&P: Bibiana Andino is a 22 year old  who presents today for birth control follow up.    (Z30.09) Birth control counseling  (primary encounter diagnosis)  Comment: Discussed options including depo provera and Nexplanon.  She would like to start depo today.  Reviewed expected bleeding profile and recommendation for dose q3 months.  Patient got injection after pregnancy test confirmed negative  Plan: HCG Qual, Urine ([id number]), medroxyPROGESTERone         (DEPO-PROVERA) injection 150 mg    (A74.9) Chlamydia  Comment: Patient was unable to remember to complete 7 day course of doxycycline.  She does not think she will be able to complete a week of medications.  Alternative therapy prescribed.  Patient reminded to discuss positive testing with her sex partners and encourage them to get tested and treated as well.   Plan: azithromycin (ZITHROMAX) 500 MG tablet    Kasia Cuevas MD

## 2023-02-28 ENCOUNTER — ALLIED HEALTH/NURSE VISIT (OUTPATIENT)
Dept: NURSING | Facility: CLINIC | Age: 23
End: 2023-02-28
Payer: COMMERCIAL

## 2023-02-28 DIAGNOSIS — Z30.9 ENCOUNTER FOR BIRTH CONTROL: Primary | ICD-10-CM

## 2023-02-28 PROCEDURE — 96372 THER/PROPH/DIAG INJ SC/IM: CPT | Performed by: OBSTETRICS & GYNECOLOGY

## 2023-02-28 RX ADMIN — MEDROXYPROGESTERONE ACETATE 150 MG: 150 INJECTION, SUSPENSION INTRAMUSCULAR at 10:50

## 2023-05-16 ENCOUNTER — ALLIED HEALTH/NURSE VISIT (OUTPATIENT)
Dept: NURSING | Facility: CLINIC | Age: 23
End: 2023-05-16
Payer: COMMERCIAL

## 2023-05-16 DIAGNOSIS — Z30.9 ENCOUNTER FOR BIRTH CONTROL: Primary | ICD-10-CM

## 2023-05-16 PROCEDURE — 96372 THER/PROPH/DIAG INJ SC/IM: CPT | Performed by: OBSTETRICS & GYNECOLOGY

## 2023-05-16 RX ADMIN — MEDROXYPROGESTERONE ACETATE 150 MG: 150 INJECTION, SUSPENSION INTRAMUSCULAR at 09:24

## 2023-05-26 ENCOUNTER — TELEPHONE (OUTPATIENT)
Dept: FAMILY MEDICINE | Age: 23
End: 2023-05-26

## 2023-07-06 NOTE — PROGRESS NOTES
As at 0600 ~Pt appeared sleeping  for  7 hours this shift    Respirations are even and unlabored.    Safety checks completed throughout the night per protocol.     No discomfort or pain verbalized this shift    No harm/assault  to self and others noted or reported this shift.     No SI/HI or hallucination noted or reported this shift    Will continue to monitor and assist as needed.         Awake, alert, confused.

## 2023-08-10 ENCOUNTER — ALLIED HEALTH/NURSE VISIT (OUTPATIENT)
Dept: NURSING | Facility: CLINIC | Age: 23
End: 2023-08-10
Payer: COMMERCIAL

## 2023-08-10 VITALS
HEART RATE: 79 BPM | DIASTOLIC BLOOD PRESSURE: 78 MMHG | SYSTOLIC BLOOD PRESSURE: 133 MMHG | HEIGHT: 71 IN | BODY MASS INDEX: 30.42 KG/M2 | WEIGHT: 217.3 LBS

## 2023-08-10 DIAGNOSIS — Z30.42 DEPO-PROVERA CONTRACEPTIVE STATUS: Primary | ICD-10-CM

## 2023-08-10 PROCEDURE — 99207 PR NO CHARGE NURSE ONLY: CPT

## 2023-08-10 PROCEDURE — 96372 THER/PROPH/DIAG INJ SC/IM: CPT | Performed by: OBSTETRICS & GYNECOLOGY

## 2023-08-10 RX ADMIN — MEDROXYPROGESTERONE ACETATE 150 MG: 150 INJECTION, SUSPENSION INTRAMUSCULAR at 12:00

## 2023-08-10 NOTE — NURSING NOTE
Clinic Administered Medication Documentation      Depo Provera Documentation    Depo-Provera Standing Order inclusion/exclusion criteria reviewed.     Is this the initial or subsequent dose of Depo Provera? Subsequent dose - patient is within the acceptable window of time (11-15 weeks) for subsequent injection. Pregnancy test not indicated.    Patient meets: inclusion criteria     Is there an active order (written within the past 365 days, with administrations remaining, not ) in the chart? Yes.     Prior to injection, verified patient identity using patient's name and date of birth. Medication was administered. Please see MAR and medication order for additional information.     Vial/Syringe: Single dose vial. Was entire vial of medication used? Yes    Patient instructed to remain in clinic for 15 minutes and report any adverse reaction to staff immediately.  NEXT INJECTION DUE: 10/26/23 - 23

## 2023-11-16 ENCOUNTER — OFFICE VISIT (OUTPATIENT)
Dept: OBGYN | Facility: CLINIC | Age: 23
End: 2023-11-16
Payer: MEDICARE

## 2023-11-16 VITALS
HEART RATE: 60 BPM | DIASTOLIC BLOOD PRESSURE: 50 MMHG | HEIGHT: 71 IN | BODY MASS INDEX: 30.38 KG/M2 | SYSTOLIC BLOOD PRESSURE: 115 MMHG | OXYGEN SATURATION: 99 % | WEIGHT: 217 LBS

## 2023-11-16 DIAGNOSIS — Z11.3 SCREEN FOR STD (SEXUALLY TRANSMITTED DISEASE): ICD-10-CM

## 2023-11-16 DIAGNOSIS — Z01.419 WELL WOMAN EXAM: Primary | ICD-10-CM

## 2023-11-16 DIAGNOSIS — Z23 NEED FOR HPV VACCINATION: ICD-10-CM

## 2023-11-16 DIAGNOSIS — Z30.42 DEPO-PROVERA CONTRACEPTIVE STATUS: ICD-10-CM

## 2023-11-16 DIAGNOSIS — Z23 NEED FOR PROPHYLACTIC VACCINATION AND INOCULATION AGAINST INFLUENZA: ICD-10-CM

## 2023-11-16 PROCEDURE — 90471 IMMUNIZATION ADMIN: CPT | Performed by: OBSTETRICS & GYNECOLOGY

## 2023-11-16 PROCEDURE — 87491 CHLMYD TRACH DNA AMP PROBE: CPT | Performed by: OBSTETRICS & GYNECOLOGY

## 2023-11-16 PROCEDURE — 87591 N.GONORRHOEAE DNA AMP PROB: CPT | Performed by: OBSTETRICS & GYNECOLOGY

## 2023-11-16 PROCEDURE — 96372 THER/PROPH/DIAG INJ SC/IM: CPT | Performed by: OBSTETRICS & GYNECOLOGY

## 2023-11-16 PROCEDURE — 90651 9VHPV VACCINE 2/3 DOSE IM: CPT | Performed by: OBSTETRICS & GYNECOLOGY

## 2023-11-16 PROCEDURE — 90686 IIV4 VACC NO PRSV 0.5 ML IM: CPT | Performed by: OBSTETRICS & GYNECOLOGY

## 2023-11-16 PROCEDURE — G0008 ADMIN INFLUENZA VIRUS VAC: HCPCS | Mod: 59 | Performed by: OBSTETRICS & GYNECOLOGY

## 2023-11-16 PROCEDURE — 99395 PREV VISIT EST AGE 18-39: CPT | Mod: 25 | Performed by: OBSTETRICS & GYNECOLOGY

## 2023-11-16 RX ORDER — MEDROXYPROGESTERONE ACETATE 150 MG/ML
150 INJECTION, SUSPENSION INTRAMUSCULAR
Status: DISCONTINUED | OUTPATIENT
Start: 2023-11-16 | End: 2024-07-16

## 2023-11-16 RX ADMIN — MEDROXYPROGESTERONE ACETATE 150 MG: 150 INJECTION, SUSPENSION INTRAMUSCULAR at 11:43

## 2023-11-16 NOTE — PROGRESS NOTES
"Bibiana is a 23 year old  female who presents for annual exam.     No menses on depo provera.  No LMP recorded. Patient has had an injection.. Using depo or other injectable for contraception.  She is not currently considering pregnancy.  Besides routine health maintenance, she has no other health concerns today .  GYNECOLOGIC HISTORY:  Patient reports she is not sexually active  History sexually transmitted infections:Chlamydia  STI testing offered?  Accepted  PENNIE exposure: No  History of abnormal Pap smear: No.  Last pap 22 NILM    I have reviewed this patient's family history and updated it with pertinent information if needed.  Family History   Problem Relation Age of Onset    Cancer Maternal Grandfather    Patient states she is not sure what type of cancer this was    HEALTH MAINTENANCE:  Cholesterol: (No results found for: \"CHOL\" History of abnormal lipids: No  Mammo: n/a. History of abnormal Mammo: reports prior assessment of right breast cyst.  Remains stable per patient  Regular Self Breast Exams: Yes  TSH: (  TSH   Date Value Ref Range Status   11/10/2022 0.74 0.40 - 4.00 mU/L Final    )    HISTORY:  OB History    Para Term  AB Living   0 0 0 0 0 0   SAB IAB Ectopic Multiple Live Births   0 0 0 0 0     Past Medical History:   Diagnosis Date    PTSD (post-traumatic stress disorder)      No past surgical history on file.  Family History   Problem Relation Age of Onset    Cancer Maternal Grandfather      Social History     Denies any tobacco use.  Consumes 6-7 drinks per week.  THC use daily  Current Outpatient Medications:     divalproex sodium extended-release (DEPAKOTE ER) 250 MG 24 hr tablet, , Disp: , Rfl:     divalproex sodium extended-release (DEPAKOTE ER) 500 MG 24 hr tablet, Take 2 tablets (1,000 mg) by mouth At Bedtime, Disp: 60 tablet, Rfl: 1    ibuprofen (ADVIL/MOTRIN) 600 MG tablet, Take 1 tablet (600 mg) by mouth every 6 hours as needed for moderate pain (Patient not " "taking: Reported on 11/10/2022), Disp: 40 tablet, Rfl: 0    INVEGA SUSTENNA 156 MG/ML CASTRO injection, , Disp: , Rfl:     lithium ER (LITHOBID) 300 MG CR tablet, Take 600 mg by mouth (Patient not taking: Reported on 11/10/2022), Disp: , Rfl:     Melatonin 10 MG TABS tablet, Take 10 mg by mouth, Disp: , Rfl:     Current Facility-Administered Medications:     medroxyPROGESTERone (DEPO-PROVERA) injection 150 mg, 150 mg, Intramuscular, Q90 Days, Kasia Cuevas MD, 150 mg at 11/16/23 1143   No Known Allergies    Past medical, surgical, social and family history were reviewed and updated in EPIC.    ROS:   Denies any vaginal discharge, vaginal irritation or itching, dysuria, nausea, vomiting, diarrhea, or constipation    EXAM:  /50   Pulse 60   Ht 1.803 m (5' 11\")   Wt 98.4 kg (217 lb)   SpO2 99%   BMI 30.27 kg/m     BMI: Body mass index is 30.27 kg/m .  Constitutional: healthy, alert and no distress  Head: Normocephalic. No masses, lesions, tenderness or abnormalities  Neck: Neck supple. Trachea midline. No adenopathy. Thyroid symmetric, normal size.   Cardiovascular: regular rate and rhythm  Respiratory: clear to auscultation bilaterally   Breasts: left breast normal without suspicious masses, skin changes or axillary nodes.  Right breast with 2cm by 2cm soft, non-tender, mobile mass inferior to nipple at 6 oclock.    Gastrointestinal: Abdomen soft, non-tender, non-distended.   Musculoskeletal: extremities normal  Skin: no suspicious lesions or rashes  Psychiatric: Affect appropriate, cooperative,mentation appears normal.         ASSESSMENT:  23 year old female with satisfactory annual exam   (Z01.419) Well woman exam  (primary encounter diagnosis)  COUNSELING:   Reviewed preventive health counseling, as reflected in patient instructions       Immunizations  Vaccinated for: Influenza  and HPV         Alcohol Use       Contraception  Self breast awareness taught. Patient reports area in right breast " unchanged. Explained if change in right breast cystic area (increase in size, pain, becoming firm or irregular) then we would plan imaging with mammogram and ultrasound.   reports that she has never smoked. She has never used smokeless tobacco.    Body mass index is 30.27 kg/m .    (Z11.3) Screen for STD (sexually transmitted disease)  Comment: routine screen  Plan: NEISSERIA GONORRHOEA PCR, CHLAMYDIA TRACHOMATIS        PCR    (Z23) Need for HPV vaccination  Comment: Discussed HPV vaccine to reduce risk of cervical cancer.  Patient accepts.  Explained 3 dose series  Plan: medroxyPROGESTERone (DEPO-PROVERA) injection         150 mg, VACCINE ADMINISTRATION MNVFC, INITIAL    (Z30.42) Depo-Provera contraceptive status  Comment: Patient happy with current birth control option  Plan: medroxyPROGESTERone (DEPO-PROVERA) injection         150 mg, VACCINE ADMINISTRATION MNVFC, INITIAL    (Z23) Need for prophylactic vaccination and inoculation against influenza  Comment: due   Plan: given    Kasia Cuevas MD

## 2023-11-16 NOTE — NURSING NOTE

## 2023-11-17 LAB
C TRACH DNA SPEC QL NAA+PROBE: NEGATIVE
N GONORRHOEA DNA SPEC QL NAA+PROBE: NEGATIVE

## 2024-02-08 ENCOUNTER — ALLIED HEALTH/NURSE VISIT (OUTPATIENT)
Dept: FAMILY MEDICINE | Facility: CLINIC | Age: 24
End: 2024-02-08
Payer: MEDICARE

## 2024-02-08 DIAGNOSIS — Z23 ENCOUNTER FOR IMMUNIZATION: Primary | ICD-10-CM

## 2024-02-08 DIAGNOSIS — Z23 NEED FOR PROPHYLACTIC VACCINATION AGAINST HEPATITIS B VIRUS: ICD-10-CM

## 2024-02-08 PROCEDURE — 99207 PR NO CHARGE NURSE ONLY: CPT

## 2024-02-08 PROCEDURE — 96372 THER/PROPH/DIAG INJ SC/IM: CPT | Performed by: OBSTETRICS & GYNECOLOGY

## 2024-02-08 PROCEDURE — 90471 IMMUNIZATION ADMIN: CPT

## 2024-02-08 PROCEDURE — 90651 9VHPV VACCINE 2/3 DOSE IM: CPT

## 2024-02-08 RX ADMIN — MEDROXYPROGESTERONE ACETATE 150 MG: 150 INJECTION, SUSPENSION INTRAMUSCULAR at 11:14

## 2024-02-08 NOTE — PROGRESS NOTES
Clinic Administered Medication Documentation      Depo Provera Documentation    Depo-Provera Standing Order inclusion/exclusion criteria reviewed.     Is this the initial or subsequent dose of Depo Provera? Subsequent dose - patient is within the acceptable window of time (11-15 weeks) for subsequent injection. Pregnancy test not indicated.    Patient meets: inclusion criteria     Is there an active order (written within the past 365 days, with administrations remaining, not ) in the chart? Yes.     Prior to injection, verified patient identity using patient's name and date of birth. Medication was administered. Please see MAR and medication order for additional information.     Vial/Syringe: Single dose vial. Was entire vial of medication used? Yes    Patient instructed to remain in clinic for 15 minutes and report any adverse reaction to staff immediately.  NEXT INJECTION DUE: 24 - 24    Verified that the patient has refills remaining in their prescription.            Prior to immunization administration, verified patients identity using patient s name and date of birth. Please see Immunization Activity for additional information.     Screening Questionnaire for Adult Immunization    Are you sick today?   No   Do you have allergies to medications, food, a vaccine component or latex?   No   Have you ever had a serious reaction after receiving a vaccination?   No   Do you have a long-term health problem with heart, lung, kidney, or metabolic disease (e.g., diabetes), asthma, a blood disorder, no spleen, complement component deficiency, a cochlear implant, or a spinal fluid leak?  Are you on long-term aspirin therapy?   No   Do you have cancer, leukemia, HIV/AIDS, or any other immune system problem?   No   Do you have a parent, brother, or sister with an immune system problem?   No   In the past 3 months, have you taken medications that affect  your immune system, such as prednisone, other steroids,  or anticancer drugs; drugs for the treatment of rheumatoid arthritis, Crohn s disease, or psoriasis; or have you had radiation treatments?   No   Have you had a seizure, or a brain or other nervous system problem?   No   During the past year, have you received a transfusion of blood or blood    products, or been given immune (gamma) globulin or antiviral drug?   No   For women: Are you pregnant or is there a chance you could become       pregnant during the next month?   No   Have you received any vaccinations in the past 4 weeks?   No     Immunization questionnaire answers were all negative.    I have reviewed the following standing orders:   This patient is due and qualifies for the HPV vaccine.    Click here for HPV (Adult 15-45Y) Standing Order     I have reviewed the vaccines inclusion and exclusion criteria;No concerns regarding eligibility.       Patient instructed to remain in clinic for 15 minutes afterwards, and to report any adverse reactions.     Screening performed by Purnima Barrow RN on 2/8/2024 at 11:07 AM.

## 2024-03-26 ENCOUNTER — OFFICE VISIT (OUTPATIENT)
Dept: URGENT CARE | Facility: URGENT CARE | Age: 24
End: 2024-03-26
Payer: MEDICARE

## 2024-03-26 ENCOUNTER — ANCILLARY PROCEDURE (OUTPATIENT)
Dept: GENERAL RADIOLOGY | Facility: CLINIC | Age: 24
End: 2024-03-26
Payer: MEDICARE

## 2024-03-26 VITALS
SYSTOLIC BLOOD PRESSURE: 138 MMHG | TEMPERATURE: 98 F | HEART RATE: 97 BPM | BODY MASS INDEX: 23.8 KG/M2 | DIASTOLIC BLOOD PRESSURE: 73 MMHG | RESPIRATION RATE: 17 BRPM | WEIGHT: 170 LBS | OXYGEN SATURATION: 100 % | HEIGHT: 71 IN

## 2024-03-26 DIAGNOSIS — S99.912A ANKLE INJURY, LEFT, INITIAL ENCOUNTER: ICD-10-CM

## 2024-03-26 DIAGNOSIS — S99.912A ANKLE INJURY, LEFT, INITIAL ENCOUNTER: Primary | ICD-10-CM

## 2024-03-26 PROCEDURE — 99213 OFFICE O/P EST LOW 20 MIN: CPT

## 2024-03-26 PROCEDURE — 73610 X-RAY EXAM OF ANKLE: CPT | Mod: TC | Performed by: STUDENT IN AN ORGANIZED HEALTH CARE EDUCATION/TRAINING PROGRAM

## 2024-03-26 PROCEDURE — 73630 X-RAY EXAM OF FOOT: CPT | Mod: TC | Performed by: STUDENT IN AN ORGANIZED HEALTH CARE EDUCATION/TRAINING PROGRAM

## 2024-03-26 ASSESSMENT — PAIN SCALES - GENERAL: PAINLEVEL: SEVERE PAIN (6)

## 2024-03-26 ASSESSMENT — ENCOUNTER SYMPTOMS: JOINT SWELLING: 1

## 2024-03-26 NOTE — PROGRESS NOTES
"Patient presents with:  Ankle Pain: Slipped on ice today and hurt left ankle, pain and swelling   Urgent Care      Clinical Decision Making:  Slipped and twisted ankle when running to catch train this morning.  Exam remarkable for increased pain with eversion and dorsiflexion of left foot/ankle.  Point bony tenderness, swelling, and slight ecchymosis over lateral malleolus and navicular region - swelling specifically localized over navicular region.    Per [place] ankle rules- ankle x ray recommended due to pain in malleolar zone and point bony tenderness at tip of lateral malleolus. Foot film also recommended due to point bony tenderness at navicular. She does not have point bony tenderness at base of 5th metatarsal. She is able to bear weight, although with difficulty due to pain.    I independently visualized the left ankle xray: concern for small avulsion fracture of navicular - final read \"There is an age-indeterminate avulsion fracture at the dorsal aspect of the navicular. Recommend correlation with point tenderness.\"  Per final read left foot x ray: \"There is a linear ossific fragment at the dorsal aspect of the talus adjacent soft tissue swelling, which likely represents an avulsion fracture fragment (age indeterminate). Possible additional small ossific fragment at the lateral aspect of the calcaneus near the calcaneocuboid joint, which could represent additional avulsion fracture.\"  Pt placed in short walking boot. Recommended crutches to be nonweightbearing- pt declined.  Discussed ways she can get crutches, if she changes her mind.  Referred to sports med/ortho for further evaluation and management. Discussed supportive care. Pt agreeable.     At the end of the encounter, I discussed results, diagnosis, medications. Discussed red flags for immediate return to clinic/ER, as well as indications for follow up if no improvement. Patient understood and agreed to plan. Patient was stable for discharge.    " ICD-10-CM    1. Ankle injury, left, initial encounter  S99.912A XR Ankle Left G/E 3 Views     XR Foot Left G/E 3 Views     Orthopedic  Referral     Ankle/Foot Bracing Supplies Order Walking Boot; Left; Pneumatic; Short          Patient Instructions   Please follow up with orthopedics regarding your left ankle fracture. I placed urgent referral.  They should call you to set up an appointment.  Please call at the number listed if you do not hear from them.    Rest: Stay off the affected foot  Ice: I recommend using ice bucket as this is significantly better at reducing inflammation and pain then an ice pack.  Ice several times throughout the day. Perform 15 minutes on, 15 minutes off 4 or more times per day as needed.  Compression: Make sure you have boot on any time you are walking. Follow ortho/sports med recommendation on boot after you see them.  Elevation: Rest and elevate the affected ankle, may elevate on pillow, try to elevate above heart (lay flat with injury propped higher than body)    Ibuprofen and tylenol as needed for discomfort.    Ibuprofen 600 mg up to 4 times daily with food.  Tylenol 1000 mg every 8 hours as needed.    If your left foot becomes cold, numb, tingling, severely painful, go to the emergency department.     HPI:  Bibiana Andino is a 23 year old female who presents today with left ankle pain. Reports tripped and twisted ankle while trying to catch train this morning. Did not hit ground, no head strike. Able to ambulate but feels need to limp due to left ankle pain. Noticed left ankle swelling shortly after injury.    Denies prior injury/surgery to left ankle.    History obtained from the patient.    Problem List:  2022-11: Bipolar I disorder, most recent episode (or current) manic,   moderate (H)  2021-11: Bipolar I disorder, current or most recent episode manic,   with psychotic features (H)  2021-11: Vaginal pain  2021-11: PTSD (post-traumatic stress disorder)  2021-11: Psychosis  "(H)  2021-10: COVID-19  2021-09: Bipolar affective disorder in remission (H24)  2021-09: Severe episode of recurrent major depressive disorder, with   psychotic features (H)  2021-09: Suicidal ideation      Past Medical History:   Diagnosis Date    PTSD (post-traumatic stress disorder)        Social History     Tobacco Use    Smoking status: Never    Smokeless tobacco: Never   Substance Use Topics    Alcohol use: Yes     Comment: occ       Review of Systems   Musculoskeletal:  Positive for joint swelling.        Left ankle pain and swelling.   All other systems reviewed and are negative.      Vitals:    03/26/24 1448   BP: 138/73   Pulse: 97   Resp: 17   Temp: 98  F (36.7  C)   TempSrc: Temporal   SpO2: 100%   Weight: 77.1 kg (170 lb)   Height: 1.803 m (5' 11\")       Physical Exam  Constitutional:       General: She is not in acute distress.     Appearance: Normal appearance. She is not ill-appearing, toxic-appearing or diaphoretic.   HENT:      Head: Normocephalic and atraumatic.      Right Ear: External ear normal.      Left Ear: External ear normal.      Nose: Nose normal.      Mouth/Throat:      Mouth: Mucous membranes are moist.      Pharynx: No oropharyngeal exudate or posterior oropharyngeal erythema.   Eyes:      General: No scleral icterus.        Right eye: No discharge.         Left eye: No discharge.      Conjunctiva/sclera: Conjunctivae normal.   Cardiovascular:      Rate and Rhythm: Normal rate and regular rhythm.      Heart sounds: Normal heart sounds. No murmur heard.  Pulmonary:      Effort: Pulmonary effort is normal. No respiratory distress.      Breath sounds: Normal breath sounds.   Musculoskeletal:         General: Swelling, tenderness and signs of injury present. No deformity.      Right lower leg: No edema.      Left lower leg: No edema.      Left ankle: Swelling and ecchymosis present. No deformity or lacerations. Tenderness present over the lateral malleolus. No medial malleolus, ATF " ligament, AITF ligament, CF ligament, posterior TF ligament, base of 5th metatarsal or proximal fibula tenderness. Decreased range of motion. Anterior drawer test negative. Normal pulse.      Left Achilles Tendon: Normal. No tenderness or defects. Jones's test negative.      Left foot: Decreased range of motion. Normal capillary refill. Swelling, tenderness and bony tenderness present. No deformity, bunion, Charcot foot, foot drop, prominent metatarsal heads, laceration or crepitus. Normal pulse.      Comments: Increased pain with eversion and dorsi flexion.  Tenderness and swelling over lateral malleolus and navicular region. Decreased ROM 2nd to pain.   Neurological:      General: No focal deficit present.      Mental Status: She is alert and oriented to person, place, and time.   Psychiatric:         Attention and Perception: Attention normal. She is attentive. She does not perceive auditory or visual hallucinations.         Mood and Affect: Mood and affect normal. Mood is not anxious, depressed or elated. Affect is not labile, blunt, flat, angry, tearful or inappropriate.         Speech: Speech normal. She is communicative. Speech is not rapid and pressured, delayed, slurred or tangential.         Behavior: Behavior normal. Behavior is not agitated, slowed, aggressive, withdrawn, hyperactive or combative. Behavior is cooperative.         Thought Content: Thought content normal. Thought content is not paranoid or delusional. Thought content does not include homicidal or suicidal ideation.         Cognition and Memory: Cognition normal.         Judgment: Judgment normal. Judgment is not impulsive or inappropriate.         Results:  Results for orders placed or performed in visit on 03/26/24   XR Foot Left G/E 3 Views     Status: None    Narrative    XR FOOT LEFT G/E 3 VIEWS 3/26/2024 3:44 PM     HISTORY: Left ankle pain after slipped and twisted. Point tenderness  in navicular region.; Ankle injury, left,  initial encounter    COMPARISON: None.       Impression    IMPRESSION:    There is a linear ossific fragment at the dorsal aspect of the talus  adjacent soft tissue swelling, which likely represents an avulsion  fracture fragment (age indeterminate). Possible additional small  ossific fragment at the lateral aspect of the calcaneus near the  calcaneocuboid joint, which could represent additional avulsion  fracture. Recommend clinical correlation.     PERFECTO FERRO MD         SYSTEM ID:  [id number]   Results for orders placed or performed in visit on 03/26/24   XR Ankle Left G/E 3 Views     Status: None    Narrative    XR ANKLE LEFT G/E 3 VIEWS 3/26/2024 3:16 PM     HISTORY: Injury.    COMPARISON: None.       Impression    IMPRESSION:    There is an age-indeterminate avulsion fracture at the dorsal aspect  of the navicular. Recommend correlation with point tenderness.    PERFECTO FERRO MD         SYSTEM ID:  [id number]

## 2024-03-26 NOTE — PATIENT INSTRUCTIONS
Please follow up with orthopedics regarding your left ankle fracture. I placed urgent referral.  They should call you to set up an appointment.  Please call at the number listed if you do not hear from them.    Rest: Stay off the affected foot  Ice: I recommend using ice bucket as this is significantly better at reducing inflammation and pain then an ice pack.  Ice several times throughout the day. Perform 15 minutes on, 15 minutes off 4 or more times per day as needed.  Compression: Make sure you have boot on any time you are walking. Follow ortho/sports med recommendation on boot after you see them.  Elevation: Rest and elevate the affected ankle, may elevate on pillow, try to elevate above heart (lay flat with injury propped higher than body)    Ibuprofen and tylenol as needed for discomfort.    Ibuprofen 600 mg up to 4 times daily with food.  Tylenol 1000 mg every 8 hours as needed.    If your left foot becomes cold, numb, tingling, severely painful, go to the emergency department.

## 2024-03-27 NOTE — PROGRESS NOTES
ASSESSMENT & PLAN    Bibiana was seen today for pain and pain.    Diagnoses and all orders for this visit:    Ankle injury, left, initial encounter  -     Orthopedic  Referral  -     CT Foot Left w/o Contrast; Future  -     CT Ankle Left w/o Contrast; Future        23 year old female 3 days out from ankle injury. Inverted ankle in snow and pot hole. Found to have avulsion fractures of navicular, dorsal talus and calcaneous. Tenderness and pain at site of talar and calcaneous avulsion as well as ATFL and syndesmosis. Given short ankle boot for immobilization but able to walk in boot without pain and able to bear weight without boot in minimal pain. Clinically well appearing at this time with minimal swelling and bruising and ambulating.   Will treat as ankle sprain and avulsion injury at this time with boot for immobilization for two weeks. Will get CT scan to further evaluate nature of bony injury with avulsion fracture. Mortise intact. Does not appear like unstable ankle needing emergent surgical evaluation.  PLAN:   - Tylenol 500-1000mg (up to three times per day) and ibuprofen 600mg (three-four times per day) as needed for pain and swelling. Always take ibuprofen with food.   -CT scan to confirm avulsion fractures   -Boot until seen for follow-up after CT scan  -Weightbearing as tolerated.     Tejalsa Escobar [facility]    -----  Chief Complaint   Patient presents with    Left Ankle - Pain    Left Foot - Pain       SUBJECTIVE  Bibiana Thu is a/an 23 year old female who is seen in consultation at the request of  Ellie Reveles C.N.P. for evaluation of left foot and ankle pain.     The patient is seen by themselves.    Onset: 3 day(s) ago. Patient describes injury as Slipped and twisted ankle when running to catch train   Location of Pain: left foot and ankle, near medial and lateral malleolus  Worsened by: eversion, dorsiflexion, touch,  weightbearing.  Better with: boot, ice, ibuprofen  Treatments tried: boot, ice, ibuprofen  Associated symptoms: Pain was at a 6/10, currently a 3/4 our of 10.   Denies numbness, tingling, locking  Orthopedic/Surgical history: NO prior ankle history, endorses former RIGHT ankle sprains 2 and 3 years ago.   Social History/Occupation: unemployed, enjoys performing, acting, singing, and cooking.     Patient Active Problem List   Diagnosis    Psychosis (H)    Bipolar I disorder, current or most recent episode manic, with psychotic features (H)    Vaginal pain    PTSD (post-traumatic stress disorder)    Bipolar affective disorder in remission (H24)    Bipolar I disorder, most recent episode (or current) manic, moderate (H)    COVID-19    Severe episode of recurrent major depressive disorder, with psychotic features (H)    Suicidal ideation       Current Outpatient Medications   Medication Sig Dispense Refill    divalproex sodium extended-release (DEPAKOTE ER) 250 MG 24 hr tablet       divalproex sodium extended-release (DEPAKOTE ER) 500 MG 24 hr tablet Take 2 tablets (1,000 mg) by mouth At Bedtime (Patient not taking: Reported on 3/26/2024) 60 tablet 1    ibuprofen (ADVIL/MOTRIN) 600 MG tablet Take 1 tablet (600 mg) by mouth every 6 hours as needed for moderate pain 40 tablet 0    INVEGA SUSTENNA 156 MG/ML CASTRO injection       lithium ER (LITHOBID) 300 MG CR tablet Take 600 mg by mouth (Patient not taking: Reported on 11/10/2022)      Melatonin 10 MG TABS tablet Take 10 mg by mouth         PMH, Medications and Allergies were reviewed and updated as needed.    REVIEW OF SYSTEMS:  10 point ROS is negative other than symptoms noted above in HPI        OBJECTIVE:  There were no vitals taken for this visit.   General: healthy, alert and in no distress  Skin: no suspicious lesions or rash.  CV: distal perfusion intact left foot  Resp: normal respiratory effort without conversational dyspnea   Psych: normal mood and  affect  Gait: ambulating antalgic in boot  Neuro: Normal light sensory exam of left lower extremity     LEFT ANKLE  Inspection:    No swelling, redness, edema or ecchymosis is observed  Palpation:    Tender about the ATFL, CFL, PTFL, syndesmosis, medial malleolus, and calcaneous and talus. No navicular pain Remainder of bony and ligamentous landmarks are nontender.  Range of Motion:     Plantarflexion limited slightly by pain / dorsiflexion limited slightly by pain / inversion limited slightly by pain / eversion limited slightly by pain  Strength:    limited substantially by pain  Special Tests:    negative anterior drawer, negative talar tilt, negative valgus stress, negative Jones sign, negative squeeze test. Unable to perform heel raise      RADIOLOGY:  Final results and radiologist's interpretation, available in the [facility] health record.  Images were reviewed with the patient in the office today.    Reviewed and agree with interpretation as below:  Narrative & Impression   XR FOOT LEFT G/E 3 VIEWS 3/26/2024 3:44 PM      HISTORY: Left ankle pain after slipped and twisted. Point tenderness  in navicular region.; Ankle injury, left, initial encounter     COMPARISON: None.                                                                       IMPRESSION:     There is a linear ossific fragment at the dorsal aspect of the talus  adjacent soft tissue swelling, which likely represents an avulsion  fracture fragment (age indeterminate). Possible additional small  ossific fragment at the lateral aspect of the calcaneus near the  calcaneocuboid joint, which could represent additional avulsion  fracture. Recommend clinical correlation.      PERFECTO FERRO MD              Narrative & Impression   XR ANKLE LEFT G/E 3 VIEWS 3/26/2024 3:16 PM      HISTORY: Injury.     COMPARISON: None.                                                                       IMPRESSION:     There is an age-indeterminate avulsion fracture at the  dorsal aspect  of the navicular. Recommend correlation with point tenderness.     PERFECTO FERRO MD         SYSTEM ID:  [id number]                  Disclaimer: This note consists of symbols derived from keyboarding, dictation and/or voice recognition software. As a result, there may be errors in the script that have gone undetected. Please consider this when interpreting information found in this chart.

## 2024-03-29 ENCOUNTER — OFFICE VISIT (OUTPATIENT)
Dept: ORTHOPEDICS | Facility: CLINIC | Age: 24
End: 2024-03-29
Payer: MEDICARE

## 2024-03-29 VITALS — SYSTOLIC BLOOD PRESSURE: 124 MMHG | DIASTOLIC BLOOD PRESSURE: 70 MMHG

## 2024-03-29 DIAGNOSIS — S99.912A ANKLE INJURY, LEFT, INITIAL ENCOUNTER: ICD-10-CM

## 2024-03-29 PROCEDURE — 99213 OFFICE O/P EST LOW 20 MIN: CPT | Performed by: STUDENT IN AN ORGANIZED HEALTH CARE EDUCATION/TRAINING PROGRAM

## 2024-03-29 NOTE — LETTER
3/29/2024         RE: Bibiana Andino  [address]  [address]  [place] [address]        Dear Colleague,    Thank you for referring your patient, Bibiana Andino, to the [facility]. Please see a copy of my visit note below.    ASSESSMENT & PLAN    Bibiana was seen today for pain and pain.    Diagnoses and all orders for this visit:    Ankle injury, left, initial encounter  -     Orthopedic  Referral  -     CT Foot Left w/o Contrast; Future  -     CT Ankle Left w/o Contrast; Future        23 year old female 3 days out from ankle injury. Inverted ankle in snow and pot hole. Found to have avulsion fractures of navicular, dorsal talus and calcaneous. Tenderness and pain at site of talar and calcaneous avulsion as well as ATFL and syndesmosis. Given short ankle boot for immobilization but able to walk in boot without pain and able to bear weight without boot in minimal pain. Clinically well appearing at this time with minimal swelling and bruising and ambulating.   Will treat as ankle sprain and avulsion injury at this time with boot for immobilization for two weeks. Will get CT scan to further evaluate nature of bony injury with avulsion fracture. Mortise intact. Does not appear like unstable ankle needing emergent surgical evaluation.  PLAN:   - Tylenol 500-1000mg (up to three times per day) and ibuprofen 600mg (three-four times per day) as needed for pain and swelling. Always take ibuprofen with food.   -CT scan to confirm avulsion fractures   -Boot until seen for follow-up after CT scan  -Weightbearing as tolerated.     Tejal Escobar DO  [facility]    -----  Chief Complaint   Patient presents with     Left Ankle - Pain     Left Foot - Pain       SUBJECTIVE  Bibiana Andino is a/an 23 year old female who is seen in consultation at the request of  Ellie Reveles C.N.P. for evaluation of left foot and ankle pain.      The patient is seen by themselves.    Onset: 3 day(s) ago. Patient describes injury as Slipped and twisted ankle when running to catch train   Location of Pain: left foot and ankle, near medial and lateral malleolus  Worsened by: eversion, dorsiflexion, touch, weightbearing.  Better with: boot, ice, ibuprofen  Treatments tried: boot, ice, ibuprofen  Associated symptoms: Pain was at a 6/10, currently a 3/4 our of 10.   Denies numbness, tingling, locking  Orthopedic/Surgical history: NO prior ankle history, endorses former RIGHT ankle sprains 2 and 3 years ago.   Social History/Occupation: unemployed, enjoys performing, acting, singing, and cooking.     Patient Active Problem List   Diagnosis     Psychosis (H)     Bipolar I disorder, current or most recent episode manic, with psychotic features (H)     Vaginal pain     PTSD (post-traumatic stress disorder)     Bipolar affective disorder in remission (H24)     Bipolar I disorder, most recent episode (or current) manic, moderate (H)     COVID-19     Severe episode of recurrent major depressive disorder, with psychotic features (H)     Suicidal ideation       Current Outpatient Medications   Medication Sig Dispense Refill     divalproex sodium extended-release (DEPAKOTE ER) 250 MG 24 hr tablet        divalproex sodium extended-release (DEPAKOTE ER) 500 MG 24 hr tablet Take 2 tablets (1,000 mg) by mouth At Bedtime (Patient not taking: Reported on 3/26/2024) 60 tablet 1     ibuprofen (ADVIL/MOTRIN) 600 MG tablet Take 1 tablet (600 mg) by mouth every 6 hours as needed for moderate pain 40 tablet 0     INVEGA SUSTENNA 156 MG/ML CASTRO injection        lithium ER (LITHOBID) 300 MG CR tablet Take 600 mg by mouth (Patient not taking: Reported on 11/10/2022)       Melatonin 10 MG TABS tablet Take 10 mg by mouth         PMH, Medications and Allergies were reviewed and updated as needed.    REVIEW OF SYSTEMS:  10 point ROS is negative other than symptoms noted above in  HPI        OBJECTIVE:  There were no vitals taken for this visit.   General: healthy, alert and in no distress  Skin: no suspicious lesions or rash.  CV: distal perfusion intact left foot  Resp: normal respiratory effort without conversational dyspnea   Psych: normal mood and affect  Gait: ambulating antalgic in boot  Neuro: Normal light sensory exam of left lower extremity     LEFT ANKLE  Inspection:    No swelling, redness, edema or ecchymosis is observed  Palpation:    Tender about the ATFL, CFL, PTFL, syndesmosis, medial malleolus, and calcaneous and talus. No navicular pain Remainder of bony and ligamentous landmarks are nontender.  Range of Motion:     Plantarflexion limited slightly by pain / dorsiflexion limited slightly by pain / inversion limited slightly by pain / eversion limited slightly by pain  Strength:    limited substantially by pain  Special Tests:    negative anterior drawer, negative talar tilt, negative valgus stress, negative Jones sign, negative squeeze test. Unable to perform heel raise      RADIOLOGY:  Final results and radiologist's interpretation, available in the [facility] health record.  Images were reviewed with the patient in the office today.    Reviewed and agree with interpretation as below:  Narrative & Impression   XR FOOT LEFT G/E 3 VIEWS 3/26/2024 3:44 PM      HISTORY: Left ankle pain after slipped and twisted. Point tenderness  in navicular region.; Ankle injury, left, initial encounter     COMPARISON: None.                                                                       IMPRESSION:     There is a linear ossific fragment at the dorsal aspect of the talus  adjacent soft tissue swelling, which likely represents an avulsion  fracture fragment (age indeterminate). Possible additional small  ossific fragment at the lateral aspect of the calcaneus near the  calcaneocuboid joint, which could represent additional avulsion  fracture. Recommend clinical correlation.      PERFECTO BULLOCK  MD PILLO              Narrative & Impression   XR ANKLE LEFT G/E 3 VIEWS 3/26/2024 3:16 PM      HISTORY: Injury.     COMPARISON: None.                                                                       IMPRESSION:     There is an age-indeterminate avulsion fracture at the dorsal aspect  of the navicular. Recommend correlation with point tenderness.     PERFECTO FERRO MD         SYSTEM ID:  [id number]                  Disclaimer: This note consists of symbols derived from keyboarding, dictation and/or voice recognition software. As a result, there may be errors in the script that have gone undetected. Please consider this when interpreting information found in this chart.       Again, thank you for allowing me to participate in the care of your patient.        Sincerely,        Tejal Escobar, DO

## 2024-03-29 NOTE — PATIENT INSTRUCTIONS
1. Ankle injury, left, initial encounter      - Tylenol 500-1000mg (up to three times per day) and ibuprofen 600mg (three-four times per day) as needed for pain and swelling. Always take ibuprofen with food.   -CT scan to confirm avulsion fractures   -Boot until seen for follow-up after CT scan  -Weightbearing as tolerated.       Please call [phone]  Ask for my team if you have any questions or concerns    Tejal Escobar DO  [facility]      Thank you for choosing [facility]!    CLINIC LOCATIONS:     [place]  TRIAGE LINE: [phone] [address] [facility] APPOINTMENTS: [phone]   [place] [address] RADIOLOGY: [phone]   (Monday, Thursday & Friday) PHYSICAL THERAPY: [phone]    BILLING QUESTIONS: [phone]   [place] FAX: [phone] [address]    [place] [address]    (Wednesday)

## 2024-04-04 ENCOUNTER — TELEPHONE (OUTPATIENT)
Dept: OBGYN | Facility: CLINIC | Age: 24
End: 2024-04-04
Payer: MEDICARE

## 2024-04-04 NOTE — TELEPHONE ENCOUNTER
Health Call Center    Phone Message    May a detailed message be left on voicemail: yes     Reason for Call: Other: NUBIA Martel from [employer] is calling on behalf of the pt to request a prescription for an inhaler for the pt. She states pt has had exercise-induced asthma. She states Dr. Cuevas is pt's PCP. Please advise and reach back out to her with any questions/concerns. Thank you.      Action Taken: Other: eros lopez    Travel Screening: Not Applicable

## 2024-04-05 NOTE — TELEPHONE ENCOUNTER
Attempted to call Mara at [phone] to advised we do not manage asthma care.  LVM for Mara to call back.    Also called the patient.  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    Patient expressed understanding.    Stephanie Oliva, RN

## 2024-04-05 NOTE — TELEPHONE ENCOUNTER
Yes she should be seen by family practice for this.  As an OBGYN, I do not manage asthma care.  -Kasia Cuevas MD

## 2024-04-05 NOTE — TELEPHONE ENCOUNTER
Mara had called back and discussed that AH unable to prescribe and manage inhalers.  Did discuss that we already spoke with patient regarding this as well.    Stephanie Oliva RN

## 2024-04-16 ENCOUNTER — HOSPITAL ENCOUNTER (OUTPATIENT)
Dept: CT IMAGING | Facility: CLINIC | Age: 24
Discharge: HOME | End: 2024-04-16
Attending: STUDENT IN AN ORGANIZED HEALTH CARE EDUCATION/TRAINING PROGRAM
Payer: MEDICARE

## 2024-04-16 DIAGNOSIS — S99.912A ANKLE INJURY, LEFT, INITIAL ENCOUNTER: ICD-10-CM

## 2024-04-16 PROCEDURE — 73700 CT LOWER EXTREMITY W/O DYE: CPT | Mod: LT,MG

## 2024-04-16 PROCEDURE — 73700 CT LOWER EXTREMITY W/O DYE: CPT | Mod: LT,XS,MG

## 2024-04-18 ENCOUNTER — TELEPHONE (OUTPATIENT)
Dept: ORTHOPEDICS | Facility: CLINIC | Age: 24
End: 2024-04-18
Payer: MEDICARE

## 2024-04-18 DIAGNOSIS — S99.912A ANKLE INJURY, LEFT, INITIAL ENCOUNTER: Primary | ICD-10-CM

## 2024-04-18 DIAGNOSIS — S92.022D CLOSED DISPLACED FRACTURE OF ANTERIOR PROCESS OF LEFT CALCANEUS WITH ROUTINE HEALING, SUBSEQUENT ENCOUNTER: ICD-10-CM

## 2024-04-18 NOTE — TELEPHONE ENCOUNTER
Called again and reached patient after missing patient x2. Relayed CT results that confirmed multiple foot fractures. Overall feels like minimal pain. Discussed boot and follow-up with podiatry. Order placed.

## 2024-04-19 ENCOUNTER — TELEPHONE (OUTPATIENT)
Dept: ORTHOPEDICS | Facility: CLINIC | Age: 24
End: 2024-04-19

## 2024-04-19 NOTE — TELEPHONE ENCOUNTER
Orthopedic/Sports Medicine Fracture Triage    Incoming call/or message from call center member.    Fracture type: Ankle/foot    The patient is in a   Boot .    Date of injury 3/26/24.    Triaged by: Dr. Álvarez .    Determined to be managed Non operatively.    Needs to be seen within 1 week.    Additional Comments/information: Per Dr. Álvarez, patient would need weightbearing x-rays, as it is difficult to determine surgical vs non-surgical with non-WB x-rays.  But likely, this is a non-surgical case and patient should follow up with sports medicine with weightbearing XR.    Angel Zhao, ATC

## 2024-04-19 NOTE — TELEPHONE ENCOUNTER
What is the Concern:  Fracture  Date the concern started: 03/23  Injury related? yes  Is this related to recent surgery?no  Laceration?  No  Body part affected: L Ankle   Has Patient been evaluated for condition? yes  Location the patient was evaluated and treated for the condition?   Primary Care, Location [facility]   Who is referring provider, (name and clinic location) Dr. Tejal Escobar  What images have been done? CT; Location and City where images were taken:  On file done at [facility]    Could we send this information to you in [employer] or would you prefer to receive a phone call?:   Patient would prefer a phone call   Okay to leave a detailed message?: Yes at Cell number on file:    Telephone Information:   Mobile [phone]

## 2024-04-26 ENCOUNTER — OFFICE VISIT (OUTPATIENT)
Dept: PODIATRY | Facility: CLINIC | Age: 24
End: 2024-04-26
Payer: MEDICARE

## 2024-04-26 VITALS
SYSTOLIC BLOOD PRESSURE: 124 MMHG | BODY MASS INDEX: 23.8 KG/M2 | DIASTOLIC BLOOD PRESSURE: 70 MMHG | HEIGHT: 71 IN | WEIGHT: 170 LBS

## 2024-04-26 DIAGNOSIS — S92.252A CLOSED AVULSION FRACTURE OF NAVICULAR BONE OF LEFT FOOT, INITIAL ENCOUNTER: ICD-10-CM

## 2024-04-26 DIAGNOSIS — S92.022D CLOSED DISPLACED FRACTURE OF ANTERIOR PROCESS OF LEFT CALCANEUS WITH ROUTINE HEALING, SUBSEQUENT ENCOUNTER: Primary | ICD-10-CM

## 2024-04-26 DIAGNOSIS — S99.912A ANKLE INJURY, LEFT, INITIAL ENCOUNTER: ICD-10-CM

## 2024-04-26 PROCEDURE — 99203 OFFICE O/P NEW LOW 30 MIN: CPT | Performed by: PODIATRIST
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
## 2024-04-26 NOTE — PATIENT INSTRUCTIONS
Thank you for choosing [facility]!    DR. GRANT'S CLINIC LOCATIONS:     [facility] TRIAGE LINE: [phone]   [address] APPOINTMENTS: [phone]   [place] [address] RADIOLOGY: [phone]   (Every other Tues - Wed - Fri PM) SET UP SURGERY: [phone]    PHYSICAL THERAPY: [phone]   [facility] SPECIALTY BILLING QUESTIONS: [phone]   [address] FAX: [phone]   [place] [address]    (Thurs & Fri AM)

## 2024-05-06 ENCOUNTER — MYC MEDICAL ADVICE (OUTPATIENT)
Dept: PODIATRY | Facility: CLINIC | Age: 24
End: 2024-05-06
Payer: MEDICARE

## 2024-05-06 DIAGNOSIS — S99.912A ANKLE INJURY, LEFT, INITIAL ENCOUNTER: ICD-10-CM

## 2024-05-06 DIAGNOSIS — S92.252A CLOSED AVULSION FRACTURE OF NAVICULAR BONE OF LEFT FOOT, INITIAL ENCOUNTER: ICD-10-CM

## 2024-05-06 DIAGNOSIS — S92.022D CLOSED DISPLACED FRACTURE OF ANTERIOR PROCESS OF LEFT CALCANEUS WITH ROUTINE HEALING, SUBSEQUENT ENCOUNTER: Primary | ICD-10-CM

## 2024-05-07 NOTE — TELEPHONE ENCOUNTER
Please see [employer] request for physical therapy and advise.   Orders pending completion if appropriate.     ALVARO Cleary RN
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
## 2024-05-16 ENCOUNTER — THERAPY VISIT (OUTPATIENT)
Dept: PHYSICAL THERAPY | Facility: CLINIC | Age: 24
End: 2024-05-16
Attending: PODIATRIST
Payer: MEDICARE

## 2024-05-16 DIAGNOSIS — S99.912A ANKLE INJURY, LEFT, INITIAL ENCOUNTER: ICD-10-CM

## 2024-05-16 DIAGNOSIS — S92.022D CLOSED DISPLACED FRACTURE OF ANTERIOR PROCESS OF LEFT CALCANEUS WITH ROUTINE HEALING, SUBSEQUENT ENCOUNTER: ICD-10-CM

## 2024-05-16 DIAGNOSIS — S92.252A CLOSED AVULSION FRACTURE OF NAVICULAR BONE OF LEFT FOOT, INITIAL ENCOUNTER: ICD-10-CM

## 2024-05-16 PROCEDURE — 97110 THERAPEUTIC EXERCISES: CPT | Mod: GP | Performed by: PHYSICAL THERAPIST

## 2024-05-16 PROCEDURE — 97161 PT EVAL LOW COMPLEX 20 MIN: CPT | Mod: GP | Performed by: PHYSICAL THERAPIST

## 2024-05-16 ASSESSMENT — ACTIVITIES OF DAILY LIVING (ADL)
GOING_UP_OR_DOWN_10_STAIRS: NO DIFFICULTY
MAKING_SHARP_TURNS_WHILE_RUNNING_FAST: QUITE A BIT OF DIFFICULTY
SHOPPING: MODERATE DIFFICULTY
RUNNING_ON_UNEVEN_GROUND: QUITE A BIT OF DIFFICULTY
LEFS_RAW_SCORE: 0
GETTING_INTO_OR_OUT_OF_A_CAR: NO DIFFICULTY
WALKING_A_MILE: NO DIFFICULTY
ANY_OF_YOUR_USUAL_WORK,_HOUSEWORK_OR_SCHOOL_ACTIVITIES: NO DIFFICULTY
PLEASE_INDICATE_YOR_PRIMARY_REASON_FOR_REFERRAL_TO_THERAPY:: FOOT AND/OR ANKLE
ROLLING_OVER_IN_BED: NO DIFFICULTY
LIFTING_AN_OBJECT,_LIKE_A_BAG_OF_GROCERIES_FROM_THE_FLOOR: NO DIFFICULTY
SQUATTING: NO DIFFICULTY
GETTING_INTO_AND_OUT_OF_A_BATH: NO DIFFICULTY
PERFORMING_LIGHT_ACTIVITIES_AROUND_YOUR_HOME: NO DIFFICULTY
YOUR_USUAL_HOBBIES,_RECREATIONAL_OR_SPORTING_ACTIVITIES: A LITTLE BIT OF DIFFICULTY
STANDING_FOR_1_HOUR: A LITTLE BIT OF DIFFICULTY
PUTTING_ON_YOUR_SHOES_OR_SOCKS: NO DIFFICULTY
SITTING_FOR_1_HOUR: NO DIFFICULTY
WALKING_BETWEEN_ROOMS: NO DIFFICULTY
RUNNING_ON_EVEN_GROUND: QUITE A BIT OF DIFFICULTY
PERFORMING_HEAVY_ACTIVITIES_AROUND_YOUR_HOME: MODERATE DIFFICULTY
WALKING_2_BLOCKS: NO DIFFICULTY
LEFS_SCORE(%): 0

## 2024-05-30 ENCOUNTER — TELEPHONE (OUTPATIENT)
Dept: OBGYN | Facility: CLINIC | Age: 24
End: 2024-05-30

## 2024-05-30 ENCOUNTER — THERAPY VISIT (OUTPATIENT)
Dept: PHYSICAL THERAPY | Facility: CLINIC | Age: 24
End: 2024-05-30
Attending: PODIATRIST
Payer: MEDICARE

## 2024-05-30 DIAGNOSIS — S99.912A ANKLE INJURY, LEFT, INITIAL ENCOUNTER: ICD-10-CM

## 2024-05-30 DIAGNOSIS — S92.022D CLOSED DISPLACED FRACTURE OF ANTERIOR PROCESS OF LEFT CALCANEUS WITH ROUTINE HEALING, SUBSEQUENT ENCOUNTER: Primary | ICD-10-CM

## 2024-05-30 DIAGNOSIS — S92.252A CLOSED AVULSION FRACTURE OF NAVICULAR BONE OF LEFT FOOT, INITIAL ENCOUNTER: ICD-10-CM

## 2024-05-30 PROCEDURE — 97112 NEUROMUSCULAR REEDUCATION: CPT | Mod: GP | Performed by: PHYSICAL THERAPIST

## 2024-05-30 PROCEDURE — 97110 THERAPEUTIC EXERCISES: CPT | Mod: GP | Performed by: PHYSICAL THERAPIST

## 2024-06-04 ENCOUNTER — OFFICE VISIT (OUTPATIENT)
Dept: FAMILY MEDICINE | Facility: CLINIC | Age: 24
End: 2024-06-04
Payer: MEDICARE

## 2024-06-04 VITALS
RESPIRATION RATE: 18 BRPM | DIASTOLIC BLOOD PRESSURE: 70 MMHG | OXYGEN SATURATION: 100 % | TEMPERATURE: 97.5 F | SYSTOLIC BLOOD PRESSURE: 112 MMHG | BODY MASS INDEX: 30.78 KG/M2 | HEIGHT: 71 IN | HEART RATE: 75 BPM | WEIGHT: 219.9 LBS

## 2024-06-04 DIAGNOSIS — Z76.89 ESTABLISHING CARE WITH NEW DOCTOR, ENCOUNTER FOR: Primary | ICD-10-CM

## 2024-06-04 DIAGNOSIS — Z23 ENCOUNTER FOR VACCINATION: ICD-10-CM

## 2024-06-04 DIAGNOSIS — J45.20 MILD INTERMITTENT ASTHMA WITHOUT COMPLICATION: ICD-10-CM

## 2024-06-04 DIAGNOSIS — L81.9 HYPERPIGMENTATION OF SKIN: ICD-10-CM

## 2024-06-04 PROCEDURE — 90471 IMMUNIZATION ADMIN: CPT | Performed by: FAMILY MEDICINE

## 2024-06-04 PROCEDURE — 90651 9VHPV VACCINE 2/3 DOSE IM: CPT | Performed by: FAMILY MEDICINE

## 2024-06-04 PROCEDURE — 99213 OFFICE O/P EST LOW 20 MIN: CPT | Mod: 25 | Performed by: FAMILY MEDICINE

## 2024-06-04 RX ORDER — ALBUTEROL SULFATE 90 UG/1
2 AEROSOL, METERED RESPIRATORY (INHALATION) EVERY 6 HOURS PRN
Qty: 18 G | Refills: 1 | Status: SHIPPED | OUTPATIENT
Start: 2024-06-04

## 2024-06-04 RX ORDER — TRAZODONE HYDROCHLORIDE 50 MG/1
50 TABLET, FILM COATED ORAL AT BEDTIME
COMMUNITY
Start: 2024-04-11 | End: 2024-06-04

## 2024-06-04 ASSESSMENT — PATIENT HEALTH QUESTIONNAIRE - PHQ9
SUM OF ALL RESPONSES TO PHQ QUESTIONS 1-9: 0
SUM OF ALL RESPONSES TO PHQ QUESTIONS 1-9: 0
10. IF YOU CHECKED OFF ANY PROBLEMS, HOW DIFFICULT HAVE THESE PROBLEMS MADE IT FOR YOU TO DO YOUR WORK, TAKE CARE OF THINGS AT HOME, OR GET ALONG WITH OTHER PEOPLE: NOT DIFFICULT AT ALL

## 2024-06-04 ASSESSMENT — PAIN SCALES - GENERAL: PAINLEVEL: NO PAIN (0)

## 2024-06-04 NOTE — PATIENT INSTRUCTIONS
For your asthma inhaler, take before physical activity or exercise.    Using [employer]:    [employer] messages are a good place for us to keep in touch or if you have a quick question.    [facility] is not an appropriate place for providers to evaluate or give medical advice and for any urgent issues. Any evaluation of illness or symptoms should be done with a visit.      All [employer] messages and calls are screened by our nurses, they will answer questions if able and may forward it to the provider if they are unable to help. This process can take several days to get a response. Providers are also seeing patients all-day and can only check messages when there is a break in the schedule. Please also keep in mind that we do not check messages of our days off and over the weekend, this is why there is a nurse triage system.

## 2024-06-04 NOTE — PROGRESS NOTES
"  Assessment & Plan     Establishing care with new doctor, encounter for    Mild intermittent asthma without complication  -Hx of asthma, exercise induced.   -Discussed using albuterol inhaler prior to exercise, follow-up if not effective  - albuterol (PROAIR HFA/PROVENTIL HFA/VENTOLIN HFA) 108 (90 Base) MCG/ACT inhaler  Dispense: 18 g; Refill: 1    Hyperpigmentation of skin  -Post- scab. Discussed normal skin healing. Should fade with time.    Encounter for vaccination  - HPV9 (GARDASIL 9)            Subjective   Bibiana is a 23 year old, presenting for the following health issues:  Establish Care        6/4/2024     3:25 PM   Additional Questions   Roomed by Renee BULLOCK   Accompanied by nurse, Tahmina     History of Present Illness       Reason for visit:  Asthma pump and breast examination    She eats 0-1 servings of fruits and vegetables daily.She consumes 0 sweetened beverage(s) daily.She exercises with enough effort to increase her heart rate 9 or less minutes per day.  She exercises with enough effort to increase her heart rate 3 or less days per week.   She is taking medications regularly.     Patient here to establish care. With her nurse, Tahmina.  Pt has hx of Bipolar 1. Follows with Dr. Doyle with [facility].   Tahmina helps with setting up meds and medical appointments, also gives her her Invega shots. She sees Tahmina twice a month. Also has a .  Pt lives independently. Has family in [place].    Reports history of childhood asthma. Has been trying to get back into exercising. Notes out of breath and wheezing with running. Hoping to get an inhaler for exercise.      Scab on left breast a few weeks ago. Picked at it. Skin has healed but area is still discolored, would like it looked at.                     Objective    /70 (BP Location: Right arm, Patient Position: Sitting, Cuff Size: Adult Regular)   Pulse 75   Temp 97.5  F (36.4  C) (Temporal)   Resp 18   Ht 1.8 m (5' 10.87\")   Wt " 99.7 kg (219 lb 14.4 oz)   SpO2 100%   BMI 30.79 kg/m    Body mass index is 30.79 kg/m .  Physical Exam   GENERAL: alert and no distress  SKIN: left inner lower quad, dime sized area of increased pigmentation, no other changes.  PSYCH: mentation appears normal, affect normal/bright            Signed Electronically by: Mathieu Meyer DO

## 2024-06-04 NOTE — COMMUNITY RESOURCES LIST (ENGLISH)
June 4, 2024           YOUR PERSONALIZED LIST OF SERVICES & PROGRAMS           NAVIGATION    Eligibility Screening      [facility] ([facility]) [facility] application assistance  [address] [place] [address] (Distance: 1.3 miles)  Phone: [phone]  Language: English  Fee: Free  Accessibility: Translation services      [facility]  [address] [place] [address] (Distance: 1.2 miles)  Phone: [phone]  Website: https://TourRadar[facility] Regional Healthlifeaction games.uSpeak/seniors/  Language: English, Kosovan  Fee: Free, Self pay, Sliding scale  Accessibility: Translation services      [employer] [place] - SNAP (formerly food stamps) Screening and Application help  Phone: [phone]) [phone]  Website: https://www.Behance.org/programs/[place]-food-helpline/  Language: English  Hours: Mon 10:00 AM - 5:00 PM Tue 10:00 AM - 5:00 PM Wed 10:00 AM - 5:00 PM Thu 10:00 AM - 5:00 PM Fri 10:00 AM - 5:00 PM  Fee: Free  Accessibility: [place] accessible, Blind accommodation, Deaf or hard of hearing, Translation services        ASSISTANCE    [facility] application assistance  [address] [place] [address] (Distance: 1.7 miles)  Phone: [phone]  Language: English  Fee: Free      [facility] (CAP) [facility] application assistance  [address] [place] [address] (Distance: 1.3 miles)  Phone: [phone]  Language: English  Fee: Free  Accessibility: Translation services      [employer] [place] - SNAP (formerly food stamps) Screening and Application help  Phone: (454) 434-4694  Website: https://www.Behance.org/programs/[place]-food-helpline/  Language: English  Hours: Mon 10:00 AM - 5:00 PM Tue 10:00 AM - 5:00 PM Wed 10:00 AM - 5:00 PM Thu 10:00 AM - 5:00 PM Fri 10:00 AM - 5:00 PM  Fee: Free  Accessibility: Ada accessible, Blind accommodation, Deaf or  hard of hearing, Translation services    [facility] [place] - Food in the [facility] at [facility]  [address] [place] [address] (Distance: 9.5 miles)  Phone: [phone]) [phone]  Website: https://www.goodinthehood.org/our-programs/feeding-the-future/food-in-the-roy/  Language: English  Fee: Free  Accessibility: [place] accessible      [facility] - Food pantry  [address] [place] [address] (Distance: 4.4 miles)  Phone: [phone]) [phone]  Website: http://www.[employer]/  Language: English  Fee: Free      [employer] [employer] [facility] - [place] [facility]  Phone: [phone]) [phone]  Website: www.[employer]  Language: English, Peruvian  Hours: Mon 9:00 AM - 3:30 PM Tue 9:00 AM - 6:30 PM Wed 9:00 AM - 3:30 PM Thu 9:00 AM - 12:30 PM Fri 9:00 AM - 12:30 PM Sat 9:00 AM - 12:00 PM  Fee: Free               IMPORTANT NUMBERS & WEBSITES        Emergency Services  911  .   [facility]  211 http://211unitedway.org  .   Poison Control  (212) 668-7451 http://[place]poison.org http://wis[place]poison.org  .     Suicide and Crisis Lifeline  988 http://988lifeline.org  .   Childhelp National Child Abuse Hotline  325.236.2574 http://Childhelphotline.org   .   National Sexual Assault Hotline  (875) 381-7380 (HOPE) http://Rainn.org   .     National Runaway Safeline  (131) 497-5745 (RUNAWAY) http://1800runaway.org  .   Pregnancy & Postpartum Support  Call/text 729-621-2687  [place]: http://ppsupportmn.org  [place]: http://AdStage.com/wi  .   Substance Abuse National Helpline ([facility])  656-838-HELP (7635) http://Findtreatment.gov   .                DISCLAIMER: These resources have been generated via the [employer] Platform. [employer] does not endorse any service providers mentioned in this resource list. [facility] does not guarantee that the services mentioned in this resource list will be available to you or will improve your health or wellness.    [facility]

## 2024-06-04 NOTE — NURSING NOTE
Prior to immunization administration, verified patients identity using patient s name and date of birth. Please see Immunization Activity for additional information.     Screening Questionnaire for Adult Immunization    Are you sick today?   No   Do you have allergies to medications, food, a vaccine component or latex?   No   Have you ever had a serious reaction after receiving a vaccination?   No   Do you have a long-term health problem with heart, lung, kidney, or metabolic disease (e.g., diabetes), asthma, a blood disorder, no spleen, complement component deficiency, a cochlear implant, or a spinal fluid leak?  Are you on long-term aspirin therapy?   No   Do you have cancer, leukemia, HIV/AIDS, or any other immune system problem?   No   Do you have a parent, brother, or sister with an immune system problem?   No   In the past 3 months, have you taken medications that affect  your immune system, such as prednisone, other steroids, or anticancer drugs; drugs for the treatment of rheumatoid arthritis, Crohn s disease, or psoriasis; or have you had radiation treatments?   No   Have you had a seizure, or a brain or other nervous system problem?   No   During the past year, have you received a transfusion of blood or blood    products, or been given immune (gamma) globulin or antiviral drug?   No   For women: Are you pregnant or is there a chance you could become       pregnant during the next month?   No   Have you received any vaccinations in the past 4 weeks?   No     Immunization questionnaire answers were all negative.      Patient instructed to remain in clinic for 15 minutes afterwards, and to report any adverse reactions.     Screening performed by Wendi Jung MA on 6/4/2024 at 3:52 PM.

## 2024-07-11 ENCOUNTER — TELEPHONE (OUTPATIENT)
Dept: FAMILY MEDICINE | Facility: CLINIC | Age: 24
End: 2024-07-11
Payer: MEDICARE

## 2024-07-11 NOTE — TELEPHONE ENCOUNTER
Forms/Letter Request    Type of form/letter: OTHER: Medical Expense Verification       Do we have the form/letter: Yes: Form folder CARE TEAM 2-I do not think we can complete this please advise where it could be routed to(billing etc)    Who is the form from? Patients apartment complex  (if other please explain)    Where did/will the form come from? form was faxed in    When is form/letter needed by: N/A    How would you like the form/letter returned: Fax-ALEJANDRA completed on form

## 2024-07-16 ENCOUNTER — OFFICE VISIT (OUTPATIENT)
Dept: FAMILY MEDICINE | Facility: CLINIC | Age: 24
End: 2024-07-16
Payer: MEDICARE

## 2024-07-16 VITALS
SYSTOLIC BLOOD PRESSURE: 120 MMHG | HEART RATE: 60 BPM | TEMPERATURE: 97.9 F | BODY MASS INDEX: 31.5 KG/M2 | OXYGEN SATURATION: 98 % | RESPIRATION RATE: 16 BRPM | WEIGHT: 225 LBS | HEIGHT: 71 IN | DIASTOLIC BLOOD PRESSURE: 70 MMHG

## 2024-07-16 DIAGNOSIS — L21.9 SEBORRHEIC DERMATITIS: Primary | ICD-10-CM

## 2024-07-16 PROCEDURE — 99213 OFFICE O/P EST LOW 20 MIN: CPT

## 2024-07-16 RX ORDER — KETOCONAZOLE 20 MG/ML
SHAMPOO TOPICAL DAILY PRN
Qty: 120 ML | Refills: 1 | Status: SHIPPED | OUTPATIENT
Start: 2024-07-16

## 2024-07-16 RX ORDER — BETAMETHASONE DIPROPIONATE 0.5 MG/ML
LOTION, AUGMENTED TOPICAL DAILY
Qty: 60 ML | Refills: 1 | Status: SHIPPED | OUTPATIENT
Start: 2024-07-16

## 2024-07-16 ASSESSMENT — ASTHMA QUESTIONNAIRES
QUESTION_3 LAST FOUR WEEKS HOW OFTEN DID YOUR ASTHMA SYMPTOMS (WHEEZING, COUGHING, SHORTNESS OF BREATH, CHEST TIGHTNESS OR PAIN) WAKE YOU UP AT NIGHT OR EARLIER THAN USUAL IN THE MORNING: NOT AT ALL
QUESTION_1 LAST FOUR WEEKS HOW MUCH OF THE TIME DID YOUR ASTHMA KEEP YOU FROM GETTING AS MUCH DONE AT WORK, SCHOOL OR AT HOME: NONE OF THE TIME
ACT_TOTALSCORE: 23
ACT_TOTALSCORE: 23
QUESTION_2 LAST FOUR WEEKS HOW OFTEN HAVE YOU HAD SHORTNESS OF BREATH: NOT AT ALL
QUESTION_5 LAST FOUR WEEKS HOW WOULD YOU RATE YOUR ASTHMA CONTROL: WELL CONTROLLED
QUESTION_4 LAST FOUR WEEKS HOW OFTEN HAVE YOU USED YOUR RESCUE INHALER OR NEBULIZER MEDICATION (SUCH AS ALBUTEROL): ONCE A WEEK OR LESS

## 2024-07-16 ASSESSMENT — PAIN SCALES - GENERAL: PAINLEVEL: NO PAIN (0)

## 2024-07-16 NOTE — PROGRESS NOTES
"  Assessment & Plan     Seborrheic dermatitis  Moderate-severe scalp seborrheic dermatitis.  Will treat with antifungal shampoo and high potency topical corticosteroid for up to 4 weeks.  Provided education on how to use products.  If symptoms do not improve after 4 weeks may need to consider oral antifungal treatment.  Would need to check liver enzymes before treatment.  Referral placed to dermatology.   - ketoconazole (NIZORAL) 2 % external shampoo; Apply topically daily as needed for itching or irritation  - augmented betamethasone dipropionate (DIPROLENE) 0.05 % external lotion; Apply topically daily  - Adult Dermatology  Referral; Future    BMI  Estimated body mass index is 31.38 kg/m  as calculated from the following:    Height as of this encounter: 1.803 m (5' 11\").    Weight as of this encounter: 102.1 kg (225 lb).     Subjective   Bibiana is a 23 year old, presenting for the following health issues:  Hair/Scalp Problem (Dry scalp, dandruff, itchiness. Patient states that it has been like this for the past 6 months. Patient has seen a hairstylist for a wash and scalp mask.)      7/16/2024     7:39 AM   Additional Questions   Roomed by Brenda AGEE CMA     Hair/Scalp Problem    History of Present Illness       Reason for visit:  Dermatolgist refreall and scalp treatment    She eats 0-1 servings of fruits and vegetables daily.She consumes 0 sweetened beverage(s) daily.She exercises with enough effort to increase her heart rate 30 to 60 minutes per day.  She exercises with enough effort to increase her heart rate 3 or less days per week.   She is taking medications regularly.     Patient is a 23 year old female presenting for dry scalp.  Medical history includes depression, PTSD, and bipolar.  Reports dry flaking itchy scalp for the past 6 months.  Fluctuating course of improvement then worsening of symptoms.  Denies other skin areas of concern.  No other associated symptoms such as skin erythema, drainage, " "pain, fever.  No recent changes in hair products.      Review of Systems  Review of systems negative otherwise known HPI.    Past Medical History:   Diagnosis Date    PTSD (post-traumatic stress disorder)      No past surgical history on file.    Family History   Problem Relation Age of Onset    Cancer Maternal Grandfather      Social History     Tobacco Use    Smoking status: Never    Smokeless tobacco: Never   Substance Use Topics    Alcohol use: Yes     Comment: occ     Current Outpatient Medications   Medication Sig Dispense Refill    albuterol (PROAIR HFA/PROVENTIL HFA/VENTOLIN HFA) 108 (90 Base) MCG/ACT inhaler Inhale 2 puffs into the lungs every 6 hours as needed for shortness of breath, wheezing or cough 18 g 1    augmented betamethasone dipropionate (DIPROLENE) 0.05 % external lotion Apply topically daily 60 mL 1    divalproex sodium extended-release (DEPAKOTE ER) 250 MG 24 hr tablet       INVEGA SUSTENNA 156 MG/ML CASTRO injection       ketoconazole (NIZORAL) 2 % external shampoo Apply topically daily as needed for itching or irritation 120 mL 1    Melatonin 10 MG TABS tablet Take 10 mg by mouth       Current Facility-Administered Medications   Medication Dose Route Frequency Provider Last Rate Last Admin    medroxyPROGESTERone (DEPO-PROVERA) injection 150 mg  150 mg Intramuscular Q90 Days Kasia Cuevas MD   150 mg at 02/08/24 1114         Objective    /70 (BP Location: Left arm, Patient Position: Sitting, Cuff Size: Adult Regular)   Pulse 60   Temp 97.9  F (36.6  C) (Temporal)   Resp 16   Ht 1.803 m (5' 11\")   Wt 102.1 kg (225 lb)   SpO2 98%   BMI 31.38 kg/m    Body mass index is 31.38 kg/m .  Physical Exam   General: Alert, oriented, no acute distress.    Scalp: Diffuse moderate-severe flaking of scalp. No areas concerning for infection.   Respiratory: Easy work of breathing.   Cardiovascular: Appears warm and well-perfused.    Skin: No abnormalities noted on visualized skin.   Neurologic: " Mentation intact and speech normal.     Psychiatric: Appropriate affect.     Signed Electronically by: RANJAN Sandra CNP

## 2024-07-16 NOTE — PATIENT INSTRUCTIONS
Ketoconazole shampoo  5-10 mL of shampoo should be left on for three to five minutes before rinsing off. Ketoconazole shampoo or other antifungal shampoos should be used two to three times per week for two to four weeks in the initial treatment phase. Subsequently, the use of the medicated shampoo can be reduced to once a week to prevent relapse. Minor adverse effects, such as irritation and/or burning sensation, are common with antifungal shampoo.    Topical steroid can be applied once daily until symptoms resolve for up to 4 weeks.    Follow up with dermatology if symptoms do not resolve with treatment.

## 2024-07-18 DIAGNOSIS — L21.9 SEBORRHEIC DERMATITIS: Primary | ICD-10-CM

## 2024-08-27 ENCOUNTER — TRANSFERRED RECORDS (OUTPATIENT)
Dept: HEALTH INFORMATION MANAGEMENT | Facility: CLINIC | Age: 24
End: 2024-08-27

## 2024-09-07 ENCOUNTER — OFFICE VISIT (OUTPATIENT)
Dept: URGENT CARE | Facility: URGENT CARE | Age: 24
End: 2024-09-07
Payer: MEDICARE

## 2024-09-07 VITALS
TEMPERATURE: 97.1 F | DIASTOLIC BLOOD PRESSURE: 70 MMHG | RESPIRATION RATE: 19 BRPM | WEIGHT: 225 LBS | OXYGEN SATURATION: 98 % | BODY MASS INDEX: 31.38 KG/M2 | HEART RATE: 85 BPM | SYSTOLIC BLOOD PRESSURE: 128 MMHG

## 2024-09-07 DIAGNOSIS — J06.9 UPPER RESPIRATORY TRACT INFECTION, UNSPECIFIED TYPE: ICD-10-CM

## 2024-09-07 DIAGNOSIS — R05.1 ACUTE COUGH: Primary | ICD-10-CM

## 2024-09-07 LAB
DEPRECATED S PYO AG THROAT QL EIA: NEGATIVE
FLUAV AG SPEC QL IA: NEGATIVE
FLUBV AG SPEC QL IA: NEGATIVE

## 2024-09-07 PROCEDURE — 87804 INFLUENZA ASSAY W/OPTIC: CPT

## 2024-09-07 PROCEDURE — 87635 SARS-COV-2 COVID-19 AMP PRB: CPT | Performed by: PHYSICIAN ASSISTANT

## 2024-09-07 PROCEDURE — 87651 STREP A DNA AMP PROBE: CPT | Performed by: PHYSICIAN ASSISTANT

## 2024-09-07 PROCEDURE — 99213 OFFICE O/P EST LOW 20 MIN: CPT | Performed by: PHYSICIAN ASSISTANT

## 2024-09-07 ASSESSMENT — ENCOUNTER SYMPTOMS
COUGH: 1
CHEST TIGHTNESS: 0
FEVER: 0
WHEEZING: 0
CHILLS: 0
SHORTNESS OF BREATH: 0

## 2024-09-07 ASSESSMENT — PAIN SCALES - GENERAL: PAINLEVEL: NO PAIN (0)

## 2024-09-07 NOTE — PATIENT INSTRUCTIONS
Increase fluids with water, Gatorade, or rehydrating beverages. Alternate acetaminophen and Ibuprofen as needed for aches, pains or fever. For cough I suggest using over the counter medications such as dextromethorphan (Delsym) or guaifenesin (Mucinex).  Delsym is a cough suppressant.  Mucinex will reduce the viscosity of mucus secretions and help with productive cough. Drink plenty of fluids while on Mucinex.  Follow up in clinic if symptoms persist or worsen. This usually can last 7-10 days.

## 2024-09-07 NOTE — PROGRESS NOTES
Assessment & Plan        1. Upper respiratory tract infection, unspecified type    -Symptomatic and supportive treatment recommended.  Recommended Tylenol and ibuprofen as needed for aches and pains.  For congestion recommended saline nasal rinses.  Advised patient that symptoms are self-limiting usually resolve in 7 to 10 days.  -Strep is negative  -Flu A and B-  -COVID-19 PCR test is pending    2. Acute cough    - Influenza A & B Antigen - Clinic Collect  - Streptococcus A Rapid Screen w/Reflex to PCR - Clinic Collect  - Symptomatic COVID-19 Virus (Coronavirus) by PCR  - Group A Streptococcus PCR Throat Swab    Results for orders placed or performed in visit on 09/07/24   Influenza A & B Antigen - Clinic Collect     Status: Normal    Specimen: Nose; Swab   Result Value Ref Range    Influenza A antigen Negative Negative    Influenza B antigen Negative Negative    Narrative    Test results must be correlated with clinical data. If necessary, results should be confirmed by a molecular assay or viral culture.   Streptococcus A Rapid Screen w/Reflex to PCR - Clinic Collect     Status: Normal    Specimen: Throat; Swab   Result Value Ref Range    Group A Strep antigen Negative Negative       Patient Instructions   Increase fluids with water, Gatorade, or rehydrating beverages. Alternate acetaminophen and Ibuprofen as needed for aches, pains or fever. For cough I suggest using over the counter medications such as dextromethorphan (Delsym) or guaifenesin (Mucinex).  Delsym is a cough suppressant.  Mucinex will reduce the viscosity of mucus secretions and help with productive cough. Drink plenty of fluids while on Mucinex.  Follow up in clinic if symptoms persist or worsen. This usually can last 7-10 days.       Return if symptoms worsen or fail to improve, for Follow up.    At the end of the encounter, I discussed results, diagnosis, medications. Discussed red flags for immediate return to clinic/ER, as well as indications  for follow up if no improvement. Patient understood and agreed to plan. Patient was stable for discharge.    Zane Mccarty is a 24 year old female who presents to clinic today for the following health issues:  Chief Complaint   Patient presents with    Urgent Care     Sore throat  Cough  Mucus       HPI    Patient reports sore throat, cough, congestion for 2 days.  She is mostly concerned about the sore throat and the congestion.  She has been taking vitamin C.  No analgesics taken.    Review of Systems   Constitutional:  Negative for chills and fever.   HENT:  Positive for congestion.    Respiratory:  Positive for cough. Negative for chest tightness, shortness of breath and wheezing.        Problem List:  2024-05: Closed displaced fracture of anterior process of left   calcaneus with routine healing, subsequent encounter  2024-05: Closed avulsion fracture of navicular bone of left foot,   initial encounter  2024-05: Ankle injury, left, initial encounter  2022-11: Bipolar I disorder, most recent episode (or current) manic,   moderate (H)  2021-11: Bipolar I disorder, current or most recent episode manic,   with psychotic features (H)  2021-11: Vaginal pain  2021-11: PTSD (post-traumatic stress disorder)  2021-11: Psychosis (H)  2021-10: COVID-19  2021-09: Bipolar affective disorder in remission (H24)  2021-09: Severe episode of recurrent major depressive disorder, with   psychotic features (H)  2021-09: Suicidal ideation      Past Medical History:   Diagnosis Date    PTSD (post-traumatic stress disorder)        Social History     Tobacco Use    Smoking status: Never    Smokeless tobacco: Never   Substance Use Topics    Alcohol use: Yes     Comment: occ           Objective    /70 (BP Location: Right arm, Patient Position: Sitting, Cuff Size: Adult Regular)   Pulse 85   Temp 97.1  F (36.2  C) (Temporal)   Resp 19   Wt 102.1 kg (225 lb)   SpO2 98%   BMI 31.38 kg/m    Physical Exam  Constitutional:        Appearance: Normal appearance.   HENT:      Head: Normocephalic.      Right Ear: Tympanic membrane normal.      Left Ear: Tympanic membrane normal.      Nose: Congestion present.      Mouth/Throat:      Mouth: Mucous membranes are moist.      Pharynx: Oropharynx is clear. Uvula midline. No posterior oropharyngeal erythema.   Cardiovascular:      Rate and Rhythm: Normal rate and regular rhythm.   Pulmonary:      Effort: Pulmonary effort is normal.      Breath sounds: Normal breath sounds.   Lymphadenopathy:      Head:      Right side of head: No submental, submandibular or tonsillar adenopathy.      Left side of head: No submental, submandibular or tonsillar adenopathy.      Cervical: No cervical adenopathy.      Right cervical: No superficial cervical adenopathy.     Left cervical: No superficial cervical adenopathy.   Skin:     General: Skin is warm and dry.      Findings: No rash.   Neurological:      Mental Status: She is alert.   Psychiatric:         Mood and Affect: Mood normal.         Behavior: Behavior normal.              Bettye Kyle PA-C

## 2024-09-08 LAB
GROUP A STREP BY PCR: NOT DETECTED
SARS-COV-2 RNA RESP QL NAA+PROBE: NEGATIVE

## 2024-09-17 DIAGNOSIS — L21.9 SEBORRHEIC DERMATITIS: ICD-10-CM

## 2024-09-18 ENCOUNTER — OFFICE VISIT (OUTPATIENT)
Dept: FAMILY MEDICINE | Facility: CLINIC | Age: 24
End: 2024-09-18
Payer: MEDICARE

## 2024-09-18 VITALS
WEIGHT: 229 LBS | HEIGHT: 71 IN | OXYGEN SATURATION: 100 % | HEART RATE: 59 BPM | RESPIRATION RATE: 15 BRPM | TEMPERATURE: 97.1 F | BODY MASS INDEX: 32.06 KG/M2 | SYSTOLIC BLOOD PRESSURE: 120 MMHG | DIASTOLIC BLOOD PRESSURE: 76 MMHG

## 2024-09-18 DIAGNOSIS — Z76.89 ENCOUNTER FOR WEIGHT MANAGEMENT: ICD-10-CM

## 2024-09-18 DIAGNOSIS — G47.00 INSOMNIA, UNSPECIFIED TYPE: Primary | ICD-10-CM

## 2024-09-18 PROCEDURE — 99213 OFFICE O/P EST LOW 20 MIN: CPT | Performed by: FAMILY MEDICINE

## 2024-09-18 RX ORDER — KETOCONAZOLE 20 MG/ML
SHAMPOO TOPICAL DAILY PRN
Qty: 120 ML | Refills: 1 | OUTPATIENT
Start: 2024-09-18

## 2024-09-18 RX ORDER — BETAMETHASONE DIPROPIONATE 0.5 MG/G
LOTION TOPICAL DAILY
Qty: 60 ML | OUTPATIENT
Start: 2024-09-18

## 2024-09-18 RX ORDER — PHENOL 1.4 %
10 AEROSOL, SPRAY (ML) MUCOUS MEMBRANE
Status: CANCELLED | OUTPATIENT
Start: 2024-09-18

## 2024-09-18 RX ORDER — PHENOL 1.4 %
10 AEROSOL, SPRAY (ML) MUCOUS MEMBRANE
Qty: 30 TABLET | Refills: 0 | Status: SHIPPED | OUTPATIENT
Start: 2024-09-18

## 2024-09-18 ASSESSMENT — PAIN SCALES - GENERAL: PAINLEVEL: NO PAIN (0)

## 2024-09-18 NOTE — TELEPHONE ENCOUNTER
Cari calling from [facility] on this.  States pt had lost last RX of these medications and is also going to be going out of state starting the 26th and will need refill to get done prior to that so she has some while she is gone    Please call Cari with [facility] with questions [phone]

## 2024-09-18 NOTE — PROGRESS NOTES
"  Assessment & Plan     Insomnia, unspecified type  -Takes melatonin intermittently for sleep  - Melatonin 10 MG TABS tablet  Dispense: 30 tablet; Refill: 0    Encounter for weight management  -Pt wanting to try injectable medication.  -Discussed importance of working on healthy diet along with exercise.  -Pt is moving back home to [place] in two weeks, unlikely to get in to see weight  before then. Recommended discussing weight management med options with new PCP.          BMI  Estimated body mass index is 31.71 kg/m  as calculated from the following:    Height as of this encounter: 1.81 m (5' 11.26\").    Weight as of this encounter: 103.9 kg (229 lb).             Subjective   Bibiana is a 24 year old, presenting for the following health issues:  Weight Loss (Weight Loss )        9/18/2024     8:54 AM   Additional Questions   Roomed by Vannessa Claros     History of Present Illness       Reason for visit:  Weoght loss mangement medications referall   She is taking medications regularly.     Here today to discuss weight loss medication.   Works out regularly. States lives on her own and difficult to afford healthy food.  Wants to lose at least 50 lbs.   Would like to try injectable weight loss medication.  States moving back to [place] in two weeks. Has appointment to establish new primary there.    Would like refill of her melatonin. Takes 10 mg about once a week for insomnia.                    Objective    /76 (BP Location: Right arm, Patient Position: Sitting, Cuff Size: Adult Large)   Pulse 59   Temp 97.1  F (36.2  C) (Temporal)   Resp 15   Ht 1.81 m (5' 11.26\")   Wt 103.9 kg (229 lb)   SpO2 100%   BMI 31.71 kg/m    Body mass index is 31.71 kg/m .  Physical Exam   GENERAL: alert and no distress  PSYCH: mentation appears normal, affect normal/bright            Signed Electronically by: Mathieu Meyer DO    "

## 2024-09-24 ENCOUNTER — MYC MEDICAL ADVICE (OUTPATIENT)
Dept: FAMILY MEDICINE | Facility: CLINIC | Age: 24
End: 2024-09-24
Payer: MEDICARE

## 2024-09-24 DIAGNOSIS — L21.9 SEBORRHEIC DERMATITIS: ICD-10-CM

## 2024-09-24 RX ORDER — KETOCONAZOLE 20 MG/ML
SHAMPOO TOPICAL DAILY PRN
Qty: 120 ML | Refills: 1 | OUTPATIENT
Start: 2024-09-24

## 2024-09-24 RX ORDER — BETAMETHASONE DIPROPIONATE 0.5 MG/G
LOTION TOPICAL DAILY
Qty: 60 ML | OUTPATIENT
Start: 2024-09-24

## 2024-09-26 NOTE — TELEPHONE ENCOUNTER
Writer responded via [employer].  WILLIAM SahuN, RN-BC  [facility]

## 2024-10-02 ENCOUNTER — APPOINTMENT (OUTPATIENT)
Dept: INTERNAL MEDICINE | Age: 24
End: 2024-10-02

## 2024-10-02 VITALS
DIASTOLIC BLOOD PRESSURE: 65 MMHG | OXYGEN SATURATION: 98 % | SYSTOLIC BLOOD PRESSURE: 113 MMHG | HEART RATE: 85 BPM | RESPIRATION RATE: 16 BRPM | HEIGHT: 71 IN | WEIGHT: 234.13 LBS | BODY MASS INDEX: 32.78 KG/M2

## 2024-10-02 DIAGNOSIS — F31.32 BIPOLAR 1 DISORDER, DEPRESSED, MODERATE  (CMD): ICD-10-CM

## 2024-10-02 DIAGNOSIS — Z00.00 GENERAL MEDICAL EXAMINATION: Primary | ICD-10-CM

## 2024-10-02 RX ORDER — BETAMETHASONE DIPROPIONATE 0.5 MG/G
LOTION TOPICAL 2 TIMES DAILY
COMMUNITY

## 2024-10-02 RX ORDER — PALIPERIDONE PALMITATE 156 MG/ML
INJECTION INTRAMUSCULAR
COMMUNITY

## 2024-10-02 RX ORDER — DIVALPROEX SODIUM 250 MG/1
250 TABLET, DELAYED RELEASE ORAL 3 TIMES DAILY
Qty: 90 TABLET | Refills: 1 | Status: SHIPPED | OUTPATIENT
Start: 2024-10-02

## 2024-10-02 RX ORDER — PALIPERIDONE 6 MG/1
6 TABLET, EXTENDED RELEASE ORAL EVERY MORNING
COMMUNITY

## 2024-10-02 RX ORDER — PALIPERIDONE PALMITATE 156 MG/ML
156 INJECTION INTRAMUSCULAR
Qty: 1 ML | Refills: 3 | Status: SHIPPED | OUTPATIENT
Start: 2024-10-02

## 2024-10-02 RX ORDER — DIVALPROEX SODIUM 250 MG/1
250 TABLET, DELAYED RELEASE ORAL 3 TIMES DAILY
COMMUNITY
End: 2024-10-02 | Stop reason: SDUPTHER

## 2024-10-02 RX ORDER — KETOCONAZOLE 20 MG/ML
SHAMPOO TOPICAL PRN
COMMUNITY

## 2024-10-02 RX ORDER — IBUPROFEN 200 MG
200 TABLET ORAL EVERY 6 HOURS PRN
COMMUNITY

## 2024-10-02 ASSESSMENT — PATIENT HEALTH QUESTIONNAIRE - PHQ9
SUM OF ALL RESPONSES TO PHQ9 QUESTIONS 1 AND 2: 0
2. FEELING DOWN, DEPRESSED OR HOPELESS: NOT AT ALL
CLINICAL INTERPRETATION OF PHQ2 SCORE: NO FURTHER SCREENING NEEDED
SUM OF ALL RESPONSES TO PHQ9 QUESTIONS 1 AND 2: 0
1. LITTLE INTEREST OR PLEASURE IN DOING THINGS: NOT AT ALL

## 2024-10-02 ASSESSMENT — ENCOUNTER SYMPTOMS
ALLERGIC/IMMUNOLOGIC NEGATIVE: 1
PSYCHIATRIC NEGATIVE: 1
RESPIRATORY NEGATIVE: 1
GASTROINTESTINAL NEGATIVE: 1
EYES NEGATIVE: 1
CONSTITUTIONAL NEGATIVE: 1
ENDOCRINE NEGATIVE: 1
NEUROLOGICAL NEGATIVE: 1
HEMATOLOGIC/LYMPHATIC NEGATIVE: 1

## 2024-10-02 ASSESSMENT — PAIN SCALES - GENERAL: PAINLEVEL: 0

## 2024-10-03 ENCOUNTER — LAB SERVICES (OUTPATIENT)
Dept: LAB | Age: 24
End: 2024-10-03

## 2024-10-03 DIAGNOSIS — Z00.00 GENERAL MEDICAL EXAMINATION: ICD-10-CM

## 2024-10-03 LAB
ALBUMIN SERPL-MCNC: 3.7 G/DL (ref 3.4–5)
ALBUMIN/GLOB SERPL: 0.9 {RATIO} (ref 1–2.4)
ALP SERPL-CCNC: 85 UNITS/L (ref 45–117)
ALT SERPL-CCNC: 11 UNITS/L
ANION GAP SERPL CALC-SCNC: 8 MMOL/L (ref 7–19)
AST SERPL-CCNC: 19 UNITS/L
BILIRUB SERPL-MCNC: 0.3 MG/DL (ref 0.2–1)
BUN SERPL-MCNC: 20 MG/DL (ref 6–20)
BUN/CREAT SERPL: 26 (ref 7–25)
CALCIUM SERPL-MCNC: 10.2 MG/DL (ref 8.4–10.2)
CHLORIDE SERPL-SCNC: 104 MMOL/L (ref 97–110)
CHOLEST SERPL-MCNC: 207 MG/DL
CHOLEST/HDLC SERPL: 3.2 {RATIO}
CO2 SERPL-SCNC: 27 MMOL/L (ref 21–32)
CREAT SERPL-MCNC: 0.78 MG/DL (ref 0.51–0.95)
DEPRECATED RDW RBC: 49.9 FL (ref 39–50)
EGFRCR SERPLBLD CKD-EPI 2021: >90 ML/MIN/{1.73_M2}
ERYTHROCYTE [DISTWIDTH] IN BLOOD: 15 % (ref 11–15)
FASTING DURATION TIME PATIENT: ABNORMAL H
GLOBULIN SER-MCNC: 4.1 G/DL (ref 2–4)
GLUCOSE SERPL-MCNC: 93 MG/DL (ref 70–99)
HCT VFR BLD CALC: 42.9 % (ref 36–46.5)
HDLC SERPL-MCNC: 64 MG/DL
HGB BLD-MCNC: 13.8 G/DL (ref 12–15.5)
LDLC SERPL CALC-MCNC: 128 MG/DL
MCH RBC QN AUTO: 28.9 PG (ref 26–34)
MCHC RBC AUTO-ENTMCNC: 32.2 G/DL (ref 32–36.5)
MCV RBC AUTO: 89.9 FL (ref 78–100)
NONHDLC SERPL-MCNC: 143 MG/DL
NRBC BLD MANUAL-RTO: 0 /100 WBC
PLATELET # BLD AUTO: 229 K/MCL (ref 140–450)
POTASSIUM SERPL-SCNC: 4.2 MMOL/L (ref 3.4–5.1)
PROT SERPL-MCNC: 7.8 G/DL (ref 6.4–8.2)
RBC # BLD: 4.77 MIL/MCL (ref 4–5.2)
SODIUM SERPL-SCNC: 135 MMOL/L (ref 135–145)
TRIGL SERPL-MCNC: 77 MG/DL
WBC # BLD: 7.6 K/MCL (ref 4.2–11)

## 2024-10-04 LAB — HBA1C MFR BLD: 5.2 % (ref 4.5–5.6)

## 2024-10-22 ENCOUNTER — EXTERNAL RECORD (OUTPATIENT)
Dept: HEALTH INFORMATION MANAGEMENT | Facility: OTHER | Age: 24
End: 2024-10-22

## 2024-12-02 ENCOUNTER — TELEPHONE (OUTPATIENT)
Dept: OTOLARYNGOLOGY | Age: 24
End: 2024-12-02

## 2024-12-03 ENCOUNTER — APPOINTMENT (OUTPATIENT)
Dept: FAMILY MEDICINE | Age: 24
End: 2024-12-03

## 2024-12-03 VITALS
BODY MASS INDEX: 34.1 KG/M2 | DIASTOLIC BLOOD PRESSURE: 66 MMHG | HEART RATE: 67 BPM | OXYGEN SATURATION: 100 % | TEMPERATURE: 95.7 F | WEIGHT: 243.61 LBS | HEIGHT: 71 IN | SYSTOLIC BLOOD PRESSURE: 107 MMHG | RESPIRATION RATE: 18 BRPM

## 2024-12-03 DIAGNOSIS — Z11.3 SCREENING EXAMINATION FOR STD (SEXUALLY TRANSMITTED DISEASE): ICD-10-CM

## 2024-12-03 DIAGNOSIS — F31.9 BIPOLAR DEPRESSION  (CMD): Primary | ICD-10-CM

## 2024-12-03 DIAGNOSIS — N91.1 SECONDARY AMENORRHEA: ICD-10-CM

## 2024-12-03 DIAGNOSIS — E66.811 OBESITY (BMI 30.0-34.9): ICD-10-CM

## 2024-12-03 DIAGNOSIS — L70.0 ACNE VULGARIS: ICD-10-CM

## 2024-12-03 RX ORDER — MEDROXYPROGESTERONE ACETATE 5 MG
5 TABLET ORAL DAILY
Qty: 10 TABLET | Refills: 0 | Status: SHIPPED | OUTPATIENT
Start: 2024-12-03

## 2024-12-03 RX ORDER — CLINDAMYCIN PHOSPHATE 10 MG/G
GEL TOPICAL 2 TIMES DAILY
Qty: 30 G | Refills: 3 | Status: SHIPPED | OUTPATIENT
Start: 2024-12-03

## 2024-12-03 ASSESSMENT — PATIENT HEALTH QUESTIONNAIRE - PHQ9
1. LITTLE INTEREST OR PLEASURE IN DOING THINGS: NOT AT ALL
CLINICAL INTERPRETATION OF PHQ2 SCORE: NO FURTHER SCREENING NEEDED
SUM OF ALL RESPONSES TO PHQ9 QUESTIONS 1 AND 2: 0
2. FEELING DOWN, DEPRESSED OR HOPELESS: NOT AT ALL
SUM OF ALL RESPONSES TO PHQ9 QUESTIONS 1 AND 2: 0

## 2024-12-03 ASSESSMENT — PAIN SCALES - GENERAL: PAINLEVEL: 0

## 2024-12-04 LAB
C TRACH RRNA UR QL NAA+PROBE: NEGATIVE
Lab: NORMAL
N GONORRHOEA RRNA UR QL NAA+PROBE: NEGATIVE

## 2024-12-09 ENCOUNTER — LAB SERVICES (OUTPATIENT)
Dept: LAB | Age: 24
End: 2024-12-09

## 2024-12-09 DIAGNOSIS — Z11.3 SCREENING EXAMINATION FOR STD (SEXUALLY TRANSMITTED DISEASE): ICD-10-CM

## 2024-12-10 LAB
HCV AB SER QL: NEGATIVE
RPR SER QL: NONREACTIVE
T VAGINALIS RRNA UR QL NAA+PROBE: NEGATIVE

## 2024-12-11 ENCOUNTER — TELEPHONE (OUTPATIENT)
Dept: FAMILY MEDICINE | Age: 24
End: 2024-12-11

## 2024-12-12 ENCOUNTER — CASE MANAGEMENT (OUTPATIENT)
Dept: BEHAVIORAL HEALTH | Age: 24
End: 2024-12-12

## 2024-12-18 ENCOUNTER — TELEPHONE (OUTPATIENT)
Age: 24
End: 2024-12-18

## 2024-12-21 ENCOUNTER — HOSPITAL ENCOUNTER (EMERGENCY)
Age: 24
End: 2024-12-21
Attending: EMERGENCY MEDICINE

## 2024-12-21 ENCOUNTER — OFFICE VISIT (OUTPATIENT)
Dept: URGENT CARE | Age: 24
End: 2024-12-21

## 2024-12-21 VITALS
RESPIRATION RATE: 17 BRPM | HEART RATE: 61 BPM | DIASTOLIC BLOOD PRESSURE: 57 MMHG | TEMPERATURE: 98.1 F | HEIGHT: 71 IN | BODY MASS INDEX: 33.46 KG/M2 | WEIGHT: 239 LBS | OXYGEN SATURATION: 100 % | SYSTOLIC BLOOD PRESSURE: 125 MMHG

## 2024-12-21 VITALS
RESPIRATION RATE: 20 BRPM | SYSTOLIC BLOOD PRESSURE: 130 MMHG | OXYGEN SATURATION: 100 % | DIASTOLIC BLOOD PRESSURE: 71 MMHG | WEIGHT: 239.2 LBS | HEART RATE: 68 BPM | TEMPERATURE: 97.5 F | BODY MASS INDEX: 33.36 KG/M2

## 2024-12-21 DIAGNOSIS — F31.32 BIPOLAR 1 DISORDER, DEPRESSED, MODERATE  (CMD): ICD-10-CM

## 2024-12-21 DIAGNOSIS — Z76.0 MEDICATION REFILL: ICD-10-CM

## 2024-12-21 DIAGNOSIS — F31.9 BIPOLAR AFFECTIVE DISORDER, REMISSION STATUS UNSPECIFIED  (CMD): Primary | ICD-10-CM

## 2024-12-21 DIAGNOSIS — Z76.89 ENCOUNTER FOR MEDICATION ADMINISTRATION: ICD-10-CM

## 2024-12-21 PROCEDURE — 99283 EMERGENCY DEPT VISIT LOW MDM: CPT

## 2024-12-21 PROCEDURE — 99283 EMERGENCY DEPT VISIT LOW MDM: CPT | Performed by: EMERGENCY MEDICINE

## 2024-12-21 RX ORDER — DIVALPROEX SODIUM 125 MG/1
250 TABLET, DELAYED RELEASE ORAL ONCE
Status: COMPLETED | OUTPATIENT
Start: 2024-12-21 | End: 2024-12-22

## 2024-12-21 RX ORDER — DIVALPROEX SODIUM 250 MG/1
250 TABLET, FILM COATED, EXTENDED RELEASE ORAL DAILY
Qty: 30 TABLET | Refills: 1 | Status: SHIPPED | OUTPATIENT
Start: 2024-12-21

## 2024-12-21 ASSESSMENT — PAIN SCALES - GENERAL
PAINLEVEL_OUTOF10: 0
PAINLEVEL_OUTOF10: 9

## 2024-12-22 PROCEDURE — 10002803 HB RX 637: Performed by: EMERGENCY MEDICINE

## 2024-12-22 RX ADMIN — DIVALPROEX SODIUM 250 MG: 125 TABLET, DELAYED RELEASE ORAL at 01:15

## 2024-12-23 ENCOUNTER — APPOINTMENT (OUTPATIENT)
Age: 24
End: 2024-12-23

## 2024-12-23 ENCOUNTER — TELEPHONE (OUTPATIENT)
Age: 24
End: 2024-12-23

## 2024-12-23 ENCOUNTER — HOSPITAL ENCOUNTER (EMERGENCY)
Age: 24
Discharge: HOME | End: 2024-12-23

## 2024-12-23 VITALS
HEART RATE: 65 BPM | TEMPERATURE: 97.7 F | RESPIRATION RATE: 17 BRPM | DIASTOLIC BLOOD PRESSURE: 64 MMHG | SYSTOLIC BLOOD PRESSURE: 118 MMHG | OXYGEN SATURATION: 100 %

## 2024-12-23 DIAGNOSIS — F31.9 BIPOLAR 1 DISORDER  (CMD): Primary | ICD-10-CM

## 2024-12-23 DIAGNOSIS — Z76.89 ENCOUNTER FOR MEDICATION ADMINISTRATION: ICD-10-CM

## 2024-12-23 DIAGNOSIS — F17.290 VAPING NICOTINE DEPENDENCE, TOBACCO PRODUCT: ICD-10-CM

## 2024-12-23 DIAGNOSIS — F31.9 BIPOLAR AFFECTIVE DISORDER, REMISSION STATUS UNSPECIFIED  (CMD): Primary | ICD-10-CM

## 2024-12-23 PROCEDURE — 99282 EMERGENCY DEPT VISIT SF MDM: CPT

## 2024-12-23 PROCEDURE — 90792 PSYCH DIAG EVAL W/MED SRVCS: CPT | Performed by: PSYCHIATRY & NEUROLOGY

## 2024-12-23 PROCEDURE — 10005255 HB RX NO CHARGE PATIENT SUPPLIED MED COUNTER: Performed by: NURSE PRACTITIONER

## 2024-12-23 PROCEDURE — 96372 THER/PROPH/DIAG INJ SC/IM: CPT | Performed by: NURSE PRACTITIONER

## 2024-12-23 RX ORDER — DIVALPROEX SODIUM 250 MG/1
250 TABLET, DELAYED RELEASE ORAL 3 TIMES DAILY
Qty: 90 TABLET | Refills: 1 | Status: SHIPPED | OUTPATIENT
Start: 2024-12-23

## 2024-12-24 ENCOUNTER — E-ADVICE (OUTPATIENT)
Dept: BEHAVIORAL HEALTH | Age: 24
End: 2024-12-24

## 2025-01-12 ENCOUNTER — HEALTH MAINTENANCE LETTER (OUTPATIENT)
Age: 25
End: 2025-01-12

## 2025-01-16 ENCOUNTER — TELEPHONE (OUTPATIENT)
Dept: FAMILY MEDICINE | Age: 25
End: 2025-01-16

## 2025-01-16 ENCOUNTER — APPOINTMENT (OUTPATIENT)
Dept: FAMILY MEDICINE | Age: 25
End: 2025-01-16

## 2025-01-28 ENCOUNTER — HOSPITAL ENCOUNTER (EMERGENCY)
Age: 25
Discharge: STILL ADMITTED OR ADMINISTRATIVE | End: 2025-01-28

## 2025-02-13 ENCOUNTER — APPOINTMENT (OUTPATIENT)
Dept: FAMILY MEDICINE | Age: 25
End: 2025-02-13

## 2025-02-13 VITALS
BODY MASS INDEX: 33.49 KG/M2 | SYSTOLIC BLOOD PRESSURE: 105 MMHG | OXYGEN SATURATION: 100 % | RESPIRATION RATE: 18 BRPM | HEART RATE: 67 BPM | WEIGHT: 239.2 LBS | DIASTOLIC BLOOD PRESSURE: 66 MMHG | TEMPERATURE: 97.5 F | HEIGHT: 71 IN

## 2025-02-13 DIAGNOSIS — L21.0 DANDRUFF IN ADULT: ICD-10-CM

## 2025-02-13 DIAGNOSIS — Z13.5 GLAUCOMA SCREENING: ICD-10-CM

## 2025-02-13 DIAGNOSIS — Z13.1 SCREENING FOR DIABETES MELLITUS (DM): ICD-10-CM

## 2025-02-13 DIAGNOSIS — F31.32 BIPOLAR 1 DISORDER, DEPRESSED, MODERATE  (CMD): ICD-10-CM

## 2025-02-13 DIAGNOSIS — Z00.00 MEDICARE ANNUAL WELLNESS VISIT, INITIAL: Primary | ICD-10-CM

## 2025-02-13 DIAGNOSIS — Z01.419 VISIT FOR GYNECOLOGIC EXAMINATION: ICD-10-CM

## 2025-02-13 DIAGNOSIS — Z01.00 ENCOUNTER FOR VISION SCREENING: ICD-10-CM

## 2025-02-13 DIAGNOSIS — E66.9 OBESITY (BMI 30-39.9): ICD-10-CM

## 2025-02-13 DIAGNOSIS — H61.21 IMPACTED CERUMEN OF RIGHT EAR: ICD-10-CM

## 2025-02-13 DIAGNOSIS — L70.0 ACNE VULGARIS: ICD-10-CM

## 2025-02-13 DIAGNOSIS — Z71.89 ADVANCED DIRECTIVES, COUNSELING/DISCUSSION: ICD-10-CM

## 2025-02-13 DIAGNOSIS — Z11.3 SCREEN FOR STD (SEXUALLY TRANSMITTED DISEASE): ICD-10-CM

## 2025-02-13 DIAGNOSIS — Z11.59 NEED FOR HEPATITIS C SCREENING TEST: ICD-10-CM

## 2025-02-13 RX ORDER — PALIPERIDONE PALMITATE 156 MG/ML
156 INJECTION INTRAMUSCULAR
Status: SHIPPED | COMMUNITY
Start: 2025-02-13

## 2025-02-13 RX ORDER — KETOCONAZOLE 20 MG/ML
SHAMPOO, SUSPENSION TOPICAL PRN
Qty: 120 ML | Refills: 1 | Status: SHIPPED | OUTPATIENT
Start: 2025-02-13

## 2025-02-13 RX ORDER — CLINDAMYCIN PHOSPHATE 10 MG/G
GEL TOPICAL 2 TIMES DAILY
Qty: 30 G | Refills: 3 | Status: SHIPPED | OUTPATIENT
Start: 2025-02-13

## 2025-02-13 ASSESSMENT — ENCOUNTER SYMPTOMS
TROUBLE SWALLOWING: 0
FATIGUE: 0
NAUSEA: 0
CONFUSION: 0
ABDOMINAL PAIN: 0
UNEXPECTED WEIGHT CHANGE: 0
SLEEP DISTURBANCE: 0
COLOR CHANGE: 0
BLOOD IN STOOL: 0
APPETITE CHANGE: 0
FEVER: 0
SHORTNESS OF BREATH: 0
DIARRHEA: 0
AGITATION: 0
WEAKNESS: 0
DIZZINESS: 0
VOMITING: 0
BACK PAIN: 0
COUGH: 0
POLYPHAGIA: 0
TREMORS: 0
CONSTIPATION: 0
BRUISES/BLEEDS EASILY: 0
ANAL BLEEDING: 0
HEADACHES: 0
EYE DISCHARGE: 0
NERVOUS/ANXIOUS: 0
SINUS PAIN: 0
SORE THROAT: 0
LIGHT-HEADEDNESS: 0
CHILLS: 0
WHEEZING: 0
POLYDIPSIA: 0
DIAPHORESIS: 0
ACTIVITY CHANGE: 0
RHINORRHEA: 0

## 2025-02-13 ASSESSMENT — ANXIETY QUESTIONNAIRES
3. WORRYING TOO MUCH ABOUT DIFFERENT THINGS: 1
2. NOT BEING ABLE TO STOP OR CONTROL WORRYING: 1
GAD7 TOTAL SCORE: 5
4. TROUBLE RELAXING: NOT AT ALL
6. BECOMING EASILY ANNOYED OR IRRITABLE: 1
5. BEING SO RESTLESS THAT IT IS HARD TO SIT STILL: 0
4. TROUBLE RELAXING: 0
1. FEELING NERVOUS, ANXIOUS, OR ON EDGE: 2
1. FEELING NERVOUS, ANXIOUS, OR ON EDGE: MORE THAN HALF THE DAYS
IF YOU CHECKED OFF ANY PROBLEMS ON THIS QUESTIONNAIRE, HOW DIFFICULT HAVE THESE PROBLEMS MADE IT FOR YOU TO DO YOUR WORK, TAKE CARE OF THINGS AT HOME, OR GET ALONG WITH OTHER PEOPLE: NOT DIFFICULT AT ALL
2. NOT BEING ABLE TO STOP OR CONTROL WORRYING: SEVERAL DAYS
5. BEING SO RESTLESS THAT IT IS HARD TO SIT STILL: NOT AT ALL
3. WORRYING TOO MUCH ABOUT DIFFERENT THINGS: SEVERAL DAYS
6. BECOMING EASILY ANNOYED OR IRRITABLE: SEVERAL DAYS
7. FEELING AFRAID AS IF SOMETHING AWFUL MIGHT HAPPEN: 0
7. FEELING AFRAID AS IF SOMETHING AWFUL MIGHT HAPPEN: NOT AT ALL

## 2025-02-13 ASSESSMENT — PATIENT HEALTH QUESTIONNAIRE - PHQ9
9. THOUGHTS THAT YOU WOULD BE BETTER OFF DEAD, OR OF HURTING YOURSELF: NOT AT ALL
CLINICAL INTERPRETATION OF PHQ2 SCORE: NO FURTHER SCREENING NEEDED
2. FEELING DOWN, DEPRESSED OR HOPELESS: NOT AT ALL
SUM OF ALL RESPONSES TO PHQ9 QUESTIONS 1 AND 2: 0
3. TROUBLE FALLING OR STAYING ASLEEP OR SLEEPING TOO MUCH: NOT AT ALL
1. LITTLE INTEREST OR PLEASURE IN DOING THINGS: NOT AT ALL
8. MOVING OR SPEAKING SO SLOWLY THAT OTHER PEOPLE COULD HAVE NOTICED. OR THE OPPOSITE, BEING SO FIGETY OR RESTLESS THAT YOU HAVE BEEN MOVING AROUND A LOT MORE THAN USUAL: NOT AT ALL
10. IF YOU CHECKED OFF ANY PROBLEMS, HOW DIFFICULT HAVE THESE PROBLEMS MADE IT FOR YOU TO DO YOUR WORK, TAKE CARE OF THINGS AT HOME, OR GET ALONG WITH OTHER PEOPLE: NOT DIFFICULT AT ALL
7. TROUBLE CONCENTRATING ON THINGS, SUCH AS READING THE NEWSPAPER OR WATCHING TELEVISION: NOT AT ALL
SUM OF ALL RESPONSES TO PHQ QUESTIONS 1-9: 0
SUM OF ALL RESPONSES TO PHQ9 QUESTIONS 1 AND 2: 0
5. POOR APPETITE, WEIGHT LOSS, OR OVEREATING: NOT AT ALL
4. FEELING TIRED OR HAVING LITTLE ENERGY: NOT AT ALL
6. FEELING BAD ABOUT YOURSELF - OR THAT YOU ARE A FAILURE OR HAVE LET YOURSELF OR YOUR FAMILY DOWN: NOT AT ALL

## 2025-02-13 ASSESSMENT — PAIN SCALES - GENERAL: PAINLEVEL: 0

## 2025-02-17 ENCOUNTER — TELEPHONE (OUTPATIENT)
Dept: FAMILY MEDICINE | Age: 25
End: 2025-02-17

## 2025-02-27 ENCOUNTER — APPOINTMENT (OUTPATIENT)
Dept: INTERNAL MEDICINE | Age: 25
End: 2025-02-27

## 2025-02-28 ENCOUNTER — APPOINTMENT (OUTPATIENT)
Dept: INTERNAL MEDICINE | Age: 25
End: 2025-02-28

## 2025-02-28 ENCOUNTER — HOSPITAL ENCOUNTER (EMERGENCY)
Age: 25
Discharge: HOME | End: 2025-02-28

## 2025-02-28 VITALS
HEART RATE: 92 BPM | DIASTOLIC BLOOD PRESSURE: 71 MMHG | HEIGHT: 71 IN | TEMPERATURE: 97.9 F | RESPIRATION RATE: 18 BRPM | SYSTOLIC BLOOD PRESSURE: 113 MMHG | BODY MASS INDEX: 33.18 KG/M2 | WEIGHT: 237 LBS | OXYGEN SATURATION: 99 %

## 2025-02-28 DIAGNOSIS — F31.9 BIPOLAR AFFECTIVE DISORDER, REMISSION STATUS UNSPECIFIED  (CMD): ICD-10-CM

## 2025-02-28 DIAGNOSIS — Z76.89 ENCOUNTER FOR MEDICATION ADMINISTRATION: Primary | ICD-10-CM

## 2025-02-28 PROCEDURE — 99283 EMERGENCY DEPT VISIT LOW MDM: CPT

## 2025-02-28 PROCEDURE — 99282 EMERGENCY DEPT VISIT SF MDM: CPT

## 2025-03-06 ENCOUNTER — TELEPHONE (OUTPATIENT)
Dept: CHRONIC DISEASE MANAGEMENT | Age: 25
End: 2025-03-06

## 2025-03-10 ENCOUNTER — APPOINTMENT (OUTPATIENT)
Age: 25
End: 2025-03-10

## 2025-03-10 DIAGNOSIS — F17.290 VAPING NICOTINE DEPENDENCE, TOBACCO PRODUCT: Primary | ICD-10-CM

## 2025-03-10 DIAGNOSIS — F10.91 ALCOHOL USE DISORDER IN REMISSION: ICD-10-CM

## 2025-03-10 DIAGNOSIS — F31.32 BIPOLAR 1 DISORDER, DEPRESSED, MODERATE  (CMD): ICD-10-CM

## 2025-03-10 DIAGNOSIS — F12.91 CANNABIS USE DISORDER IN REMISSION: ICD-10-CM

## 2025-03-10 PROCEDURE — 99214 OFFICE O/P EST MOD 30 MIN: CPT | Performed by: PSYCHIATRY & NEUROLOGY

## 2025-03-10 RX ORDER — PALIPERIDONE PALMITATE 156 MG/ML
156 INJECTION INTRAMUSCULAR
Qty: 1 EACH | Refills: 1 | Status: SHIPPED | OUTPATIENT
Start: 2025-03-10

## 2025-03-10 RX ORDER — DIVALPROEX SODIUM 250 MG/1
250 TABLET, FILM COATED, EXTENDED RELEASE ORAL DAILY
Qty: 30 TABLET | Refills: 1 | Status: SHIPPED | OUTPATIENT
Start: 2025-03-10

## 2025-03-12 ENCOUNTER — APPOINTMENT (OUTPATIENT)
Dept: CHRONIC DISEASE MANAGEMENT | Age: 25
End: 2025-03-12

## 2025-03-12 VITALS — BODY MASS INDEX: 32.75 KG/M2 | WEIGHT: 234.79 LBS

## 2025-03-12 DIAGNOSIS — E66.811 CLASS 1 OBESITY WITH BODY MASS INDEX (BMI) OF 32.0 TO 32.9 IN ADULT: Primary | ICD-10-CM

## 2025-03-12 PROCEDURE — G0447 BEHAVIOR COUNSEL OBESITY 15M: HCPCS

## 2025-03-27 ENCOUNTER — TELEPHONE (OUTPATIENT)
Dept: FAMILY MEDICINE | Age: 25
End: 2025-03-27

## 2025-03-28 ENCOUNTER — HOSPITAL ENCOUNTER (EMERGENCY)
Age: 25
Discharge: STILL ADMITTED OR ADMINISTRATIVE | End: 2025-03-28

## 2025-03-28 PROCEDURE — 10003627 HB COUNTER ED NO SERVICE

## 2025-04-08 ENCOUNTER — E-ADVICE (OUTPATIENT)
Age: 25
End: 2025-04-08

## 2025-04-08 RX ORDER — DIVALPROEX SODIUM 500 MG/1
500 TABLET, FILM COATED, EXTENDED RELEASE ORAL DAILY
Qty: 30 TABLET | Refills: 0 | Status: SHIPPED | OUTPATIENT
Start: 2025-04-08 | End: 2025-04-08 | Stop reason: DRUGHIGH

## 2025-04-08 RX ORDER — DIVALPROEX SODIUM 500 MG/1
500 TABLET, FILM COATED, EXTENDED RELEASE ORAL NIGHTLY
Qty: 30 TABLET | Refills: 1 | Status: SHIPPED | OUTPATIENT
Start: 2025-04-08 | End: 2025-04-10 | Stop reason: ALTCHOICE

## 2025-04-10 ENCOUNTER — ALCOHOL/DRUG ABUSE (OUTPATIENT)
Age: 25
End: 2025-04-10

## 2025-04-10 DIAGNOSIS — F31.32 BIPOLAR 1 DISORDER, DEPRESSED, MODERATE  (CMD): Primary | ICD-10-CM

## 2025-04-10 DIAGNOSIS — F17.290 VAPING NICOTINE DEPENDENCE, TOBACCO PRODUCT: ICD-10-CM

## 2025-04-10 RX ORDER — LAMOTRIGINE 25 MG/1
TABLET ORAL
Qty: 45 TABLET | Refills: 0 | Status: SHIPPED | OUTPATIENT
Start: 2025-04-10

## 2025-04-10 RX ORDER — PALIPERIDONE PALMITATE 156 MG/ML
156 INJECTION INTRAMUSCULAR
Qty: 1 EACH | Refills: 1 | Status: SHIPPED | OUTPATIENT
Start: 2025-04-10

## 2025-04-28 ENCOUNTER — APPOINTMENT (OUTPATIENT)
Dept: OBGYN | Age: 25
End: 2025-04-28

## 2025-04-28 ENCOUNTER — NURSE ONLY (OUTPATIENT)
Dept: INTERNAL MEDICINE | Age: 25
End: 2025-04-28

## 2025-04-28 VITALS
BODY MASS INDEX: 31.74 KG/M2 | HEIGHT: 71 IN | DIASTOLIC BLOOD PRESSURE: 78 MMHG | HEART RATE: 61 BPM | SYSTOLIC BLOOD PRESSURE: 121 MMHG | WEIGHT: 226.74 LBS | OXYGEN SATURATION: 98 %

## 2025-04-28 DIAGNOSIS — Z01.419 ENCOUNTER FOR GYNECOLOGICAL EXAMINATION WITHOUT ABNORMAL FINDING: Primary | ICD-10-CM

## 2025-04-28 DIAGNOSIS — Z12.4 CERVICAL CANCER SCREENING: ICD-10-CM

## 2025-04-28 DIAGNOSIS — F31.32 BIPOLAR 1 DISORDER, DEPRESSED, MODERATE  (CMD): Primary | ICD-10-CM

## 2025-04-28 PROCEDURE — 96372 THER/PROPH/DIAG INJ SC/IM: CPT | Performed by: GENERAL PRACTICE

## 2025-04-28 PROCEDURE — 99385 PREV VISIT NEW AGE 18-39: CPT | Performed by: OBSTETRICS & GYNECOLOGY

## 2025-04-28 ASSESSMENT — ENCOUNTER SYMPTOMS
BRUISES/BLEEDS EASILY: 0
ALLERGIC/IMMUNOLOGIC NEGATIVE: 1
EYES NEGATIVE: 1
RESPIRATORY NEGATIVE: 1
DIZZINESS: 0
PSYCHIATRIC NEGATIVE: 1
SHORTNESS OF BREATH: 0
HEMATOLOGIC/LYMPHATIC NEGATIVE: 1
GASTROINTESTINAL NEGATIVE: 1
CONSTITUTIONAL NEGATIVE: 1

## 2025-04-28 ASSESSMENT — PAIN SCALES - GENERAL: PAINLEVEL_OUTOF10: 0

## 2025-04-30 LAB — HOLD SPECIMEN: NORMAL

## 2025-05-04 LAB
CASE RPRT: NORMAL
CYTOLOGY CVX/VAG STUDY: NORMAL
PAP EDUCATIONAL NOTE: NORMAL
STAT OF ADQ CVX/VAG CYTO-IMP: NORMAL

## 2025-05-05 ENCOUNTER — E-ADVICE (OUTPATIENT)
Dept: BEHAVIORAL HEALTH | Age: 25
End: 2025-05-05

## 2025-05-05 ENCOUNTER — E-ADVICE (OUTPATIENT)
Age: 25
End: 2025-05-05

## 2025-05-05 RX ORDER — LAMOTRIGINE 25 MG/1
TABLET ORAL
Qty: 45 TABLET | Refills: 0 | Status: CANCELLED | OUTPATIENT
Start: 2025-05-05

## 2025-05-05 RX ORDER — LAMOTRIGINE 25 MG/1
TABLET ORAL
Qty: 60 TABLET | Refills: 0 | Status: SHIPPED | OUTPATIENT
Start: 2025-05-05

## 2025-05-06 ENCOUNTER — APPOINTMENT (OUTPATIENT)
Dept: LAB | Age: 25
End: 2025-05-06

## 2025-05-06 DIAGNOSIS — Z11.3 SCREEN FOR STD (SEXUALLY TRANSMITTED DISEASE): ICD-10-CM

## 2025-05-06 DIAGNOSIS — Z13.1 SCREENING FOR DIABETES MELLITUS (DM): ICD-10-CM

## 2025-05-06 DIAGNOSIS — Z11.59 NEED FOR HEPATITIS C SCREENING TEST: ICD-10-CM

## 2025-05-06 DIAGNOSIS — Z00.00 MEDICARE ANNUAL WELLNESS VISIT, INITIAL: ICD-10-CM

## 2025-05-07 ENCOUNTER — RESULTS FOLLOW-UP (OUTPATIENT)
Dept: OBGYN | Age: 25
End: 2025-05-07

## 2025-05-07 LAB
25(OH)D3+25(OH)D2 SERPL-MCNC: 45.3 NG/ML (ref 30–100)
ALBUMIN SERPL-MCNC: 4 G/DL (ref 3.4–5)
ALBUMIN/GLOB SERPL: 1.1 {RATIO} (ref 1–2.4)
ALP SERPL-CCNC: 95 UNITS/L (ref 45–117)
ALT SERPL-CCNC: 16 UNITS/L
ANION GAP SERPL CALC-SCNC: 8 MMOL/L (ref 7–19)
ANNOTATION COMMENT IMP: NORMAL
APPEARANCE UR: CLEAR
AST SERPL-CCNC: 18 UNITS/L
BASOPHILS # BLD: 0 K/MCL (ref 0–0.3)
BASOPHILS NFR BLD: 0 %
BILIRUB SERPL-MCNC: 0.5 MG/DL (ref 0.2–1)
BILIRUB UR QL STRIP: NEGATIVE
BUN SERPL-MCNC: 12 MG/DL (ref 6–20)
BUN/CREAT SERPL: 15 (ref 7–25)
CALCIUM SERPL-MCNC: 9.9 MG/DL (ref 8.4–10.2)
CHLORIDE SERPL-SCNC: 107 MMOL/L (ref 97–110)
CHOLEST SERPL-MCNC: 194 MG/DL
CHOLEST/HDLC SERPL: 3.4 {RATIO}
CO2 SERPL-SCNC: 26 MMOL/L (ref 21–32)
COLOR UR: YELLOW
CREAT SERPL-MCNC: 0.78 MG/DL (ref 0.51–0.95)
DEPRECATED RDW RBC: 45.2 FL (ref 39–50)
EGFRCR SERPLBLD CKD-EPI 2021: >90 ML/MIN/{1.73_M2}
EOSINOPHIL # BLD: 0.1 K/MCL (ref 0–0.5)
EOSINOPHIL NFR BLD: 2 %
ERYTHROCYTE [DISTWIDTH] IN BLOOD: 14.5 % (ref 11–15)
FASTING DURATION TIME PATIENT: NORMAL H
GLOBULIN SER-MCNC: 3.7 G/DL (ref 2–4)
GLUCOSE SERPL-MCNC: 89 MG/DL (ref 70–99)
GLUCOSE UR STRIP-MCNC: NEGATIVE MG/DL
HBA1C MFR BLD: 5.4 % (ref 4.5–5.6)
HBV CORE IGG+IGM SER QL: NEGATIVE
HBV SURFACE AB SER QL: POSITIVE
HBV SURFACE AG SER QL: NEGATIVE
HCT VFR BLD CALC: 38.3 % (ref 36–46.5)
HCV AB SER QL: NEGATIVE
HDLC SERPL-MCNC: 57 MG/DL
HGB BLD-MCNC: 12.3 G/DL (ref 12–15.5)
HGB UR QL STRIP: NEGATIVE
HIV 1+2 AB+HIV1 P24 AG SERPL QL IA: NONREACTIVE
IMM GRANULOCYTES # BLD AUTO: 0 K/MCL (ref 0–0.2)
IMM GRANULOCYTES # BLD: 0 %
KETONES UR STRIP-MCNC: NEGATIVE MG/DL
LDLC SERPL CALC-MCNC: 126 MG/DL
LEUKOCYTE ESTERASE UR QL STRIP: NEGATIVE
LYMPHOCYTES # BLD: 2.3 K/MCL (ref 1–4.8)
LYMPHOCYTES NFR BLD: 34 %
MCH RBC QN AUTO: 27.7 PG (ref 26–34)
MCHC RBC AUTO-ENTMCNC: 32.1 G/DL (ref 32–36.5)
MCV RBC AUTO: 86.3 FL (ref 78–100)
MONOCYTES # BLD: 0.5 K/MCL (ref 0.3–0.9)
MONOCYTES NFR BLD: 7 %
NEUTROPHILS # BLD: 3.8 K/MCL (ref 1.8–7.7)
NEUTROPHILS NFR BLD: 57 %
NITRITE UR QL STRIP: NEGATIVE
NONHDLC SERPL-MCNC: 137 MG/DL
NRBC BLD MANUAL-RTO: 0 /100 WBC
PH UR STRIP: 6 [PH] (ref 5–7)
PLATELET # BLD AUTO: 291 K/MCL (ref 140–450)
POTASSIUM SERPL-SCNC: 4.3 MMOL/L (ref 3.4–5.1)
PROT SERPL-MCNC: 7.7 G/DL (ref 6.4–8.2)
PROT UR STRIP-MCNC: NEGATIVE MG/DL
RBC # BLD: 4.44 MIL/MCL (ref 4–5.2)
SODIUM SERPL-SCNC: 137 MMOL/L (ref 135–145)
SP GR UR STRIP: 1.03 (ref 1–1.03)
TRIGL SERPL-MCNC: 56 MG/DL
TSH SERPL-ACNC: 0.85 MCUNITS/ML (ref 0.35–5)
UROBILINOGEN UR STRIP-MCNC: 0.2 MG/DL
WBC # BLD: 6.7 K/MCL (ref 4.2–11)

## 2025-05-08 ENCOUNTER — APPOINTMENT (OUTPATIENT)
Dept: OTOLARYNGOLOGY | Age: 25
End: 2025-05-08
Attending: GENERAL PRACTICE

## 2025-05-08 VITALS
HEART RATE: 75 BPM | RESPIRATION RATE: 16 BRPM | WEIGHT: 220.02 LBS | SYSTOLIC BLOOD PRESSURE: 120 MMHG | DIASTOLIC BLOOD PRESSURE: 72 MMHG | BODY MASS INDEX: 30.69 KG/M2 | OXYGEN SATURATION: 100 %

## 2025-05-08 DIAGNOSIS — J31.0 RHINITIS, CHRONIC: Primary | ICD-10-CM

## 2025-05-08 PROBLEM — H61.21 IMPACTED CERUMEN OF RIGHT EAR: Status: RESOLVED | Noted: 2025-02-13 | Resolved: 2025-05-08

## 2025-05-08 RX ORDER — FLUTICASONE PROPIONATE 50 MCG
2 SPRAY, SUSPENSION (ML) NASAL DAILY
Qty: 1 EACH | Refills: 6 | Status: SHIPPED | OUTPATIENT
Start: 2025-05-08 | End: 2025-06-07

## 2025-05-11 ENCOUNTER — RESULTS FOLLOW-UP (OUTPATIENT)
Dept: FAMILY MEDICINE | Age: 25
End: 2025-05-11

## 2025-05-12 ENCOUNTER — APPOINTMENT (OUTPATIENT)
Age: 25
End: 2025-05-12

## 2025-05-12 DIAGNOSIS — F12.91 CANNABIS USE DISORDER IN REMISSION: ICD-10-CM

## 2025-05-12 DIAGNOSIS — F10.91 ALCOHOL USE DISORDER IN REMISSION: ICD-10-CM

## 2025-05-12 DIAGNOSIS — F17.290 VAPING NICOTINE DEPENDENCE, TOBACCO PRODUCT: ICD-10-CM

## 2025-05-12 DIAGNOSIS — F31.9 BIPOLAR 1 DISORDER  (CMD): Primary | ICD-10-CM

## 2025-05-12 PROCEDURE — 99214 OFFICE O/P EST MOD 30 MIN: CPT | Performed by: PSYCHIATRY & NEUROLOGY

## 2025-05-12 RX ORDER — LAMOTRIGINE 25 MG/1
TABLET ORAL
Qty: 60 TABLET | Refills: 1 | Status: SHIPPED | OUTPATIENT
Start: 2025-05-12

## 2025-05-12 RX ORDER — PALIPERIDONE PALMITATE 156 MG/ML
156 INJECTION INTRAMUSCULAR
Qty: 1 EACH | Refills: 1 | Status: SHIPPED | OUTPATIENT
Start: 2025-05-12

## 2025-05-13 ENCOUNTER — APPOINTMENT (OUTPATIENT)
Dept: FAMILY MEDICINE | Age: 25
End: 2025-05-13

## 2025-05-13 VITALS
SYSTOLIC BLOOD PRESSURE: 98 MMHG | WEIGHT: 223.77 LBS | TEMPERATURE: 97.6 F | RESPIRATION RATE: 18 BRPM | HEIGHT: 71 IN | BODY MASS INDEX: 31.33 KG/M2 | DIASTOLIC BLOOD PRESSURE: 65 MMHG | OXYGEN SATURATION: 97 % | HEART RATE: 57 BPM

## 2025-05-13 DIAGNOSIS — L81.9 PIGMENTED SKIN LESION: ICD-10-CM

## 2025-05-13 DIAGNOSIS — F31.11 BIPOLAR AFFECTIVE DISORDER, CURRENTLY MANIC, MILD  (CMD): ICD-10-CM

## 2025-05-13 DIAGNOSIS — I49.1 PREMATURE ATRIAL COMPLEX: ICD-10-CM

## 2025-05-13 DIAGNOSIS — E66.9 OBESITY (BMI 30-39.9): ICD-10-CM

## 2025-05-13 DIAGNOSIS — R00.1 BRADYCARDIA: Primary | ICD-10-CM

## 2025-05-13 PROCEDURE — 99214 OFFICE O/P EST MOD 30 MIN: CPT | Performed by: GENERAL PRACTICE

## 2025-05-13 PROCEDURE — 93000 ELECTROCARDIOGRAM COMPLETE: CPT | Performed by: GENERAL PRACTICE

## 2025-05-13 ASSESSMENT — ENCOUNTER SYMPTOMS
DIARRHEA: 0
DIAPHORESIS: 0
SORE THROAT: 0
ANAL BLEEDING: 0
ABDOMINAL PAIN: 0
APPETITE CHANGE: 0
SHORTNESS OF BREATH: 0
SINUS PAIN: 0
BACK PAIN: 0
COLOR CHANGE: 1
WHEEZING: 0
UNEXPECTED WEIGHT CHANGE: 0
POLYDIPSIA: 0
BLOOD IN STOOL: 0
TROUBLE SWALLOWING: 0
CONFUSION: 0
FATIGUE: 0
DIZZINESS: 0
LIGHT-HEADEDNESS: 0
VOMITING: 0
POLYPHAGIA: 0
TREMORS: 0
WEAKNESS: 0
NAUSEA: 0
RHINORRHEA: 0
CHILLS: 0
CONSTIPATION: 0
COUGH: 0
NERVOUS/ANXIOUS: 0
ACTIVITY CHANGE: 0
SLEEP DISTURBANCE: 0
AGITATION: 0
EYE DISCHARGE: 0
BRUISES/BLEEDS EASILY: 0
HEADACHES: 0
FEVER: 0

## 2025-05-13 ASSESSMENT — PAIN SCALES - GENERAL: PAINLEVEL_OUTOF10: 0

## 2025-05-13 ASSESSMENT — PATIENT HEALTH QUESTIONNAIRE - PHQ9
SUM OF ALL RESPONSES TO PHQ9 QUESTIONS 1 AND 2: 0
CLINICAL INTERPRETATION OF PHQ2 SCORE: NO FURTHER SCREENING NEEDED
2. FEELING DOWN, DEPRESSED OR HOPELESS: NOT AT ALL
SUM OF ALL RESPONSES TO PHQ9 QUESTIONS 1 AND 2: 0
1. LITTLE INTEREST OR PLEASURE IN DOING THINGS: NOT AT ALL

## 2025-05-20 ENCOUNTER — APPOINTMENT (OUTPATIENT)
Age: 25
End: 2025-05-20

## 2025-05-20 DIAGNOSIS — F12.91 CANNABIS USE DISORDER IN REMISSION: ICD-10-CM

## 2025-05-20 DIAGNOSIS — F10.91 ALCOHOL USE DISORDER IN REMISSION: ICD-10-CM

## 2025-05-20 DIAGNOSIS — F31.9 BIPOLAR I DISORDER  (CMD): Primary | ICD-10-CM

## 2025-05-20 PROCEDURE — 90791 PSYCH DIAGNOSTIC EVALUATION: CPT | Performed by: COUNSELOR

## 2025-05-21 ENCOUNTER — E-ADVICE (OUTPATIENT)
Age: 25
End: 2025-05-21

## 2025-05-22 ENCOUNTER — TELEPHONE (OUTPATIENT)
Dept: CARDIOLOGY | Age: 25
End: 2025-05-22

## 2025-05-28 ENCOUNTER — APPOINTMENT (OUTPATIENT)
Dept: INTERNAL MEDICINE | Age: 25
End: 2025-05-28

## 2025-05-28 VITALS — WEIGHT: 221.34 LBS | BODY MASS INDEX: 30.87 KG/M2

## 2025-05-28 DIAGNOSIS — F31.11 BIPOLAR AFFECTIVE DISORDER, CURRENTLY MANIC, MILD  (CMD): Primary | ICD-10-CM

## 2025-06-03 ENCOUNTER — TELEPHONE (OUTPATIENT)
Age: 25
End: 2025-06-03

## 2025-06-03 ENCOUNTER — APPOINTMENT (OUTPATIENT)
Age: 25
End: 2025-06-03

## 2025-06-06 ENCOUNTER — TELEPHONE (OUTPATIENT)
Dept: CARDIOLOGY | Age: 25
End: 2025-06-06

## 2025-06-10 ENCOUNTER — TELEPHONE (OUTPATIENT)
Dept: BEHAVIORAL HEALTH | Age: 25
End: 2025-06-10

## 2025-06-26 ENCOUNTER — APPOINTMENT (OUTPATIENT)
Dept: BEHAVIORAL HEALTH | Age: 25
End: 2025-06-26
Attending: PSYCHIATRY & NEUROLOGY

## 2025-06-28 ENCOUNTER — APPOINTMENT (OUTPATIENT)
Dept: INTERNAL MEDICINE | Age: 25
End: 2025-06-28

## 2025-06-28 DIAGNOSIS — F31.11 BIPOLAR AFFECTIVE DISORDER, CURRENTLY MANIC, MILD  (CMD): ICD-10-CM

## 2025-06-28 DIAGNOSIS — F31.32 BIPOLAR 1 DISORDER, DEPRESSED, MODERATE  (CMD): Primary | ICD-10-CM

## 2025-07-09 ENCOUNTER — APPOINTMENT (OUTPATIENT)
Age: 25
End: 2025-07-09

## 2025-07-09 DIAGNOSIS — F12.91 CANNABIS USE DISORDER IN REMISSION: ICD-10-CM

## 2025-07-09 DIAGNOSIS — F17.290 VAPING NICOTINE DEPENDENCE, TOBACCO PRODUCT: ICD-10-CM

## 2025-07-09 DIAGNOSIS — F10.91 ALCOHOL USE DISORDER IN REMISSION: ICD-10-CM

## 2025-07-09 DIAGNOSIS — F31.9 BIPOLAR 1 DISORDER  (CMD): Primary | ICD-10-CM

## 2025-07-09 RX ORDER — PALIPERIDONE PALMITATE 156 MG/ML
156 INJECTION INTRAMUSCULAR
Qty: 1 EACH | Refills: 1 | Status: SHIPPED | OUTPATIENT
Start: 2025-07-09

## 2025-07-09 RX ORDER — LAMOTRIGINE 25 MG/1
TABLET ORAL
Qty: 60 TABLET | Refills: 1 | Status: SHIPPED | OUTPATIENT
Start: 2025-07-09

## 2025-07-16 ENCOUNTER — APPOINTMENT (OUTPATIENT)
Dept: CARDIOLOGY | Age: 25
End: 2025-07-16
Attending: GENERAL PRACTICE

## 2025-07-17 ENCOUNTER — TELEPHONE (OUTPATIENT)
Dept: FAMILY MEDICINE | Age: 25
End: 2025-07-17

## 2025-07-22 ENCOUNTER — APPOINTMENT (OUTPATIENT)
Dept: FAMILY MEDICINE | Age: 25
End: 2025-07-22

## 2025-07-28 ENCOUNTER — APPOINTMENT (OUTPATIENT)
Dept: INTERNAL MEDICINE | Age: 25
End: 2025-07-28

## 2025-07-29 ENCOUNTER — NURSE ONLY (OUTPATIENT)
Dept: INTERNAL MEDICINE | Age: 25
End: 2025-07-29

## 2025-07-29 DIAGNOSIS — N89.8 VAGINAL ITCHING: Primary | ICD-10-CM

## 2025-07-29 DIAGNOSIS — N89.8 VAGINAL ITCHING: ICD-10-CM

## 2025-07-30 LAB
BACTERIAL VAGINOSIS VAG-IMP: NOT DETECTED
C ALBICANS DNA VAG QL NAA+PROBE: POSITIVE
C GLABRATA DNA VAG QL NAA+PROBE: NOT DETECTED
SERVICE CMNT-IMP: ABNORMAL
SERVICE CMNT-IMP: NORMAL
T VAGINALIS DNA VAG QL NAA+PROBE: NOT DETECTED

## 2025-07-31 ENCOUNTER — TELEPHONE (OUTPATIENT)
Dept: OBGYN | Age: 25
End: 2025-07-31

## 2025-07-31 DIAGNOSIS — B37.31 VAGINAL YEAST INFECTION: Primary | ICD-10-CM

## 2025-07-31 RX ORDER — FLUCONAZOLE 150 MG/1
150 TABLET ORAL
Qty: 2 TABLET | Refills: 0 | Status: SHIPPED | OUTPATIENT
Start: 2025-07-31

## 2025-08-19 ENCOUNTER — V-VISIT (OUTPATIENT)
Dept: FAMILY MEDICINE | Age: 25
End: 2025-08-19

## 2025-08-19 VITALS
DIASTOLIC BLOOD PRESSURE: 66 MMHG | HEIGHT: 71 IN | SYSTOLIC BLOOD PRESSURE: 109 MMHG | HEART RATE: 63 BPM | WEIGHT: 206.13 LBS | RESPIRATION RATE: 18 BRPM | OXYGEN SATURATION: 99 % | TEMPERATURE: 98.7 F | BODY MASS INDEX: 28.86 KG/M2

## 2025-08-19 DIAGNOSIS — J02.9 ACUTE VIRAL PHARYNGITIS: Primary | ICD-10-CM

## 2025-08-19 DIAGNOSIS — J02.9 SORE THROAT: ICD-10-CM

## 2025-08-19 LAB
INTERNAL PROCEDURAL CONTROLS ACCEPTABLE: YES
S PYO AG THROAT QL IA.RAPID: NEGATIVE
TEST LOT EXPIRATION DATE: NORMAL
TEST LOT NUMBER: NORMAL

## 2025-08-19 ASSESSMENT — PATIENT HEALTH QUESTIONNAIRE - PHQ9
CLINICAL INTERPRETATION OF PHQ2 SCORE: NO FURTHER SCREENING NEEDED
SUM OF ALL RESPONSES TO PHQ9 QUESTIONS 1 AND 2: 0
1. LITTLE INTEREST OR PLEASURE IN DOING THINGS: NOT AT ALL
SUM OF ALL RESPONSES TO PHQ9 QUESTIONS 1 AND 2: 0
2. FEELING DOWN, DEPRESSED OR HOPELESS: NOT AT ALL
SUM OF ALL RESPONSES TO PHQ9 QUESTIONS 1 AND 2: 0

## 2025-08-19 ASSESSMENT — PAIN SCALES - GENERAL: PAINLEVEL_OUTOF10: 2

## 2025-08-20 ENCOUNTER — APPOINTMENT (OUTPATIENT)
Dept: BEHAVIORAL HEALTH | Age: 25
End: 2025-08-20

## 2025-08-20 DIAGNOSIS — F31.9 BIPOLAR 1 DISORDER  (CMD): Primary | ICD-10-CM

## 2025-08-20 LAB — BACTERIA THROAT AEROBE CULT: NORMAL

## 2025-08-20 PROCEDURE — 90791 PSYCH DIAGNOSTIC EVALUATION: CPT | Performed by: SOCIAL WORKER

## 2025-08-30 ENCOUNTER — NURSE ONLY (OUTPATIENT)
Dept: INTERNAL MEDICINE | Age: 25
End: 2025-08-30

## 2025-08-30 DIAGNOSIS — F31.32 BIPOLAR 1 DISORDER, DEPRESSED, MODERATE  (CMD): Primary | ICD-10-CM

## 2025-09-05 ENCOUNTER — APPOINTMENT (OUTPATIENT)
Dept: BEHAVIORAL HEALTH | Age: 25
End: 2025-09-05

## 2025-09-10 ENCOUNTER — APPOINTMENT (OUTPATIENT)
Dept: BEHAVIORAL HEALTH | Age: 25
End: 2025-09-10

## 2025-09-11 ENCOUNTER — APPOINTMENT (OUTPATIENT)
Age: 25
End: 2025-09-11

## 2025-09-29 ENCOUNTER — APPOINTMENT (OUTPATIENT)
Dept: INTERNAL MEDICINE | Age: 25
End: 2025-09-29